# Patient Record
Sex: FEMALE | Race: WHITE
[De-identification: names, ages, dates, MRNs, and addresses within clinical notes are randomized per-mention and may not be internally consistent; named-entity substitution may affect disease eponyms.]

---

## 2017-04-04 ENCOUNTER — HOSPITAL ENCOUNTER (OUTPATIENT)
Dept: HOSPITAL 8 - CFH | Age: 52
Discharge: HOME | End: 2017-04-04
Attending: RADIOLOGY
Payer: COMMERCIAL

## 2017-04-04 DIAGNOSIS — C50.112: ICD-10-CM

## 2017-04-04 DIAGNOSIS — C79.31: Primary | ICD-10-CM

## 2017-04-04 DIAGNOSIS — C79.51: ICD-10-CM

## 2017-04-04 PROCEDURE — 70553 MRI BRAIN STEM W/O & W/DYE: CPT

## 2017-04-04 PROCEDURE — A9585 GADOBUTROL INJECTION: HCPCS

## 2017-04-12 ENCOUNTER — HOSPITAL ENCOUNTER (OUTPATIENT)
Dept: HOSPITAL 8 - ROC | Age: 52
Discharge: HOME | End: 2017-04-12
Attending: RADIOLOGY
Payer: COMMERCIAL

## 2017-04-12 DIAGNOSIS — C50.912: ICD-10-CM

## 2017-04-12 DIAGNOSIS — C79.31: Primary | ICD-10-CM

## 2017-04-12 PROCEDURE — G0463 HOSPITAL OUTPT CLINIC VISIT: HCPCS

## 2017-04-12 PROCEDURE — 99213 OFFICE O/P EST LOW 20 MIN: CPT

## 2017-05-16 ENCOUNTER — HOSPITAL ENCOUNTER (OUTPATIENT)
Dept: RADIOLOGY | Facility: MEDICAL CENTER | Age: 52
End: 2017-05-16
Attending: INTERNAL MEDICINE
Payer: COMMERCIAL

## 2017-05-16 DIAGNOSIS — C50.412 MALIGNANT NEOPLASM OF UPPER-OUTER QUADRANT OF LEFT FEMALE BREAST (HCC): ICD-10-CM

## 2017-05-16 PROCEDURE — A9503 TC99M MEDRONATE: HCPCS

## 2017-05-16 PROCEDURE — 700117 HCHG RX CONTRAST REV CODE 255: Performed by: INTERNAL MEDICINE

## 2017-05-16 PROCEDURE — 71260 CT THORAX DX C+: CPT

## 2017-05-16 RX ADMIN — IOHEXOL 100 ML: 350 INJECTION, SOLUTION INTRAVENOUS at 11:22

## 2017-05-23 ENCOUNTER — HOSPITAL ENCOUNTER (OUTPATIENT)
Dept: RADIOLOGY | Facility: MEDICAL CENTER | Age: 52
End: 2017-05-23
Attending: INTERNAL MEDICINE
Payer: COMMERCIAL

## 2017-05-23 ENCOUNTER — HOSPITAL ENCOUNTER (OUTPATIENT)
Dept: RADIOLOGY | Facility: MEDICAL CENTER | Age: 52
End: 2017-05-23

## 2017-05-23 DIAGNOSIS — C50.412 MALIGNANT NEOPLASM OF UPPER-OUTER QUADRANT OF LEFT FEMALE BREAST (HCC): ICD-10-CM

## 2017-05-23 PROCEDURE — 77080 DXA BONE DENSITY AXIAL: CPT

## 2017-06-01 ENCOUNTER — HOSPITAL ENCOUNTER (OUTPATIENT)
Dept: HOSPITAL 8 - CVU | Age: 52
Discharge: HOME | End: 2017-06-01
Attending: INTERNAL MEDICINE
Payer: COMMERCIAL

## 2017-06-01 DIAGNOSIS — E78.5: ICD-10-CM

## 2017-06-01 DIAGNOSIS — I34.0: Primary | ICD-10-CM

## 2017-06-01 DIAGNOSIS — R55: ICD-10-CM

## 2017-06-01 DIAGNOSIS — Z82.49: ICD-10-CM

## 2017-06-01 DIAGNOSIS — Z85.3: ICD-10-CM

## 2017-06-01 PROCEDURE — 93306 TTE W/DOPPLER COMPLETE: CPT

## 2017-06-21 ENCOUNTER — HOSPITAL ENCOUNTER (OUTPATIENT)
Dept: HOSPITAL 8 - ROC | Age: 52
Discharge: HOME | End: 2017-06-21
Attending: RADIOLOGY
Payer: COMMERCIAL

## 2017-06-21 DIAGNOSIS — C79.31: ICD-10-CM

## 2017-06-21 DIAGNOSIS — C50.112: ICD-10-CM

## 2017-06-21 DIAGNOSIS — Z08: Primary | ICD-10-CM

## 2017-06-21 PROCEDURE — G0463 HOSPITAL OUTPT CLINIC VISIT: HCPCS

## 2017-06-21 PROCEDURE — 99213 OFFICE O/P EST LOW 20 MIN: CPT

## 2017-08-02 ENCOUNTER — HOSPITAL ENCOUNTER (OUTPATIENT)
Dept: HOSPITAL 8 - ROC | Age: 52
Discharge: HOME | End: 2017-08-02
Attending: RADIOLOGY
Payer: COMMERCIAL

## 2017-08-02 DIAGNOSIS — Z79.811: ICD-10-CM

## 2017-08-02 DIAGNOSIS — Z92.3: ICD-10-CM

## 2017-08-02 DIAGNOSIS — Z90.12: ICD-10-CM

## 2017-08-02 DIAGNOSIS — C50.912: Primary | ICD-10-CM

## 2017-08-02 DIAGNOSIS — C79.31: ICD-10-CM

## 2017-08-02 PROCEDURE — 99213 OFFICE O/P EST LOW 20 MIN: CPT

## 2017-08-02 PROCEDURE — G0463 HOSPITAL OUTPT CLINIC VISIT: HCPCS

## 2017-08-21 ENCOUNTER — HOSPITAL ENCOUNTER (OUTPATIENT)
Dept: HOSPITAL 8 - CFH | Age: 52
Discharge: HOME | End: 2017-08-21
Attending: SURGERY
Payer: COMMERCIAL

## 2017-08-21 DIAGNOSIS — Z92.3: ICD-10-CM

## 2017-08-21 DIAGNOSIS — Z85.3: Primary | ICD-10-CM

## 2017-08-21 DIAGNOSIS — Z90.12: ICD-10-CM

## 2017-08-21 PROCEDURE — G0204 DX MAMMO INCL CAD BI: HCPCS

## 2017-10-02 ENCOUNTER — HOSPITAL ENCOUNTER (OUTPATIENT)
Dept: RADIOLOGY | Facility: MEDICAL CENTER | Age: 52
End: 2017-10-02
Attending: RADIOLOGY
Payer: COMMERCIAL

## 2017-10-02 DIAGNOSIS — C79.31 SECONDARY MALIGNANT NEOPLASM OF BRAIN AND SPINAL CORD (HCC): ICD-10-CM

## 2017-10-02 DIAGNOSIS — C79.49 SECONDARY MALIGNANT NEOPLASM OF BRAIN AND SPINAL CORD (HCC): ICD-10-CM

## 2017-10-02 PROCEDURE — 700117 HCHG RX CONTRAST REV CODE 255: Performed by: RADIOLOGY

## 2017-10-02 PROCEDURE — 70553 MRI BRAIN STEM W/O & W/DYE: CPT

## 2017-10-02 PROCEDURE — A9579 GAD-BASE MR CONTRAST NOS,1ML: HCPCS | Performed by: RADIOLOGY

## 2017-10-02 RX ADMIN — GADODIAMIDE 15 ML: 287 INJECTION INTRAVENOUS at 09:53

## 2017-10-04 ENCOUNTER — HOSPITAL ENCOUNTER (OUTPATIENT)
Dept: HOSPITAL 8 - ROC | Age: 52
Discharge: HOME | End: 2017-10-04
Attending: RADIOLOGY
Payer: COMMERCIAL

## 2017-10-04 DIAGNOSIS — D49.6: ICD-10-CM

## 2017-10-04 DIAGNOSIS — C50.912: Primary | ICD-10-CM

## 2017-10-04 DIAGNOSIS — Z90.12: ICD-10-CM

## 2017-10-04 DIAGNOSIS — Z92.3: ICD-10-CM

## 2017-10-04 PROCEDURE — 99213 OFFICE O/P EST LOW 20 MIN: CPT

## 2017-10-04 PROCEDURE — G0463 HOSPITAL OUTPT CLINIC VISIT: HCPCS

## 2017-11-01 ENCOUNTER — HOSPITAL ENCOUNTER (OUTPATIENT)
Dept: LAB | Facility: MEDICAL CENTER | Age: 52
End: 2017-11-01
Attending: NURSE PRACTITIONER
Payer: COMMERCIAL

## 2017-11-01 LAB — CYTOLOGY REG CYTOL: NORMAL

## 2017-11-01 PROCEDURE — 88112 CYTOPATH CELL ENHANCE TECH: CPT

## 2018-01-02 ENCOUNTER — HOSPITAL ENCOUNTER (OUTPATIENT)
Dept: RADIOLOGY | Facility: MEDICAL CENTER | Age: 53
End: 2018-01-02
Attending: RADIOLOGY
Payer: COMMERCIAL

## 2018-01-02 DIAGNOSIS — C79.31 SECONDARY MALIGNANT NEOPLASM OF BRAIN AND SPINAL CORD (HCC): ICD-10-CM

## 2018-01-02 DIAGNOSIS — C79.49 SECONDARY MALIGNANT NEOPLASM OF BRAIN AND SPINAL CORD (HCC): ICD-10-CM

## 2018-01-02 PROCEDURE — 700117 HCHG RX CONTRAST REV CODE 255: Performed by: RADIOLOGY

## 2018-01-02 PROCEDURE — 70553 MRI BRAIN STEM W/O & W/DYE: CPT

## 2018-01-02 PROCEDURE — A9579 GAD-BASE MR CONTRAST NOS,1ML: HCPCS | Performed by: RADIOLOGY

## 2018-01-02 RX ADMIN — GADODIAMIDE 15 ML: 287 INJECTION INTRAVENOUS at 10:54

## 2018-01-04 ENCOUNTER — HOSPITAL ENCOUNTER (OUTPATIENT)
Dept: HOSPITAL 8 - ROC | Age: 53
Discharge: HOME | End: 2018-01-04
Attending: RADIOLOGY
Payer: COMMERCIAL

## 2018-01-04 DIAGNOSIS — C50.912: ICD-10-CM

## 2018-01-04 DIAGNOSIS — C79.31: Primary | ICD-10-CM

## 2018-01-04 PROCEDURE — G0463 HOSPITAL OUTPT CLINIC VISIT: HCPCS

## 2018-01-04 PROCEDURE — 99213 OFFICE O/P EST LOW 20 MIN: CPT

## 2018-02-15 ENCOUNTER — HOSPITAL ENCOUNTER (OUTPATIENT)
Dept: RADIOLOGY | Facility: MEDICAL CENTER | Age: 53
End: 2018-02-15

## 2018-02-21 ENCOUNTER — TELEPHONE (OUTPATIENT)
Dept: RADIOLOGY | Facility: MEDICAL CENTER | Age: 53
End: 2018-02-21

## 2018-02-22 ENCOUNTER — HOSPITAL ENCOUNTER (OUTPATIENT)
Dept: RADIOLOGY | Facility: MEDICAL CENTER | Age: 53
End: 2018-02-22
Attending: SURGERY
Payer: COMMERCIAL

## 2018-02-22 DIAGNOSIS — Z85.3 PERSONAL HISTORY OF MALIGNANT NEOPLASM OF BREAST: ICD-10-CM

## 2018-02-22 PROCEDURE — G0279 TOMOSYNTHESIS, MAMMO: HCPCS | Mod: LT

## 2018-03-01 ENCOUNTER — HOSPITAL ENCOUNTER (OUTPATIENT)
Dept: LAB | Facility: MEDICAL CENTER | Age: 53
End: 2018-03-01
Attending: INTERNAL MEDICINE
Payer: COMMERCIAL

## 2018-03-01 LAB
25(OH)D3 SERPL-MCNC: 38 NG/ML (ref 30–100)
ALBUMIN SERPL BCP-MCNC: 4.2 G/DL (ref 3.2–4.9)
ALBUMIN/GLOB SERPL: 1.5 G/DL
ALP SERPL-CCNC: 104 U/L (ref 30–99)
ALT SERPL-CCNC: 32 U/L (ref 2–50)
ANION GAP SERPL CALC-SCNC: 7 MMOL/L (ref 0–11.9)
AST SERPL-CCNC: 26 U/L (ref 12–45)
BILIRUB SERPL-MCNC: 0.5 MG/DL (ref 0.1–1.5)
BUN SERPL-MCNC: 14 MG/DL (ref 8–22)
CALCIUM SERPL-MCNC: 9.4 MG/DL (ref 8.5–10.5)
CHLORIDE SERPL-SCNC: 105 MMOL/L (ref 96–112)
CHOLEST SERPL-MCNC: 156 MG/DL (ref 100–199)
CO2 SERPL-SCNC: 27 MMOL/L (ref 20–33)
CREAT SERPL-MCNC: 0.71 MG/DL (ref 0.5–1.4)
ESTRADIOL SERPL-MCNC: 26 PG/ML
FSH SERPL-ACNC: 130.9 MIU/ML
GLOBULIN SER CALC-MCNC: 2.8 G/DL (ref 1.9–3.5)
GLUCOSE SERPL-MCNC: 93 MG/DL (ref 65–99)
HDLC SERPL-MCNC: 48 MG/DL
LDLC SERPL CALC-MCNC: 95 MG/DL
POTASSIUM SERPL-SCNC: 3.8 MMOL/L (ref 3.6–5.5)
PROT SERPL-MCNC: 7 G/DL (ref 6–8.2)
SODIUM SERPL-SCNC: 139 MMOL/L (ref 135–145)
TRIGL SERPL-MCNC: 66 MG/DL (ref 0–149)

## 2018-03-01 PROCEDURE — 36415 COLL VENOUS BLD VENIPUNCTURE: CPT

## 2018-03-01 PROCEDURE — 82306 VITAMIN D 25 HYDROXY: CPT

## 2018-03-01 PROCEDURE — 83001 ASSAY OF GONADOTROPIN (FSH): CPT

## 2018-03-01 PROCEDURE — 82670 ASSAY OF TOTAL ESTRADIOL: CPT

## 2018-03-01 PROCEDURE — 80061 LIPID PANEL: CPT

## 2018-03-01 PROCEDURE — 80053 COMPREHEN METABOLIC PANEL: CPT

## 2018-03-16 ENCOUNTER — HOSPITAL ENCOUNTER (OUTPATIENT)
Dept: RADIOLOGY | Facility: MEDICAL CENTER | Age: 53
End: 2018-03-16
Attending: RADIOLOGY
Payer: COMMERCIAL

## 2018-03-16 DIAGNOSIS — C79.31 SECONDARY MALIGNANT NEOPLASM OF BRAIN AND SPINAL CORD (HCC): ICD-10-CM

## 2018-03-16 DIAGNOSIS — C79.49 SECONDARY MALIGNANT NEOPLASM OF BRAIN AND SPINAL CORD (HCC): ICD-10-CM

## 2018-03-16 PROCEDURE — 700117 HCHG RX CONTRAST REV CODE 255: Performed by: RADIOLOGY

## 2018-03-16 PROCEDURE — A9579 GAD-BASE MR CONTRAST NOS,1ML: HCPCS | Performed by: RADIOLOGY

## 2018-03-16 PROCEDURE — 70553 MRI BRAIN STEM W/O & W/DYE: CPT

## 2018-03-16 RX ADMIN — GADODIAMIDE 15 ML: 287 INJECTION INTRAVENOUS at 16:02

## 2018-03-22 ENCOUNTER — HOSPITAL ENCOUNTER (OUTPATIENT)
Dept: HOSPITAL 8 - ROC | Age: 53
Discharge: HOME | End: 2018-03-22
Attending: RADIOLOGY
Payer: COMMERCIAL

## 2018-03-22 DIAGNOSIS — Z08: Primary | ICD-10-CM

## 2018-03-22 DIAGNOSIS — Z90.12: ICD-10-CM

## 2018-03-22 DIAGNOSIS — Z85.3: ICD-10-CM

## 2018-03-22 DIAGNOSIS — C79.31: ICD-10-CM

## 2018-03-22 PROCEDURE — G0463 HOSPITAL OUTPT CLINIC VISIT: HCPCS

## 2018-03-22 PROCEDURE — 99213 OFFICE O/P EST LOW 20 MIN: CPT

## 2018-05-14 ENCOUNTER — HOSPITAL ENCOUNTER (OUTPATIENT)
Dept: RADIOLOGY | Facility: MEDICAL CENTER | Age: 53
End: 2018-05-14
Attending: RADIOLOGY
Payer: COMMERCIAL

## 2018-05-14 DIAGNOSIS — C79.49 SECONDARY MALIGNANT NEOPLASM OF BRAIN AND SPINAL CORD (HCC): ICD-10-CM

## 2018-05-14 DIAGNOSIS — C79.31 SECONDARY MALIGNANT NEOPLASM OF BRAIN AND SPINAL CORD (HCC): ICD-10-CM

## 2018-05-14 PROCEDURE — A9579 GAD-BASE MR CONTRAST NOS,1ML: HCPCS | Performed by: RADIOLOGY

## 2018-05-14 PROCEDURE — 700117 HCHG RX CONTRAST REV CODE 255: Performed by: RADIOLOGY

## 2018-05-14 PROCEDURE — 70553 MRI BRAIN STEM W/O & W/DYE: CPT

## 2018-05-14 RX ADMIN — GADODIAMIDE 15 ML: 287 INJECTION INTRAVENOUS at 10:46

## 2018-05-23 ENCOUNTER — HOSPITAL ENCOUNTER (OUTPATIENT)
Dept: HOSPITAL 8 - ROC | Age: 53
Discharge: HOME | End: 2018-05-23
Attending: RADIOLOGY
Payer: COMMERCIAL

## 2018-05-23 DIAGNOSIS — C50.112: ICD-10-CM

## 2018-05-23 DIAGNOSIS — C79.31: Primary | ICD-10-CM

## 2018-05-23 DIAGNOSIS — Z79.811: ICD-10-CM

## 2018-05-23 PROCEDURE — 99213 OFFICE O/P EST LOW 20 MIN: CPT

## 2018-05-23 PROCEDURE — G0463 HOSPITAL OUTPT CLINIC VISIT: HCPCS

## 2018-06-01 ENCOUNTER — HOSPITAL ENCOUNTER (OUTPATIENT)
Dept: RADIOLOGY | Facility: MEDICAL CENTER | Age: 53
End: 2018-06-01
Attending: RADIOLOGY
Payer: COMMERCIAL

## 2018-06-01 DIAGNOSIS — C79.49 SECONDARY MALIGNANT NEOPLASM OF BRAIN AND SPINAL CORD (HCC): ICD-10-CM

## 2018-06-01 DIAGNOSIS — C79.31 SECONDARY MALIGNANT NEOPLASM OF BRAIN AND SPINAL CORD (HCC): ICD-10-CM

## 2018-06-01 PROCEDURE — A9552 F18 FDG: HCPCS

## 2018-06-07 ENCOUNTER — HOSPITAL ENCOUNTER (OUTPATIENT)
Dept: RADIOLOGY | Facility: MEDICAL CENTER | Age: 53
End: 2018-06-07
Attending: RADIOLOGY
Payer: COMMERCIAL

## 2018-06-07 DIAGNOSIS — C50.112 MALIGNANT NEOPLASM OF CENTRAL PORTION OF LEFT FEMALE BREAST, UNSPECIFIED ESTROGEN RECEPTOR STATUS (HCC): ICD-10-CM

## 2018-06-07 DIAGNOSIS — C77.0 METASTASIS TO HEAD AND NECK LYMPH NODE (HCC): ICD-10-CM

## 2018-06-07 PROCEDURE — 88112 CYTOPATH CELL ENHANCE TECH: CPT

## 2018-06-07 PROCEDURE — 10022 IR-NEEDLE BX-LYMPH NODE: CPT

## 2018-06-07 ASSESSMENT — PAIN SCALES - GENERAL: PAINLEVEL_OUTOF10: 0

## 2018-06-07 NOTE — PROGRESS NOTES
"Patient given [facility] \"Preventing the Spread of Infection\" brochure upon arrival.     US guided fine needle aspiration of  LEFT Neck Lymph Node done by Dr. Georges; Left anterior aspect of neck access site; 1 jar of cytolyt obtained and sent to pathology lab; pt tolerated the procedure well; pt hemodynamically stable pre/intra/post procedure; all questions and concerns answered prior to being d/c; patient provided with appropriate education for procedure; pt d/c home.  "

## 2018-06-26 ENCOUNTER — HOSPITAL ENCOUNTER (OUTPATIENT)
Facility: MEDICAL CENTER | Age: 53
End: 2018-06-26
Attending: SURGERY | Admitting: SURGERY
Payer: COMMERCIAL

## 2018-06-26 VITALS
HEIGHT: 65 IN | DIASTOLIC BLOOD PRESSURE: 77 MMHG | BODY MASS INDEX: 23.65 KG/M2 | OXYGEN SATURATION: 100 % | RESPIRATION RATE: 14 BRPM | TEMPERATURE: 97.3 F | SYSTOLIC BLOOD PRESSURE: 138 MMHG | HEART RATE: 57 BPM | WEIGHT: 141.98 LBS

## 2018-06-26 PROCEDURE — 88305 TISSUE EXAM BY PATHOLOGIST: CPT

## 2018-06-26 PROCEDURE — A6402 STERILE GAUZE <= 16 SQ IN: HCPCS | Performed by: SURGERY

## 2018-06-26 PROCEDURE — 700111 HCHG RX REV CODE 636 W/ 250 OVERRIDE (IP)

## 2018-06-26 PROCEDURE — 88360 TUMOR IMMUNOHISTOCHEM/MANUAL: CPT

## 2018-06-26 PROCEDURE — 501838 HCHG SUTURE GENERAL: Performed by: SURGERY

## 2018-06-26 PROCEDURE — 88342 IMHCHEM/IMCYTCHM 1ST ANTB: CPT

## 2018-06-26 PROCEDURE — 160048 HCHG OR STATISTICAL LEVEL 1-5: Performed by: SURGERY

## 2018-06-26 PROCEDURE — 700101 HCHG RX REV CODE 250

## 2018-06-26 PROCEDURE — 160046 HCHG PACU - 1ST 60 MINS PHASE II: Performed by: SURGERY

## 2018-06-26 PROCEDURE — 160009 HCHG ANES TIME/MIN: Performed by: SURGERY

## 2018-06-26 PROCEDURE — 160035 HCHG PACU - 1ST 60 MINS PHASE I: Performed by: SURGERY

## 2018-06-26 PROCEDURE — 160029 HCHG SURGERY MINUTES - 1ST 30 MINS LEVEL 4: Performed by: SURGERY

## 2018-06-26 PROCEDURE — 160036 HCHG PACU - EA ADDL 30 MINS PHASE I: Performed by: SURGERY

## 2018-06-26 PROCEDURE — 160002 HCHG RECOVERY MINUTES (STAT): Performed by: SURGERY

## 2018-06-26 PROCEDURE — 160025 RECOVERY II MINUTES (STATS): Performed by: SURGERY

## 2018-06-26 PROCEDURE — 88341 IMHCHEM/IMCYTCHM EA ADD ANTB: CPT

## 2018-06-26 RX ORDER — HYDROCODONE BITARTRATE AND ACETAMINOPHEN 5; 325 MG/1; MG/1
1-2 TABLET ORAL EVERY 4 HOURS PRN
Status: DISCONTINUED | OUTPATIENT
Start: 2018-06-26 | End: 2018-06-26 | Stop reason: HOSPADM

## 2018-06-26 RX ORDER — SODIUM CHLORIDE AND POTASSIUM CHLORIDE 150; 900 MG/100ML; MG/100ML
INJECTION, SOLUTION INTRAVENOUS CONTINUOUS
Status: DISCONTINUED | OUTPATIENT
Start: 2018-06-26 | End: 2018-06-26 | Stop reason: HOSPADM

## 2018-06-26 RX ORDER — LETROZOLE 2.5 MG/1
2.5 TABLET, FILM COATED ORAL EVERY EVENING
COMMUNITY
End: 2020-02-04

## 2018-06-26 RX ORDER — LIDOCAINE HYDROCHLORIDE 10 MG/ML
0.5 INJECTION, SOLUTION INFILTRATION; PERINEURAL
Status: DISCONTINUED | OUTPATIENT
Start: 2018-06-26 | End: 2018-06-26 | Stop reason: HOSPADM

## 2018-06-26 RX ORDER — BUPIVACAINE HYDROCHLORIDE 2.5 MG/ML
INJECTION, SOLUTION EPIDURAL; INFILTRATION; INTRACAUDAL
Status: DISCONTINUED | OUTPATIENT
Start: 2018-06-26 | End: 2018-06-26 | Stop reason: HOSPADM

## 2018-06-26 RX ORDER — ATORVASTATIN CALCIUM 80 MG/1
80 TABLET, FILM COATED ORAL DAILY
COMMUNITY
End: 2021-10-06

## 2018-06-26 RX ORDER — SODIUM CHLORIDE, SODIUM LACTATE, POTASSIUM CHLORIDE, CALCIUM CHLORIDE 600; 310; 30; 20 MG/100ML; MG/100ML; MG/100ML; MG/100ML
INJECTION, SOLUTION INTRAVENOUS CONTINUOUS
Status: DISCONTINUED | OUTPATIENT
Start: 2018-06-26 | End: 2018-06-26 | Stop reason: HOSPADM

## 2018-06-26 RX ADMIN — SODIUM CHLORIDE, SODIUM LACTATE, POTASSIUM CHLORIDE, CALCIUM CHLORIDE: 600; 310; 30; 20 INJECTION, SOLUTION INTRAVENOUS at 10:14

## 2018-06-26 ASSESSMENT — PAIN SCALES - GENERAL
PAINLEVEL_OUTOF10: 0
PAINLEVEL_OUTOF10: 3
PAINLEVEL_OUTOF10: 0

## 2018-06-26 NOTE — OP REPORT
DATE OF SERVICE:  06/26/2018    PREOPERATIVE DIAGNOSIS:  Left cervical lymphadenopathy with history of breast   cancer.    POSTOPERATIVE DIAGNOSIS:  Left cervical lymphadenopathy with history of breast   cancer.    PROCEDURE:  Left cervical lymph node biopsy.    SURGEON:  Nataliia Acevedo MD    ANESTHESIA:  General endotracheal anesthesia.    ANESTHESIOLOGIST:  Toro Ragland MD    INDICATIONS:  The patient is a 52-year-old female, who presented in 2016 with   a brain metastasis.  She subsequently had radiation.  She also was found to   have breast cancer in the left breast.  She had a partial mastectomy and   sentinel node biopsy done at that time.  The tumor was found to be ER/MO   positive.  She subsequently has been on letrozole; however, she now has   developed a lymph node in her neck and she is being brought at this time for   excision of that.    FINDINGS:  Her 2 cm lymph node on the left cervical anterior chain in the   submandibular area.    DESCRIPTION OF PROCEDURE:  After patient was identified and consented, she was   brought to the operating room and placed in supine position.  Patient   underwent laryngeal mask anesthetic clearance.  Patient's left neck was   prepped and draped in sterile fashion.  The transverse incision was made over   the mass using electrocautery.  Subcutaneous tissue was dissected down to the   subcutaneous tissue and platysma through lymph node.  It was  from   surrounding tissues using electrocautery as well as Hemoclips and sent to   pathology for evaluation.  The cavity was irrigated and hemostasis secured.    Cavity was closed with 3-0 Vicryl subcutaneous layer and skin was closed with   running 4-0 in a fashion.  Anesthetized with 0.25% Marcaine.  Op-Site dressing   placed on the wound.  Patient was extubated and taken to recovery room in   stable condition.  All sponge and needle counts were correct.       ____________________________________     NATALIIA ACEVEDO,  MD [facility]    DD:  06/26/2018 11:43:17  DT:  06/26/2018 11:53:35    D#:  [id number]  Job#:  [id number]    cc: IVIS SMYTH MD, DELLA BIRD MD

## 2018-06-26 NOTE — OR NURSING
Discharge information reviewed with patient and responsible adult. No questions or concerns at this time.     IV discontinued.     See vital sign flowsheets  for discharge details.

## 2018-06-26 NOTE — DISCHARGE INSTRUCTIONS
ACTIVITY: Rest and take it easy for the first 24 hours.  A responsible adult is recommended to remain with you during that time.  It is normal to feel sleepy.  We encourage you to not do anything that requires balance, judgment or coordination.    MILD FLU-LIKE SYMPTOMS ARE NORMAL. YOU MAY EXPERIENCE GENERALIZED MUSCLE ACHES, THROAT IRRITATION, HEADACHE AND/OR SOME NAUSEA.    FOR 24 HOURS DO NOT:  Drive, operate machinery or run household appliances.  Drink beer or alcoholic beverages.   Make important decisions or sign legal documents.    SPECIAL INSTRUCTIONS:     D/C instructions:    DIET: Upon discharge from the hospital you may resume your normal preoperative diet. Depending on how you are feeling and whether you have nausea or not, you may wish to stay with a bland diet for the first few days. However, you can advance this as quickly as you feel ready.    ACTIVITIES: After discharge from the hospital, you may resume full routine activities. However, there should be no heavy lifting (greater than 15 pounds) and no strenuous activities until after your follow-up visit. Otherwise, routine activities of daily living are acceptable.    DRIVING: If you drive, you may drive whenever you are off pain medications and are able to perform the activities needed to drive, i.e. turning, bending, twisting, etc.    BATHING: You may get the wound wet at any time after leaving the hospital. You may shower, but do not submerge in a bath for at least a week. Dressings may come off after 48 hours.    PAIN MEDICATION: You will be given a prescription for pain medication at discharge. Please take these as directed. It is important to remember not to take medications on an empty stomach as this may cause nausea.    CALL IF YOU HAVE: (1) Fevers to more than 1010 F, (2) Unusual chest or leg pain, (3) Drainage or fluid from incision that may be foul smelling, increased tenderness or soreness at the wound or the wound edges are no  longer together, redness or swelling at the incision site. Please do not hesitate to call with any other questions.     APPOINTMENT: Contact our office at [phone] for a follow-up appointment in 1 week following your procedure.    If you have any additional questions, please do not hesitate to call the office.     Office address:   [address], [address] [place] [address]  Biopsy  Care After  These instructions give you information on caring for yourself after your procedure. Your doctor may also give you more specific instructions. Call your doctor if you have any problems or questions after your procedure.  HOME CARE   · Return to your normal diet and activities as told by your doctor.  · Change your bandages (dressings) as told by your doctor. If skin glue (adhesive) was used, it will peel off in 7 days.  · Only take medicines as told by your doctor.  · Ask your doctor when you can bathe and get your wound wet.  GET HELP RIGHT AWAY IF:  · You see more than a small spot of blood coming from the wound.  · You have redness, puffiness (swelling), or pain.  · You see yellowish-white fluid (pus) coming from the wound.  · You have a fever.  · You notice a bad smell coming from the wound or bandage.  · You have a rash, trouble breathing, or any allergy problems.  MAKE SURE YOU:   · Understand these instructions.  · Will watch your condition.  · Will get help right away if you are not doing well or get worse.        DIET: To avoid nausea, slowly advance diet as tolerated, avoiding spicy or greasy foods for the first day.  Add more substantial food to your diet according to your physician's instructions.  Babies can be fed formula or breast milk as soon as they are hungry.  INCREASE FLUIDS AND FIBER TO AVOID CONSTIPATION.    SURGICAL DRESSING/BATHING: Keep dressing clean and dry    FOLLOW-UP APPOINTMENT:  A follow-up appointment should be arranged with your doctor in 1-2 weeks; call to schedule. Follow Up 6/24/18 at  4:00pm    You should CALL YOUR PHYSICIAN if you develop:  Fever greater than 101 degrees F.  Pain not relieved by medication, or persistent nausea or vomiting.  Excessive bleeding (blood soaking through dressing) or unexpected drainage from the wound.  Extreme redness or swelling around the incision site, drainage of pus or foul smelling drainage.  Inability to urinate or empty your bladder within 8 hours.  Problems with breathing or chest pain.    You should call 911 if you develop problems with breathing or chest pain.  If you are unable to contact your doctor or surgical center, you should go to the nearest emergency room or urgent care center.  Physician's telephone #: [phone]    If any questions arise, call your doctor.  If your doctor is not available, please feel free to call the Surgical Center at [phone].  The Center is open Monday through Friday from 7AM to 7PM.  You can also call the [employer] HOTLINE open 24 hours/day, 7 days/week and speak to a nurse at [phone], or toll free at [phone].    A registered nurse may call you a few days after your surgery to see how you are doing after your procedure.    MEDICATIONS: Resume taking daily medication.  Take prescribed pain medication with food.  If no medication is prescribed, you may take non-aspirin pain medication if needed.  PAIN MEDICATION CAN BE VERY CONSTIPATING.  Take a stool softener or laxative such as senokot, pericolace, or milk of magnesia if needed.    Prescription given for Norco.  Pain medication may be taken any time.    If your physician has prescribed pain medication that includes Acetaminophen (Tylenol), do not take additional Acetaminophen (Tylenol) while taking the prescribed medication.    Depression / Suicide Risk    As you are discharged from this [facility] facility, it is important to learn how to keep safe from harming yourself.    Recognize the warning signs:  · Abrupt changes in personality, positive or  negative- including increase in energy   · Giving away possessions  · Change in eating patterns- significant weight changes-  positive or negative  · Change in sleeping patterns- unable to sleep or sleeping all the time   · Unwillingness or inability to communicate  · Depression  · Unusual sadness, discouragement and loneliness  · Talk of wanting to die  · Neglect of personal appearance   · Rebelliousness- reckless behavior  · Withdrawal from people/activities they love  · Confusion- inability to concentrate     If you or a loved one observes any of these behaviors or has concerns about self-harm, here's what you can do:  · Talk about it- your feelings and reasons for harming yourself  · Remove any means that you might use to hurt yourself (examples: pills, rope, extension cords, firearm)  · Get professional help from the community (Mental Health, Substance Abuse, psychological counseling)  · Do not be alone:Call your Safe Contact- someone whom you trust who will be there for you.  · Call your local CRISIS HOTLINE 741-8023 or 235-292-0138  · Call your local Children's Mobile Crisis Response Team [place] (551) 987-6790 or www.FamilyLeaf  · Call the toll free National Suicide Prevention Hotlines   · National Suicide Prevention Lifeline 361-159-PFTW (3657)  · National Hope Line Network 800-SUICIDE (032-2149)

## 2018-06-26 NOTE — OR NURSING
Patient A+Ox4. States pain 3/10. Declines meds. Dressing to left neck clean and dry.  Plan for patient to discharge home.  Taken to bathroom and able to amb with steady gait.

## 2018-06-26 NOTE — OR NURSING
"Assumed care of patient at approx 1305.  Patient alert and oriented x 4. See flowsheets for VS.  Pain is rated 3/10 \"tolerable.\"    Call light and personal belongings within reach. Gurney in lowest position. Monitor alarms set appropriately.    Dressing is CDI   . See flowsheets for detailed wound documentation.           "

## 2018-07-02 ENCOUNTER — HOSPITAL ENCOUNTER (OUTPATIENT)
Dept: LAB | Facility: MEDICAL CENTER | Age: 53
End: 2018-07-02
Attending: INTERNAL MEDICINE
Payer: COMMERCIAL

## 2018-07-02 PROCEDURE — 82306 VITAMIN D 25 HYDROXY: CPT

## 2018-07-02 PROCEDURE — 86300 IMMUNOASSAY TUMOR CA 15-3: CPT

## 2018-07-02 PROCEDURE — 82670 ASSAY OF TOTAL ESTRADIOL: CPT

## 2018-07-02 PROCEDURE — 83001 ASSAY OF GONADOTROPIN (FSH): CPT

## 2018-07-02 PROCEDURE — 80053 COMPREHEN METABOLIC PANEL: CPT

## 2018-07-03 LAB
25(OH)D3 SERPL-MCNC: 42 NG/ML (ref 30–100)
ALBUMIN SERPL BCP-MCNC: 4.4 G/DL (ref 3.2–4.9)
ALBUMIN/GLOB SERPL: 1.6 G/DL
ALP SERPL-CCNC: 114 U/L (ref 30–99)
ALT SERPL-CCNC: 20 U/L (ref 2–50)
ANION GAP SERPL CALC-SCNC: 8 MMOL/L (ref 0–11.9)
AST SERPL-CCNC: 22 U/L (ref 12–45)
BILIRUB SERPL-MCNC: 0.5 MG/DL (ref 0.1–1.5)
BUN SERPL-MCNC: 19 MG/DL (ref 8–22)
CALCIUM SERPL-MCNC: 9.7 MG/DL (ref 8.5–10.5)
CHLORIDE SERPL-SCNC: 104 MMOL/L (ref 96–112)
CO2 SERPL-SCNC: 27 MMOL/L (ref 20–33)
CREAT SERPL-MCNC: 0.76 MG/DL (ref 0.5–1.4)
ESTRADIOL SERPL-MCNC: <20 PG/ML
FSH SERPL-ACNC: 143 MIU/ML
GLOBULIN SER CALC-MCNC: 2.8 G/DL (ref 1.9–3.5)
GLUCOSE SERPL-MCNC: 76 MG/DL (ref 65–99)
POTASSIUM SERPL-SCNC: 4.1 MMOL/L (ref 3.6–5.5)
PROT SERPL-MCNC: 7.2 G/DL (ref 6–8.2)
SODIUM SERPL-SCNC: 139 MMOL/L (ref 135–145)

## 2018-07-04 LAB — CANCER AG27-29 SERPL-ACNC: 21.1 U/ML (ref 0–40)

## 2018-07-09 ENCOUNTER — HOSPITAL ENCOUNTER (OUTPATIENT)
Dept: RADIOLOGY | Facility: MEDICAL CENTER | Age: 53
End: 2018-07-09
Attending: INTERNAL MEDICINE
Payer: COMMERCIAL

## 2018-07-09 DIAGNOSIS — C50.412 MALIGNANT NEOPLASM OF UPPER-OUTER QUADRANT OF LEFT FEMALE BREAST, UNSPECIFIED ESTROGEN RECEPTOR STATUS (HCC): ICD-10-CM

## 2018-07-09 PROCEDURE — 73552 X-RAY EXAM OF FEMUR 2/>: CPT | Mod: LT

## 2018-07-16 ENCOUNTER — HOSPITAL ENCOUNTER (OUTPATIENT)
Dept: RADIOLOGY | Facility: MEDICAL CENTER | Age: 53
End: 2018-07-16
Attending: RADIOLOGY
Payer: COMMERCIAL

## 2018-07-16 DIAGNOSIS — C79.49 SECONDARY MALIGNANT NEOPLASM OF BRAIN AND SPINAL CORD (HCC): ICD-10-CM

## 2018-07-16 DIAGNOSIS — C79.31 SECONDARY MALIGNANT NEOPLASM OF BRAIN AND SPINAL CORD (HCC): ICD-10-CM

## 2018-07-16 PROCEDURE — 70553 MRI BRAIN STEM W/O & W/DYE: CPT

## 2018-07-16 PROCEDURE — 700117 HCHG RX CONTRAST REV CODE 255: Performed by: RADIOLOGY

## 2018-07-16 PROCEDURE — A9585 GADOBUTROL INJECTION: HCPCS | Performed by: RADIOLOGY

## 2018-07-16 RX ORDER — GADOBUTROL 604.72 MG/ML
7.5 INJECTION INTRAVENOUS ONCE
Status: COMPLETED | OUTPATIENT
Start: 2018-07-16 | End: 2018-07-16

## 2018-07-16 RX ADMIN — GADOBUTROL 7.5 ML: 604.72 INJECTION INTRAVENOUS at 13:31

## 2018-07-24 ENCOUNTER — TELEPHONE (OUTPATIENT)
Dept: RADIOLOGY | Facility: MEDICAL CENTER | Age: 53
End: 2018-07-24

## 2018-07-26 ENCOUNTER — HOSPITAL ENCOUNTER (OUTPATIENT)
Dept: LAB | Facility: MEDICAL CENTER | Age: 53
End: 2018-07-26
Attending: INTERNAL MEDICINE
Payer: COMMERCIAL

## 2018-07-26 LAB
ALBUMIN SERPL BCP-MCNC: 4.4 G/DL (ref 3.2–4.9)
ALBUMIN/GLOB SERPL: 2 G/DL
ALP SERPL-CCNC: 116 U/L (ref 30–99)
ALT SERPL-CCNC: 19 U/L (ref 2–50)
ANION GAP SERPL CALC-SCNC: 7 MMOL/L (ref 0–11.9)
AST SERPL-CCNC: 17 U/L (ref 12–45)
BILIRUB SERPL-MCNC: 0.4 MG/DL (ref 0.1–1.5)
BUN SERPL-MCNC: 18 MG/DL (ref 8–22)
CALCIUM SERPL-MCNC: 8.9 MG/DL (ref 8.5–10.5)
CHLORIDE SERPL-SCNC: 108 MMOL/L (ref 96–112)
CO2 SERPL-SCNC: 26 MMOL/L (ref 20–33)
CREAT SERPL-MCNC: 0.97 MG/DL (ref 0.5–1.4)
GLOBULIN SER CALC-MCNC: 2.2 G/DL (ref 1.9–3.5)
GLUCOSE SERPL-MCNC: 49 MG/DL (ref 65–99)
POTASSIUM SERPL-SCNC: 4 MMOL/L (ref 3.6–5.5)
PROT SERPL-MCNC: 6.6 G/DL (ref 6–8.2)
SODIUM SERPL-SCNC: 141 MMOL/L (ref 135–145)

## 2018-07-26 PROCEDURE — 80053 COMPREHEN METABOLIC PANEL: CPT

## 2018-07-31 ENCOUNTER — HOSPITAL ENCOUNTER (OUTPATIENT)
Dept: RADIOLOGY | Facility: MEDICAL CENTER | Age: 53
End: 2018-07-31
Attending: SURGERY
Payer: COMMERCIAL

## 2018-07-31 DIAGNOSIS — Z85.3 PERSONAL HISTORY OF MALIGNANT NEOPLASM OF BREAST: ICD-10-CM

## 2018-07-31 PROCEDURE — G0279 TOMOSYNTHESIS, MAMMO: HCPCS

## 2018-08-01 ENCOUNTER — HOSPITAL ENCOUNTER (OUTPATIENT)
Facility: MEDICAL CENTER | Age: 53
End: 2018-08-01
Attending: DERMATOLOGY
Payer: COMMERCIAL

## 2018-08-01 ENCOUNTER — OFFICE VISIT (OUTPATIENT)
Dept: DERMATOLOGY | Facility: IMAGING CENTER | Age: 53
End: 2018-08-01
Payer: COMMERCIAL

## 2018-08-01 VITALS — BODY MASS INDEX: 23.32 KG/M2 | WEIGHT: 140 LBS | HEIGHT: 65 IN | TEMPERATURE: 98.8 F

## 2018-08-01 DIAGNOSIS — L81.4 LENTIGINES: ICD-10-CM

## 2018-08-01 DIAGNOSIS — D48.5 NEOPLASM OF UNCERTAIN BEHAVIOR OF SKIN: ICD-10-CM

## 2018-08-01 DIAGNOSIS — D22.9 MULTIPLE NEVI: ICD-10-CM

## 2018-08-01 DIAGNOSIS — L82.1 SEBORRHEIC KERATOSES: ICD-10-CM

## 2018-08-01 PROCEDURE — 11100 PR BIOPSY OF SKIN LESION: CPT | Performed by: DERMATOLOGY

## 2018-08-01 PROCEDURE — 99202 OFFICE O/P NEW SF 15 MIN: CPT | Mod: 25 | Performed by: DERMATOLOGY

## 2018-08-01 PROCEDURE — 88304 TISSUE EXAM BY PATHOLOGIST: CPT

## 2018-08-01 RX ORDER — ABEMACICLIB 100 MG/1
100 TABLET ORAL DAILY
COMMUNITY
Start: 2018-07-11 | End: 2021-10-06

## 2018-08-01 ASSESSMENT — ENCOUNTER SYMPTOMS
FEVER: 0
CHILLS: 0

## 2018-08-01 NOTE — PROGRESS NOTES
Dermatology New Patient Visit    Chief Complaint   Patient presents with   • Establish Care     COLETTE       Subjective:     HPI:   Michelle Diana is a 52 y.o. female presenting for    Full skin check  No specific spots of concern, but notes she has increasing number of freckles/brown spots, and she cannot monitor the back very well  Notes she has a few newer brown spots on the nose as well  Previous dermatologist was in [place].     History of skin cancer: No  History of precancers/actinic keratoses: Yes, Details: cryo, shave bx  History of biopsies:Yes, Details: was told Dx precancers  History of blistering/severe sunburns:Yes, Details: as achild  Family history of skin cancer:No  Family history of atypical moles:No  Has metastatic breast CA, on systemic treatment (PO)          Past Medical History:   Diagnosis Date   • Cancer (HCC)     left breast   • High cholesterol        Current Outpatient Prescriptions on File Prior to Visit   Medication Sig Dispense Refill   • atorvastatin (LIPITOR) 80 MG tablet Take 80 mg by mouth every day.     • CALCIUM PO Take 2,000 mg by mouth every day.     • letrozole (FEMARA) 2.5 MG Tab Take 2.5 mg by mouth every evening.     • aspirin EC (ECOTRIN) 81 MG Tablet Delayed Response Take 81 mg by mouth every day.       No current facility-administered medications on file prior to visit.        No Known Allergies    No family history on file.    Social History     Social History   • Marital status:      Spouse name: N/A   • Number of children: N/A   • Years of education: N/A     Occupational History   • Not on file.     Social History Main Topics   • Smoking status: Never Smoker   • Smokeless tobacco: Never Used   • Alcohol use No   • Drug use: No   • Sexual activity: Not on file     Other Topics Concern   • Not on file     Social History Narrative   • No narrative on file       Review of Systems   Constitutional: Negative for chills and fever.   Skin: Negative for itching  "and rash.   All other systems reviewed and are negative.       Objective:     A full mucocutaneous exam was completed including: scalp, hair, ears, face, eyelids, conjunctiva, lips, gums/tongue/oropharynx, neck, chest breasts, abdomen, back, left and right upper extremities (including hands/digits and fingernails), left and right lower extremities (including feet/toes, toenails), buttocks, including external genitalia with the following pertinent findings listed below. Remaining above-listed examined areas within normal limits / negative for rashes or lesions.    Temperature 37.1 °C (98.8 °F), height 1.651 m (5' 5\"), weight 63.5 kg (140 lb).    Physical Exam   Constitutional: She is oriented to person, place, and time and well-developed, well-nourished, and in no distress.   HENT:   Head: Normocephalic and atraumatic.       Right Ear: External ear normal.   Left Ear: External ear normal.   Nose: Nose normal.   Mouth/Throat: Oropharynx is clear and moist.   Eyes: Conjunctivae and lids are normal.   Neck: Normal range of motion. Neck supple.   Cardiovascular: Intact distal pulses.    Pulmonary/Chest: Effort normal.   Neurological: She is alert and oriented to person, place, and time.   Skin: Skin is warm and dry.        Psychiatric: Mood and affect normal.   Vitals reviewed.      DATA: none applicable to review    Assessment and Plan:     1. Neoplasm of uncertain behavior of skin  Procedure Note   Procedure: Biopsy by shave technique  Location: as noted above  Size: as noted in exam  Preoperative diagnosis:scar, r/o atypical pigmented lesion  Risks, benefits and alternatives of procedure discussed and written informed consent obtained. Time out completed. Area of biopsy prepped with alcohol. Anesthesia with 1% lidocaine with epinephrine administered with 30 gauge needle. Shave biopsy of the site performed. Hemostasis achieved with pressure and aluminum chloride. Vaseline applied to wound with bandage. Patient " tolerated procedure well and there were no complications. The specimen was sent to the pathology lab by the staff. Wound care was discussed.  - PATHOLOGY SPECIMEN; Future    2. Multiple nevi  - Benign-appearing nature of lesions discussed. Advised to return to clinic for any new or concerning changes.  - ABCDE's of melanoma discussed    3. Lentigines  - Benign-appearing nature of lesions discussed. Advised to return to clinic for any new or concerning changes.    4. Seborrheic keratoses  - Benign-appearing nature of lesions discussed. Advised to return to clinic for any new or concerning changes.      Followup: Return in about 1 year (around 8/1/2019) for chasity.    Christin Wesley M.D.

## 2018-08-30 ENCOUNTER — HOSPITAL ENCOUNTER (OUTPATIENT)
Dept: LAB | Facility: MEDICAL CENTER | Age: 53
End: 2018-08-30
Attending: INTERNAL MEDICINE
Payer: COMMERCIAL

## 2018-08-30 LAB
ALBUMIN SERPL BCP-MCNC: 4.2 G/DL (ref 3.2–4.9)
ALBUMIN/GLOB SERPL: 1.4 G/DL
ALP SERPL-CCNC: 81 U/L (ref 30–99)
ALT SERPL-CCNC: 24 U/L (ref 2–50)
ANION GAP SERPL CALC-SCNC: 5 MMOL/L (ref 0–11.9)
AST SERPL-CCNC: 19 U/L (ref 12–45)
BILIRUB SERPL-MCNC: 0.4 MG/DL (ref 0.1–1.5)
BUN SERPL-MCNC: 17 MG/DL (ref 8–22)
CALCIUM SERPL-MCNC: 8.9 MG/DL (ref 8.5–10.5)
CHLORIDE SERPL-SCNC: 106 MMOL/L (ref 96–112)
CHOLEST SERPL-MCNC: 152 MG/DL (ref 100–199)
CO2 SERPL-SCNC: 27 MMOL/L (ref 20–33)
CREAT SERPL-MCNC: 0.79 MG/DL (ref 0.5–1.4)
GLOBULIN SER CALC-MCNC: 3 G/DL (ref 1.9–3.5)
GLUCOSE SERPL-MCNC: 82 MG/DL (ref 65–99)
HDLC SERPL-MCNC: 59 MG/DL
LDLC SERPL CALC-MCNC: 78 MG/DL
POTASSIUM SERPL-SCNC: 4.3 MMOL/L (ref 3.6–5.5)
PROT SERPL-MCNC: 7.2 G/DL (ref 6–8.2)
SODIUM SERPL-SCNC: 138 MMOL/L (ref 135–145)
TRIGL SERPL-MCNC: 74 MG/DL (ref 0–149)

## 2018-08-30 PROCEDURE — 80061 LIPID PANEL: CPT

## 2018-08-30 PROCEDURE — 36415 COLL VENOUS BLD VENIPUNCTURE: CPT

## 2018-08-30 PROCEDURE — 80053 COMPREHEN METABOLIC PANEL: CPT

## 2018-09-05 ENCOUNTER — HOSPITAL ENCOUNTER (OUTPATIENT)
Dept: LAB | Facility: MEDICAL CENTER | Age: 53
End: 2018-09-05
Attending: INTERNAL MEDICINE
Payer: COMMERCIAL

## 2018-09-05 LAB
ANION GAP SERPL CALC-SCNC: 8 MMOL/L (ref 0–11.9)
BUN SERPL-MCNC: 20 MG/DL (ref 8–22)
CALCIUM SERPL-MCNC: 9.5 MG/DL (ref 8.5–10.5)
CHLORIDE SERPL-SCNC: 107 MMOL/L (ref 96–112)
CO2 SERPL-SCNC: 24 MMOL/L (ref 20–33)
CREAT SERPL-MCNC: 0.82 MG/DL (ref 0.5–1.4)
GLUCOSE SERPL-MCNC: 87 MG/DL (ref 65–99)
MAGNESIUM SERPL-MCNC: 2.1 MG/DL (ref 1.5–2.5)
PHOSPHATE SERPL-MCNC: 2.4 MG/DL (ref 2.5–4.5)
POTASSIUM SERPL-SCNC: 4 MMOL/L (ref 3.6–5.5)
SODIUM SERPL-SCNC: 139 MMOL/L (ref 135–145)

## 2018-09-05 PROCEDURE — 84100 ASSAY OF PHOSPHORUS: CPT

## 2018-09-05 PROCEDURE — 80048 BASIC METABOLIC PNL TOTAL CA: CPT

## 2018-09-05 PROCEDURE — 86300 IMMUNOASSAY TUMOR CA 15-3: CPT

## 2018-09-05 PROCEDURE — 83735 ASSAY OF MAGNESIUM: CPT

## 2018-09-07 LAB — CANCER AG27-29 SERPL-ACNC: 18.8 U/ML (ref 0–40)

## 2018-09-17 ENCOUNTER — HOSPITAL ENCOUNTER (OUTPATIENT)
Dept: RADIOLOGY | Facility: MEDICAL CENTER | Age: 53
End: 2018-09-17
Attending: RADIOLOGY
Payer: COMMERCIAL

## 2018-09-17 DIAGNOSIS — C79.31 SECONDARY MALIGNANT NEOPLASM OF BRAIN AND SPINAL CORD (HCC): ICD-10-CM

## 2018-09-17 DIAGNOSIS — C79.49 SECONDARY MALIGNANT NEOPLASM OF BRAIN AND SPINAL CORD (HCC): ICD-10-CM

## 2018-09-17 PROCEDURE — 70553 MRI BRAIN STEM W/O & W/DYE: CPT

## 2018-09-17 PROCEDURE — 700117 HCHG RX CONTRAST REV CODE 255: Performed by: FAMILY MEDICINE

## 2018-09-17 PROCEDURE — A9585 GADOBUTROL INJECTION: HCPCS | Performed by: FAMILY MEDICINE

## 2018-09-17 RX ORDER — GADOBUTROL 604.72 MG/ML
7 INJECTION INTRAVENOUS ONCE
Status: COMPLETED | OUTPATIENT
Start: 2018-09-17 | End: 2018-09-17

## 2018-09-17 RX ADMIN — GADOBUTROL 7 ML: 604.72 INJECTION INTRAVENOUS at 10:30

## 2018-09-27 ENCOUNTER — HOSPITAL ENCOUNTER (OUTPATIENT)
Dept: LAB | Facility: MEDICAL CENTER | Age: 53
End: 2018-09-27
Attending: INTERNAL MEDICINE
Payer: COMMERCIAL

## 2018-09-27 LAB
BASOPHILS # BLD AUTO: 1.1 % (ref 0–1.8)
BASOPHILS # BLD: 0.05 K/UL (ref 0–0.12)
EOSINOPHIL # BLD AUTO: 0.22 K/UL (ref 0–0.51)
EOSINOPHIL NFR BLD: 5.2 % (ref 0–6.9)
ERYTHROCYTE [DISTWIDTH] IN BLOOD BY AUTOMATED COUNT: 56.2 FL (ref 35.9–50)
HCT VFR BLD AUTO: 32.1 % (ref 37–47)
HGB BLD-MCNC: 10.9 G/DL (ref 12–16)
LYMPHOCYTES # BLD AUTO: 1.88 K/UL (ref 1–4.8)
LYMPHOCYTES NFR BLD: 44.8 % (ref 22–41)
MANUAL DIFF BLD: NORMAL
MCH RBC QN AUTO: 32.2 PG (ref 27–33)
MCHC RBC AUTO-ENTMCNC: 34 G/DL (ref 33.6–35)
MCV RBC AUTO: 95 FL (ref 81.4–97.8)
MONOCYTES # BLD AUTO: 0.04 K/UL (ref 0–0.85)
MONOCYTES NFR BLD AUTO: 1 % (ref 0–13.4)
MORPHOLOGY BLD-IMP: NORMAL
NEUTROPHILS # BLD AUTO: 2.01 K/UL (ref 2–7.15)
NEUTROPHILS NFR BLD: 47.9 % (ref 44–72)
NRBC # BLD AUTO: 0 K/UL
NRBC BLD-RTO: 0 /100 WBC
PLATELET # BLD AUTO: 220 K/UL (ref 164–446)
PLATELET BLD QL SMEAR: NORMAL
PMV BLD AUTO: 9.5 FL (ref 9–12.9)
RBC # BLD AUTO: 3.38 M/UL (ref 4.2–5.4)
RBC BLD AUTO: NORMAL
WBC # BLD AUTO: 4.2 K/UL (ref 4.8–10.8)

## 2018-09-27 PROCEDURE — 85007 BL SMEAR W/DIFF WBC COUNT: CPT

## 2018-09-27 PROCEDURE — 36415 COLL VENOUS BLD VENIPUNCTURE: CPT

## 2018-09-27 PROCEDURE — 85027 COMPLETE CBC AUTOMATED: CPT

## 2018-09-27 PROCEDURE — 80053 COMPREHEN METABOLIC PANEL: CPT

## 2018-09-28 LAB
ALBUMIN SERPL BCP-MCNC: 3.6 G/DL (ref 3.2–4.9)
ALBUMIN/GLOB SERPL: 1.1 G/DL
ALP SERPL-CCNC: 50 U/L (ref 30–99)
ALT SERPL-CCNC: 15 U/L (ref 2–50)
ANION GAP SERPL CALC-SCNC: 6 MMOL/L (ref 0–11.9)
AST SERPL-CCNC: 15 U/L (ref 12–45)
BILIRUB SERPL-MCNC: 0.4 MG/DL (ref 0.1–1.5)
BUN SERPL-MCNC: 18 MG/DL (ref 8–22)
CALCIUM SERPL-MCNC: 9.1 MG/DL (ref 8.5–10.5)
CHLORIDE SERPL-SCNC: 106 MMOL/L (ref 96–112)
CO2 SERPL-SCNC: 26 MMOL/L (ref 20–33)
CREAT SERPL-MCNC: 0.92 MG/DL (ref 0.5–1.4)
FASTING STATUS PATIENT QL REPORTED: NORMAL
GLOBULIN SER CALC-MCNC: 3.4 G/DL (ref 1.9–3.5)
GLUCOSE SERPL-MCNC: 127 MG/DL (ref 65–99)
POTASSIUM SERPL-SCNC: 3.5 MMOL/L (ref 3.6–5.5)
PROT SERPL-MCNC: 7 G/DL (ref 6–8.2)
SODIUM SERPL-SCNC: 138 MMOL/L (ref 135–145)

## 2018-10-31 ENCOUNTER — HOSPITAL ENCOUNTER (OUTPATIENT)
Dept: LAB | Facility: MEDICAL CENTER | Age: 53
End: 2018-10-31
Attending: INTERNAL MEDICINE
Payer: COMMERCIAL

## 2018-10-31 ENCOUNTER — HOSPITAL ENCOUNTER (OUTPATIENT)
Dept: RADIOLOGY | Facility: MEDICAL CENTER | Age: 53
End: 2018-10-31
Attending: INTERNAL MEDICINE
Payer: COMMERCIAL

## 2018-10-31 DIAGNOSIS — C50.412 MALIGNANT NEOPLASM OF UPPER-OUTER QUADRANT OF LEFT FEMALE BREAST, UNSPECIFIED ESTROGEN RECEPTOR STATUS (HCC): ICD-10-CM

## 2018-10-31 LAB
ALBUMIN SERPL BCP-MCNC: 4.3 G/DL (ref 3.2–4.9)
ALBUMIN/GLOB SERPL: 1.7 G/DL
ALP SERPL-CCNC: 50 U/L (ref 30–99)
ALT SERPL-CCNC: 21 U/L (ref 2–50)
ANION GAP SERPL CALC-SCNC: 6 MMOL/L (ref 0–11.9)
AST SERPL-CCNC: 17 U/L (ref 12–45)
BILIRUB SERPL-MCNC: 0.5 MG/DL (ref 0.1–1.5)
BUN SERPL-MCNC: 26 MG/DL (ref 8–22)
CALCIUM SERPL-MCNC: 9.2 MG/DL (ref 8.5–10.5)
CHLORIDE SERPL-SCNC: 107 MMOL/L (ref 96–112)
CO2 SERPL-SCNC: 26 MMOL/L (ref 20–33)
CREAT SERPL-MCNC: 0.9 MG/DL (ref 0.5–1.4)
GLOBULIN SER CALC-MCNC: 2.6 G/DL (ref 1.9–3.5)
GLUCOSE SERPL-MCNC: 87 MG/DL (ref 65–99)
MAGNESIUM SERPL-MCNC: 2.3 MG/DL (ref 1.5–2.5)
PHOSPHATE SERPL-MCNC: 2.9 MG/DL (ref 2.5–4.5)
POTASSIUM SERPL-SCNC: 3.9 MMOL/L (ref 3.6–5.5)
PROT SERPL-MCNC: 6.9 G/DL (ref 6–8.2)
SODIUM SERPL-SCNC: 139 MMOL/L (ref 135–145)

## 2018-10-31 PROCEDURE — 83735 ASSAY OF MAGNESIUM: CPT

## 2018-10-31 PROCEDURE — A9552 F18 FDG: HCPCS

## 2018-10-31 PROCEDURE — 80053 COMPREHEN METABOLIC PANEL: CPT

## 2018-10-31 PROCEDURE — 84100 ASSAY OF PHOSPHORUS: CPT

## 2018-10-31 PROCEDURE — 86300 IMMUNOASSAY TUMOR CA 15-3: CPT

## 2018-11-02 LAB — CANCER AG27-29 SERPL-ACNC: 20.3 U/ML (ref 0–40)

## 2018-11-19 ENCOUNTER — HOSPITAL ENCOUNTER (OUTPATIENT)
Dept: RADIOLOGY | Facility: MEDICAL CENTER | Age: 53
End: 2018-11-19
Attending: RADIOLOGY
Payer: COMMERCIAL

## 2018-11-19 DIAGNOSIS — C79.31 SECONDARY MALIGNANT NEOPLASM OF BRAIN AND SPINAL CORD (HCC): ICD-10-CM

## 2018-11-19 DIAGNOSIS — C79.49 SECONDARY MALIGNANT NEOPLASM OF BRAIN AND SPINAL CORD (HCC): ICD-10-CM

## 2018-11-19 PROCEDURE — A9585 GADOBUTROL INJECTION: HCPCS | Performed by: RADIOLOGY

## 2018-11-19 PROCEDURE — 700117 HCHG RX CONTRAST REV CODE 255: Performed by: RADIOLOGY

## 2018-11-19 PROCEDURE — 70553 MRI BRAIN STEM W/O & W/DYE: CPT

## 2018-11-19 RX ORDER — GADOBUTROL 604.72 MG/ML
6 INJECTION INTRAVENOUS ONCE
Status: COMPLETED | OUTPATIENT
Start: 2018-11-19 | End: 2018-11-19

## 2018-11-19 RX ADMIN — GADOBUTROL 6 ML: 604.72 INJECTION INTRAVENOUS at 10:25

## 2018-11-21 ENCOUNTER — HOSPITAL ENCOUNTER (OUTPATIENT)
Dept: HOSPITAL 8 - ROC | Age: 53
Discharge: HOME | End: 2018-11-21
Attending: RADIOLOGY
Payer: COMMERCIAL

## 2018-11-21 DIAGNOSIS — C79.31: ICD-10-CM

## 2018-11-21 DIAGNOSIS — Z17.0: ICD-10-CM

## 2018-11-21 DIAGNOSIS — Z08: Primary | ICD-10-CM

## 2018-11-21 DIAGNOSIS — C50.912: ICD-10-CM

## 2018-11-21 PROCEDURE — 99213 OFFICE O/P EST LOW 20 MIN: CPT

## 2018-11-21 PROCEDURE — G0463 HOSPITAL OUTPT CLINIC VISIT: HCPCS

## 2018-11-28 ENCOUNTER — HOSPITAL ENCOUNTER (OUTPATIENT)
Dept: LAB | Facility: MEDICAL CENTER | Age: 53
End: 2018-11-28
Attending: INTERNAL MEDICINE
Payer: COMMERCIAL

## 2018-11-28 PROCEDURE — 80053 COMPREHEN METABOLIC PANEL: CPT

## 2018-11-28 PROCEDURE — 84100 ASSAY OF PHOSPHORUS: CPT

## 2018-11-29 LAB
ALBUMIN SERPL BCP-MCNC: 4.1 G/DL (ref 3.2–4.9)
ALBUMIN/GLOB SERPL: 1.5 G/DL
ALP SERPL-CCNC: 51 U/L (ref 30–99)
ALT SERPL-CCNC: 17 U/L (ref 2–50)
ANION GAP SERPL CALC-SCNC: 8 MMOL/L (ref 0–11.9)
AST SERPL-CCNC: 16 U/L (ref 12–45)
BILIRUB SERPL-MCNC: 0.3 MG/DL (ref 0.1–1.5)
BUN SERPL-MCNC: 18 MG/DL (ref 8–22)
CALCIUM SERPL-MCNC: 9.2 MG/DL (ref 8.5–10.5)
CHLORIDE SERPL-SCNC: 105 MMOL/L (ref 96–112)
CO2 SERPL-SCNC: 26 MMOL/L (ref 20–33)
CREAT SERPL-MCNC: 0.9 MG/DL (ref 0.5–1.4)
GLOBULIN SER CALC-MCNC: 2.8 G/DL (ref 1.9–3.5)
GLUCOSE SERPL-MCNC: 115 MG/DL (ref 65–99)
PHOSPHATE SERPL-MCNC: 3.1 MG/DL (ref 2.5–4.5)
POTASSIUM SERPL-SCNC: 3.9 MMOL/L (ref 3.6–5.5)
PROT SERPL-MCNC: 6.9 G/DL (ref 6–8.2)
SODIUM SERPL-SCNC: 139 MMOL/L (ref 135–145)

## 2018-12-26 ENCOUNTER — HOSPITAL ENCOUNTER (OUTPATIENT)
Dept: LAB | Facility: MEDICAL CENTER | Age: 53
End: 2018-12-26
Attending: INTERNAL MEDICINE
Payer: COMMERCIAL

## 2018-12-26 LAB
ALBUMIN SERPL BCP-MCNC: 4 G/DL (ref 3.2–4.9)
ALBUMIN/GLOB SERPL: 1.4 G/DL
ALP SERPL-CCNC: 58 U/L (ref 30–99)
ALT SERPL-CCNC: 40 U/L (ref 2–50)
ANION GAP SERPL CALC-SCNC: 6 MMOL/L (ref 0–11.9)
AST SERPL-CCNC: 27 U/L (ref 12–45)
BILIRUB SERPL-MCNC: 0.4 MG/DL (ref 0.1–1.5)
BUN SERPL-MCNC: 15 MG/DL (ref 8–22)
CALCIUM SERPL-MCNC: 9.4 MG/DL (ref 8.5–10.5)
CHLORIDE SERPL-SCNC: 106 MMOL/L (ref 96–112)
CO2 SERPL-SCNC: 27 MMOL/L (ref 20–33)
CREAT SERPL-MCNC: 0.77 MG/DL (ref 0.5–1.4)
GLOBULIN SER CALC-MCNC: 2.8 G/DL (ref 1.9–3.5)
GLUCOSE SERPL-MCNC: 92 MG/DL (ref 65–99)
MAGNESIUM SERPL-MCNC: 2.1 MG/DL (ref 1.5–2.5)
PHOSPHATE SERPL-MCNC: 2.4 MG/DL (ref 2.5–4.5)
POTASSIUM SERPL-SCNC: 4.3 MMOL/L (ref 3.6–5.5)
PROT SERPL-MCNC: 6.8 G/DL (ref 6–8.2)
SODIUM SERPL-SCNC: 139 MMOL/L (ref 135–145)

## 2018-12-26 PROCEDURE — 83735 ASSAY OF MAGNESIUM: CPT

## 2018-12-26 PROCEDURE — 84100 ASSAY OF PHOSPHORUS: CPT

## 2018-12-26 PROCEDURE — 80053 COMPREHEN METABOLIC PANEL: CPT

## 2019-01-21 ENCOUNTER — HOSPITAL ENCOUNTER (OUTPATIENT)
Dept: RADIOLOGY | Facility: MEDICAL CENTER | Age: 54
End: 2019-01-21
Attending: RADIOLOGY
Payer: COMMERCIAL

## 2019-01-21 DIAGNOSIS — C79.31 SECONDARY MALIGNANT NEOPLASM OF BRAIN AND SPINAL CORD (HCC): ICD-10-CM

## 2019-01-21 DIAGNOSIS — C79.49 SECONDARY MALIGNANT NEOPLASM OF BRAIN AND SPINAL CORD (HCC): ICD-10-CM

## 2019-01-21 PROCEDURE — A9585 GADOBUTROL INJECTION: HCPCS | Performed by: RADIOLOGY

## 2019-01-21 PROCEDURE — 70553 MRI BRAIN STEM W/O & W/DYE: CPT

## 2019-01-21 PROCEDURE — 700117 HCHG RX CONTRAST REV CODE 255: Performed by: RADIOLOGY

## 2019-01-21 RX ORDER — GADOBUTROL 604.72 MG/ML
7 INJECTION INTRAVENOUS ONCE
Status: COMPLETED | OUTPATIENT
Start: 2019-01-21 | End: 2019-01-21

## 2019-01-21 RX ADMIN — GADOBUTROL 7 ML: 604.72 INJECTION INTRAVENOUS at 15:36

## 2019-01-23 ENCOUNTER — HOSPITAL ENCOUNTER (OUTPATIENT)
Dept: HOSPITAL 8 - ROC | Age: 54
Discharge: HOME | End: 2019-01-23
Attending: RADIOLOGY
Payer: COMMERCIAL

## 2019-01-23 ENCOUNTER — HOSPITAL ENCOUNTER (OUTPATIENT)
Dept: LAB | Facility: MEDICAL CENTER | Age: 54
End: 2019-01-23
Attending: INTERNAL MEDICINE
Payer: COMMERCIAL

## 2019-01-23 DIAGNOSIS — Z08: Primary | ICD-10-CM

## 2019-01-23 DIAGNOSIS — C79.31: ICD-10-CM

## 2019-01-23 LAB
ALBUMIN SERPL BCP-MCNC: 4.2 G/DL (ref 3.2–4.9)
ALBUMIN/GLOB SERPL: 1.5 G/DL
ALP SERPL-CCNC: 49 U/L (ref 30–99)
ALT SERPL-CCNC: 26 U/L (ref 2–50)
ANION GAP SERPL CALC-SCNC: 9 MMOL/L (ref 0–11.9)
AST SERPL-CCNC: 22 U/L (ref 12–45)
BILIRUB SERPL-MCNC: 0.4 MG/DL (ref 0.1–1.5)
BUN SERPL-MCNC: 17 MG/DL (ref 8–22)
CALCIUM SERPL-MCNC: 9.2 MG/DL (ref 8.5–10.5)
CHLORIDE SERPL-SCNC: 104 MMOL/L (ref 96–112)
CO2 SERPL-SCNC: 25 MMOL/L (ref 20–33)
CREAT SERPL-MCNC: 0.85 MG/DL (ref 0.5–1.4)
GLOBULIN SER CALC-MCNC: 2.8 G/DL (ref 1.9–3.5)
GLUCOSE SERPL-MCNC: 97 MG/DL (ref 65–99)
POTASSIUM SERPL-SCNC: 3.9 MMOL/L (ref 3.6–5.5)
PROT SERPL-MCNC: 7 G/DL (ref 6–8.2)
SODIUM SERPL-SCNC: 138 MMOL/L (ref 135–145)

## 2019-01-23 PROCEDURE — 86300 IMMUNOASSAY TUMOR CA 15-3: CPT

## 2019-01-23 PROCEDURE — 80053 COMPREHEN METABOLIC PANEL: CPT

## 2019-01-23 PROCEDURE — 99213 OFFICE O/P EST LOW 20 MIN: CPT

## 2019-01-23 PROCEDURE — G0463 HOSPITAL OUTPT CLINIC VISIT: HCPCS

## 2019-01-25 LAB — CANCER AG27-29 SERPL-ACNC: 15.7 U/ML (ref 0–40)

## 2019-02-11 ENCOUNTER — OFFICE VISIT (OUTPATIENT)
Dept: DERMATOLOGY | Facility: IMAGING CENTER | Age: 54
End: 2019-02-11
Payer: COMMERCIAL

## 2019-02-11 VITALS — TEMPERATURE: 98.2 F | BODY MASS INDEX: 22.66 KG/M2 | WEIGHT: 136 LBS | HEIGHT: 65 IN

## 2019-02-11 DIAGNOSIS — R20.8 SKIN PAIN: ICD-10-CM

## 2019-02-11 DIAGNOSIS — L71.9 ROSACEA: ICD-10-CM

## 2019-02-11 DIAGNOSIS — D48.5 NEOPLASM OF UNCERTAIN BEHAVIOR OF SKIN: ICD-10-CM

## 2019-02-11 PROCEDURE — 99213 OFFICE O/P EST LOW 20 MIN: CPT | Performed by: DERMATOLOGY

## 2019-02-11 RX ORDER — IVERMECTIN 10 MG/G
1 CREAM TOPICAL DAILY
Qty: 1 TUBE | Refills: 3 | Status: SHIPPED | OUTPATIENT
Start: 2019-02-11 | End: 2019-03-06 | Stop reason: SDUPTHER

## 2019-02-11 RX ORDER — MINOCYCLINE HYDROCHLORIDE 100 MG/1
CAPSULE ORAL
Qty: 150 CAP | Refills: 1 | Status: SHIPPED | OUTPATIENT
Start: 2019-02-11 | End: 2020-02-04

## 2019-02-11 ASSESSMENT — ENCOUNTER SYMPTOMS
CHILLS: 0
FEVER: 0

## 2019-02-11 NOTE — PROGRESS NOTES
Dermatology Return Patient Visit    Chief Complaint   Patient presents with   • Rash       Subjective:     HPI:   Michelle Diana is a 52 y.o. female presenting for    HPI rash , turns into blisters   Onset: 4 years - most recent flare up a few months ago  Aggravating factors: cold weather   Alleviating factors: minocycline - 2 courses that helped in the past  New creams/topicals: yes ,   New medications (up to last 6 months): no  New travel: no  Other exposures: no  Treatments: none currently; has additionally tried metrogel - did not help at all    Growth on the left chin  Used to be dark, got lighter  Now just has a firm spot there  +pruritus/bothersome, never improved  No treatments    History of skin cancer: No  History of precancers/actinic keratoses: Yes, Details: cryo, shave bx  History of biopsies:Yes, Details: was told Dx precancers  History of blistering/severe sunburns:Yes, Details: as achild  Family history of skin cancer:No  Family history of atypical moles:No  Has metastatic breast CA, on systemic treatment (PO)        Rash   Pertinent negatives include no fever.       Past Medical History:   Diagnosis Date   • Cancer (HCC)     left breast   • High cholesterol        Current Outpatient Prescriptions on File Prior to Visit   Medication Sig Dispense Refill   • VERZENIO 150 MG Tab Take 150 mg by mouth 2 Times a Day.     • Fulvestrant (FASLODEX IM) by Intramuscular route.     • letrozole (FEMARA) 2.5 MG Tab Take 2.5 mg by mouth every evening.     • atorvastatin (LIPITOR) 80 MG tablet Take 80 mg by mouth every day.     • aspirin EC (ECOTRIN) 81 MG Tablet Delayed Response Take 81 mg by mouth every day.     • CALCIUM PO Take 2,000 mg by mouth every day.       No current facility-administered medications on file prior to visit.        No Known Allergies    No family history on file.    Social History     Social History   • Marital status:      Spouse name: N/A   • Number of children: N/A   •  "Years of education: N/A     Occupational History   • Not on file.     Social History Main Topics   • Smoking status: Never Smoker   • Smokeless tobacco: Never Used   • Alcohol use No   • Drug use: No   • Sexual activity: Not on file     Other Topics Concern   • Not on file     Social History Narrative   • No narrative on file       Review of Systems   Constitutional: Negative for chills and fever.   Skin: Positive for rash. Negative for itching.   All other systems reviewed and are negative.       Objective:     A focused cutaneous exam was completed including: scalp, hair, ears, face, eyelids, conjunctiva, lips,  neck, hands/digits and fingernails with the following pertinent findings listed below. Remaining above-listed examined areas within normal limits / negative for rashes or lesions.    Temperature 36.8 °C (98.2 °F), height 1.651 m (5' 5\"), weight 61.7 kg (136 lb), not currently breastfeeding.    Physical Exam   Constitutional: She is oriented to person, place, and time and well-developed, well-nourished, and in no distress.   HENT:   Head: Normocephalic and atraumatic.       Right Ear: External ear normal.   Left Ear: External ear normal.   Nose: Nose normal.   Eyes: Conjunctivae and lids are normal.   Neck: Normal range of motion.   Pulmonary/Chest: Effort normal.   Neurological: She is alert and oriented to person, place, and time.   Skin: Skin is warm and dry.   Psychiatric: Mood and affect normal.       DATA: none applicable to review    Assessment and Plan:     1. Neoplasm of uncertain behavior of skin, +Skin pain  - patient to make appointment for procedure per her schedule (punch removal)    2. Rosacea - papulopusutular  - educated patient about diagnosis, management options, and expectations of treatment  - start minocycline 100mg BID x 6 weeks, then decrease to daily 100mg daily; s/e including, but not limited to, dizziness, fatigue, arthralgias, autoimmune hepatitis, DRESS, drug induced SCLE, " hypersensitivity reaction, hyperpigmentation, discussed  - start soolantra 1% cream daily to BID to the active area on the face  - discussed importance of regular sun protection/sunscreen use, SPF 30 or greater with broad spectrum coverage, need for reapplication every  minutes  - trigger avoidance    Followup: Return in about 2 weeks (around 2/25/2019) for procedure.    Christin Wesley M.D.

## 2019-02-19 ENCOUNTER — TELEPHONE (OUTPATIENT)
Dept: DERMATOLOGY | Facility: IMAGING CENTER | Age: 54
End: 2019-02-19

## 2019-02-19 NOTE — TELEPHONE ENCOUNTER
Soolantra was denied through insurance . Stating they require step therapy treatment first . Step one medications are   Metronidazole cream,gel,lotion or Rosadan cream, gel .  Please advise

## 2019-02-19 NOTE — TELEPHONE ENCOUNTER
Called [facility] , gave additional information . Medication was approved Case # [id number] 01/20/2019-02/19/2020

## 2019-02-20 ENCOUNTER — HOSPITAL ENCOUNTER (OUTPATIENT)
Dept: LAB | Facility: MEDICAL CENTER | Age: 54
End: 2019-02-20
Attending: INTERNAL MEDICINE
Payer: COMMERCIAL

## 2019-02-20 LAB
ALBUMIN SERPL BCP-MCNC: 4.1 G/DL (ref 3.2–4.9)
ALBUMIN/GLOB SERPL: 1.3 G/DL
ALP SERPL-CCNC: 55 U/L (ref 30–99)
ALT SERPL-CCNC: 21 U/L (ref 2–50)
ANION GAP SERPL CALC-SCNC: 7 MMOL/L (ref 0–11.9)
AST SERPL-CCNC: 18 U/L (ref 12–45)
BILIRUB SERPL-MCNC: 0.3 MG/DL (ref 0.1–1.5)
BUN SERPL-MCNC: 16 MG/DL (ref 8–22)
CALCIUM SERPL-MCNC: 9.2 MG/DL (ref 8.5–10.5)
CHLORIDE SERPL-SCNC: 104 MMOL/L (ref 96–112)
CO2 SERPL-SCNC: 27 MMOL/L (ref 20–33)
CREAT SERPL-MCNC: 0.84 MG/DL (ref 0.5–1.4)
GLOBULIN SER CALC-MCNC: 3.1 G/DL (ref 1.9–3.5)
GLUCOSE SERPL-MCNC: 87 MG/DL (ref 65–99)
MAGNESIUM SERPL-MCNC: 2.1 MG/DL (ref 1.5–2.5)
PHOSPHATE SERPL-MCNC: 3.2 MG/DL (ref 2.5–4.5)
POTASSIUM SERPL-SCNC: 3.9 MMOL/L (ref 3.6–5.5)
PROT SERPL-MCNC: 7.2 G/DL (ref 6–8.2)
SODIUM SERPL-SCNC: 138 MMOL/L (ref 135–145)

## 2019-02-20 PROCEDURE — 80053 COMPREHEN METABOLIC PANEL: CPT

## 2019-02-20 PROCEDURE — 86300 IMMUNOASSAY TUMOR CA 15-3: CPT

## 2019-02-20 PROCEDURE — 84100 ASSAY OF PHOSPHORUS: CPT

## 2019-02-20 PROCEDURE — 83735 ASSAY OF MAGNESIUM: CPT

## 2019-02-22 LAB — CANCER AG27-29 SERPL-ACNC: 16.2 U/ML (ref 0–40)

## 2019-02-26 ENCOUNTER — HOSPITAL ENCOUNTER (OUTPATIENT)
Dept: RADIOLOGY | Facility: MEDICAL CENTER | Age: 54
End: 2019-02-26
Attending: INTERNAL MEDICINE
Payer: COMMERCIAL

## 2019-02-26 DIAGNOSIS — C50.011 PAGET'S DISEASE OF THE BREAST, RIGHT (HCC): ICD-10-CM

## 2019-02-26 DIAGNOSIS — C50.412 MALIGNANT NEOPLASM OF UPPER-OUTER QUADRANT OF LEFT FEMALE BREAST, UNSPECIFIED ESTROGEN RECEPTOR STATUS (HCC): ICD-10-CM

## 2019-02-26 PROCEDURE — A9503 TC99M MEDRONATE: HCPCS

## 2019-02-26 PROCEDURE — 71260 CT THORAX DX C+: CPT

## 2019-02-26 PROCEDURE — 700117 HCHG RX CONTRAST REV CODE 255: Performed by: INTERNAL MEDICINE

## 2019-02-26 RX ADMIN — IOHEXOL 50 ML: 240 INJECTION, SOLUTION INTRATHECAL; INTRAVASCULAR; INTRAVENOUS; ORAL at 13:15

## 2019-02-26 RX ADMIN — IOHEXOL 100 ML: 350 INJECTION, SOLUTION INTRAVENOUS at 13:15

## 2019-03-01 ENCOUNTER — HOSPITAL ENCOUNTER (OUTPATIENT)
Dept: CARDIOLOGY | Facility: MEDICAL CENTER | Age: 54
End: 2019-03-01
Attending: INTERNAL MEDICINE
Payer: COMMERCIAL

## 2019-03-01 DIAGNOSIS — R55 SYNCOPE AND COLLAPSE: ICD-10-CM

## 2019-03-01 PROCEDURE — 93306 TTE W/DOPPLER COMPLETE: CPT

## 2019-03-05 LAB
LV EJECT FRACT  99904: 70
LV EJECT FRACT MOD 2C 99903: 71.81
LV EJECT FRACT MOD 4C 99902: 72.61
LV EJECT FRACT MOD BP 99901: 70.84

## 2019-03-05 PROCEDURE — 93306 TTE W/DOPPLER COMPLETE: CPT | Mod: 26 | Performed by: INTERNAL MEDICINE

## 2019-03-06 ENCOUNTER — HOSPITAL ENCOUNTER (OUTPATIENT)
Facility: MEDICAL CENTER | Age: 54
End: 2019-03-06
Attending: DERMATOLOGY
Payer: COMMERCIAL

## 2019-03-06 ENCOUNTER — OFFICE VISIT (OUTPATIENT)
Dept: DERMATOLOGY | Facility: IMAGING CENTER | Age: 54
End: 2019-03-06
Payer: COMMERCIAL

## 2019-03-06 DIAGNOSIS — R20.8 SKIN PAIN: ICD-10-CM

## 2019-03-06 DIAGNOSIS — D48.5 NEOPLASM OF UNCERTAIN BEHAVIOR OF SKIN: ICD-10-CM

## 2019-03-06 DIAGNOSIS — L71.9 ROSACEA: ICD-10-CM

## 2019-03-06 PROCEDURE — 11104 PUNCH BX SKIN SINGLE LESION: CPT | Performed by: DERMATOLOGY

## 2019-03-06 PROCEDURE — 88305 TISSUE EXAM BY PATHOLOGIST: CPT

## 2019-03-06 RX ORDER — IVERMECTIN 10 MG/G
1 CREAM TOPICAL DAILY
Qty: 1 TUBE | Refills: 3 | Status: ON HOLD | OUTPATIENT
Start: 2019-03-06 | End: 2021-03-05

## 2019-03-06 ASSESSMENT — ENCOUNTER SYMPTOMS
CHILLS: 0
FEVER: 0

## 2019-03-06 NOTE — PROGRESS NOTES
Dermatology Return Patient Visit    Chief Complaint   Patient presents with   • Follow-Up       Subjective:     HPI:   Michelle Diana is a 52 y.o. female presenting for    HPI: here for removal of growth on the chin today   Time present: 10 years   Used to be dark, got lighter  Now just has a firm spot there  +pruritus/bothersome painful rarely, never improved  No treatments    Rosacea  Recently improved on it's own, only mild activity  Did not fill minocycline  Had an issue with soolantra (would not deliver to [place])  Onset: 4 years - most recent flare up a few months ago  Aggravating factors: cold weather   Alleviating factors: minocycline - 2 courses that helped in the past  New creams/topicals: yes ,   New medications (up to last 6 months): no  New travel: no  Other exposures: no  Prior treatments: topical metronidazole - did not help at all    History of skin cancer: No  History of precancers/actinic keratoses: Yes, Details: cryo, shave bx  History of biopsies:Yes, Details: was told Dx precancers  History of blistering/severe sunburns:Yes, Details: as achild  Family history of skin cancer:No  Family history of atypical moles:No  Has metastatic breast CA, on systemic treatment (PO)        Rash   Pertinent negatives include no fever.       Past Medical History:   Diagnosis Date   • Cancer (HCC)     left breast   • High cholesterol        Current Outpatient Prescriptions on File Prior to Visit   Medication Sig Dispense Refill   • minocycline (MINOCIN) 100 MG Cap 1 capsule twice daily for 6 weeks, then decrease to 1 capsule daily 150 Cap 1   • Ivermectin 1 % Cream 1 Application by Apply externally route every day. 1 Tube 3   • VERZENIO 150 MG Tab Take 150 mg by mouth 2 Times a Day.     • Fulvestrant (FASLODEX IM) by Intramuscular route.     • letrozole (FEMARA) 2.5 MG Tab Take 2.5 mg by mouth every evening.     • atorvastatin (LIPITOR) 80 MG tablet Take 80 mg by mouth every day.     • aspirin EC (ECOTRIN) 81  MG Tablet Delayed Response Take 81 mg by mouth every day.     • CALCIUM PO Take 2,000 mg by mouth every day.       No current facility-administered medications on file prior to visit.        No Known Allergies    No family history on file.    Social History     Social History   • Marital status:      Spouse name: N/A   • Number of children: N/A   • Years of education: N/A     Occupational History   • Not on file.     Social History Main Topics   • Smoking status: Never Smoker   • Smokeless tobacco: Never Used   • Alcohol use No   • Drug use: No   • Sexual activity: Not on file     Other Topics Concern   • Not on file     Social History Narrative   • No narrative on file       Review of Systems   Constitutional: Negative for chills and fever.   Skin: Positive for itching and rash.   All other systems reviewed and are negative.       Objective:     A focused cutaneous exam was completed including: scalp, hair, ears, face, eyelids, conjunctiva, lips,  neck, hands/digits and fingernails with the following pertinent findings listed below. Remaining above-listed examined areas within normal limits / negative for rashes or lesions.    not currently breastfeeding.    Physical Exam   Constitutional: She is oriented to person, place, and time and well-developed, well-nourished, and in no distress.   HENT:   Head: Normocephalic and atraumatic.       Right Ear: External ear normal.   Left Ear: External ear normal.   Nose: Nose normal.   Eyes: Conjunctivae and lids are normal.   Neck: Normal range of motion.   Pulmonary/Chest: Effort normal.   Neurological: She is alert and oriented to person, place, and time.   Skin: Skin is warm and dry.   Psychiatric: Mood and affect normal.       DATA: none applicable to review    Assessment and Plan:     1. Neoplasm of uncertain behavior of skin, +Skin pain  Procedure Note   Procedure: Biopsy by punch technique  Location: left chin  Preoperative diagnosis:inflamed/scared IDN vs EIC  vs other  Risks, benefits and alternatives of procedure discussed and written informed consent obtained. Time out completed. Area of biopsy prepped with alcohol. Anesthesia with 1% lidocaine with epinephrine administered with a 30 gauge needle. 4  mm punch biopsy of site performed. Hemostasis achieved with pressure and 5.0 prolene sutures. Vaseline applied to wound with bandage. Patient tolerated procedure well, and there were no complications.  The pathology specimen was sent to the lab via the staff.  Wound care was discussed with the patient.     2. Rosacea - papulopusutular, improved  - educated patient about diagnosis, management options, and expectations of treatment  - sent new RX for soolantra 1% cream daily to BID to the active area on the face (local pharmacy)  - discussed importance of regular sun protection/sunscreen use, SPF 30 or greater with broad spectrum coverage, need for reapplication every  minutes  - trigger avoidance    Followup: Return in about 5 days (around 3/11/2019).    Christin Wesley M.D.

## 2019-03-07 LAB — PATHOLOGY CONSULT NOTE: NORMAL

## 2019-03-11 ENCOUNTER — NON-PROVIDER VISIT (OUTPATIENT)
Dept: DERMATOLOGY | Facility: IMAGING CENTER | Age: 54
End: 2019-03-11
Payer: COMMERCIAL

## 2019-03-11 NOTE — NON-PROVIDER
Michelle Diana is a 53 y.o. female here for a Non-Provider Visit for Suture Removal.    Sutures were placed by Dr Wesley on date: 3/6/19  Skin is healed: Yes  Provider notified if skin is not healed, or if there is redness, heat, pain, or drainage from incision: N\A  Sutures removed.   Mastisol and steristips are placed: No    Advised to use emollient (vaseline, aquaphor, etc.) as needed, avoid peroxide and antibiotic ointment to reduce irritation.     Path report has been reviewed by provider.  Path report has been reviewed with patient.

## 2019-04-12 ENCOUNTER — HOSPITAL ENCOUNTER (OUTPATIENT)
Dept: RADIOLOGY | Facility: MEDICAL CENTER | Age: 54
End: 2019-04-12
Attending: INTERNAL MEDICINE
Payer: COMMERCIAL

## 2019-04-12 DIAGNOSIS — R55 SYNCOPE AND COLLAPSE: ICD-10-CM

## 2019-04-12 DIAGNOSIS — R93.1 ABNORMAL FINDINGS ON DIAGNOSTIC IMAGING OF HEART AND CORONARY CIRCULATION: ICD-10-CM

## 2019-04-12 PROCEDURE — A9502 TC99M TETROFOSMIN: HCPCS

## 2019-04-12 PROCEDURE — 78452 HT MUSCLE IMAGE SPECT MULT: CPT | Mod: 26 | Performed by: INTERNAL MEDICINE

## 2019-04-12 PROCEDURE — 93018 CV STRESS TEST I&R ONLY: CPT | Performed by: INTERNAL MEDICINE

## 2019-04-16 ENCOUNTER — HOSPITAL ENCOUNTER (OUTPATIENT)
Dept: RADIOLOGY | Facility: MEDICAL CENTER | Age: 54
End: 2019-04-16
Attending: RADIOLOGY
Payer: COMMERCIAL

## 2019-04-16 DIAGNOSIS — C79.31 SECONDARY MALIGNANT NEOPLASM OF BRAIN (HCC): ICD-10-CM

## 2019-04-16 PROCEDURE — 70553 MRI BRAIN STEM W/O & W/DYE: CPT

## 2019-04-16 PROCEDURE — 700117 HCHG RX CONTRAST REV CODE 255

## 2019-04-16 PROCEDURE — A9585 GADOBUTROL INJECTION: HCPCS

## 2019-04-16 RX ORDER — GADOBUTROL 604.72 MG/ML
6 INJECTION INTRAVENOUS ONCE
Status: COMPLETED | OUTPATIENT
Start: 2019-04-16 | End: 2019-04-16

## 2019-04-16 RX ADMIN — GADOBUTROL 7.5 ML: 604.72 INJECTION INTRAVENOUS at 14:03

## 2019-04-17 ENCOUNTER — HOSPITAL ENCOUNTER (OUTPATIENT)
Dept: HOSPITAL 8 - ROC | Age: 54
Discharge: HOME | End: 2019-04-17
Attending: RADIOLOGY
Payer: COMMERCIAL

## 2019-04-17 DIAGNOSIS — Z92.3: ICD-10-CM

## 2019-04-17 DIAGNOSIS — Z08: Primary | ICD-10-CM

## 2019-04-17 DIAGNOSIS — Z79.899: ICD-10-CM

## 2019-04-17 DIAGNOSIS — H53.8: ICD-10-CM

## 2019-04-17 DIAGNOSIS — Z90.12: ICD-10-CM

## 2019-04-17 DIAGNOSIS — Z85.3: ICD-10-CM

## 2019-04-17 DIAGNOSIS — Z17.0: ICD-10-CM

## 2019-04-17 DIAGNOSIS — Z85.841: ICD-10-CM

## 2019-04-17 PROCEDURE — 99213 OFFICE O/P EST LOW 20 MIN: CPT

## 2019-04-17 PROCEDURE — G0463 HOSPITAL OUTPT CLINIC VISIT: HCPCS

## 2019-06-07 ENCOUNTER — HOSPITAL ENCOUNTER (OUTPATIENT)
Dept: RADIOLOGY | Facility: MEDICAL CENTER | Age: 54
End: 2019-06-07
Attending: INTERNAL MEDICINE
Payer: COMMERCIAL

## 2019-06-07 ENCOUNTER — HOSPITAL ENCOUNTER (OUTPATIENT)
Dept: LAB | Facility: MEDICAL CENTER | Age: 54
End: 2019-06-07
Attending: FAMILY MEDICINE
Payer: COMMERCIAL

## 2019-06-07 DIAGNOSIS — C50.412 MALIGNANT NEOPLASM OF UPPER-OUTER QUADRANT OF LEFT FEMALE BREAST, UNSPECIFIED ESTROGEN RECEPTOR STATUS (HCC): ICD-10-CM

## 2019-06-07 LAB
ALBUMIN SERPL BCP-MCNC: 3.9 G/DL (ref 3.2–4.9)
ALBUMIN/GLOB SERPL: 1.3 G/DL
ALP SERPL-CCNC: 51 U/L (ref 30–99)
ALT SERPL-CCNC: 23 U/L (ref 2–50)
ANION GAP SERPL CALC-SCNC: 6 MMOL/L (ref 0–11.9)
AST SERPL-CCNC: 20 U/L (ref 12–45)
BILIRUB SERPL-MCNC: 0.4 MG/DL (ref 0.1–1.5)
BUN SERPL-MCNC: 17 MG/DL (ref 8–22)
CALCIUM SERPL-MCNC: 9 MG/DL (ref 8.5–10.5)
CHLORIDE SERPL-SCNC: 107 MMOL/L (ref 96–112)
CO2 SERPL-SCNC: 28 MMOL/L (ref 20–33)
CREAT SERPL-MCNC: 0.9 MG/DL (ref 0.5–1.4)
FASTING STATUS PATIENT QL REPORTED: NORMAL
GLOBULIN SER CALC-MCNC: 3 G/DL (ref 1.9–3.5)
GLUCOSE SERPL-MCNC: 72 MG/DL (ref 65–99)
MAGNESIUM SERPL-MCNC: 2.2 MG/DL (ref 1.5–2.5)
PHOSPHATE SERPL-MCNC: 3.4 MG/DL (ref 2.5–4.5)
POTASSIUM SERPL-SCNC: 3.8 MMOL/L (ref 3.6–5.5)
PROT SERPL-MCNC: 6.9 G/DL (ref 6–8.2)
SODIUM SERPL-SCNC: 141 MMOL/L (ref 135–145)

## 2019-06-07 PROCEDURE — 83735 ASSAY OF MAGNESIUM: CPT

## 2019-06-07 PROCEDURE — 80053 COMPREHEN METABOLIC PANEL: CPT

## 2019-06-07 PROCEDURE — 700117 HCHG RX CONTRAST REV CODE 255: Performed by: INTERNAL MEDICINE

## 2019-06-07 PROCEDURE — 71260 CT THORAX DX C+: CPT

## 2019-06-07 PROCEDURE — 84100 ASSAY OF PHOSPHORUS: CPT

## 2019-06-07 PROCEDURE — 86300 IMMUNOASSAY TUMOR CA 15-3: CPT

## 2019-06-07 PROCEDURE — 36415 COLL VENOUS BLD VENIPUNCTURE: CPT

## 2019-06-07 PROCEDURE — A9503 TC99M MEDRONATE: HCPCS

## 2019-06-07 RX ADMIN — IOHEXOL 100 ML: 350 INJECTION, SOLUTION INTRAVENOUS at 13:20

## 2019-06-07 RX ADMIN — IOHEXOL 25 ML: 240 INJECTION, SOLUTION INTRATHECAL; INTRAVASCULAR; INTRAVENOUS; ORAL at 12:22

## 2019-06-08 LAB — CANCER AG27-29 SERPL-ACNC: 15.5 U/ML (ref 0–40)

## 2019-07-19 ENCOUNTER — HOSPITAL ENCOUNTER (OUTPATIENT)
Dept: RADIOLOGY | Facility: MEDICAL CENTER | Age: 54
End: 2019-07-19
Attending: RADIOLOGY
Payer: COMMERCIAL

## 2019-07-19 DIAGNOSIS — C79.49 SECONDARY MALIGNANT NEOPLASM OF BRAIN AND SPINAL CORD (HCC): ICD-10-CM

## 2019-07-19 DIAGNOSIS — C79.31 SECONDARY MALIGNANT NEOPLASM OF BRAIN AND SPINAL CORD (HCC): ICD-10-CM

## 2019-07-19 PROCEDURE — A9585 GADOBUTROL INJECTION: HCPCS | Performed by: FAMILY MEDICINE

## 2019-07-19 PROCEDURE — 700117 HCHG RX CONTRAST REV CODE 255: Performed by: FAMILY MEDICINE

## 2019-07-19 PROCEDURE — 70553 MRI BRAIN STEM W/O & W/DYE: CPT

## 2019-07-19 RX ORDER — GADOBUTROL 604.72 MG/ML
6 INJECTION INTRAVENOUS ONCE
Status: COMPLETED | OUTPATIENT
Start: 2019-07-19 | End: 2019-07-19

## 2019-07-19 RX ADMIN — GADOBUTROL 6 ML: 604.72 INJECTION INTRAVENOUS at 10:03

## 2019-08-21 ENCOUNTER — OFFICE VISIT (OUTPATIENT)
Dept: DERMATOLOGY | Facility: IMAGING CENTER | Age: 54
End: 2019-08-21
Payer: COMMERCIAL

## 2019-08-21 DIAGNOSIS — L81.4 LENTIGINES: ICD-10-CM

## 2019-08-21 DIAGNOSIS — L82.1 SEBORRHEIC KERATOSES: ICD-10-CM

## 2019-08-21 DIAGNOSIS — D22.9 MULTIPLE NEVI: ICD-10-CM

## 2019-08-21 DIAGNOSIS — L57.0 ACTINIC KERATOSES: ICD-10-CM

## 2019-08-21 PROCEDURE — 99213 OFFICE O/P EST LOW 20 MIN: CPT | Mod: 25 | Performed by: DERMATOLOGY

## 2019-08-21 PROCEDURE — 17000 DESTRUCT PREMALG LESION: CPT | Performed by: DERMATOLOGY

## 2019-08-21 PROCEDURE — 17003 DESTRUCT PREMALG LES 2-14: CPT | Performed by: DERMATOLOGY

## 2019-08-21 ASSESSMENT — ENCOUNTER SYMPTOMS
CHILLS: 0
FEVER: 0

## 2019-10-04 ENCOUNTER — HOSPITAL ENCOUNTER (OUTPATIENT)
Dept: RADIOLOGY | Facility: MEDICAL CENTER | Age: 54
End: 2019-10-04
Attending: INTERNAL MEDICINE
Payer: COMMERCIAL

## 2019-10-04 DIAGNOSIS — C50.412 MALIGNANT NEOPLASM OF UPPER-OUTER QUADRANT OF LEFT FEMALE BREAST, UNSPECIFIED ESTROGEN RECEPTOR STATUS (HCC): ICD-10-CM

## 2019-10-04 PROCEDURE — A9503 TC99M MEDRONATE: HCPCS

## 2019-10-04 PROCEDURE — 71260 CT THORAX DX C+: CPT

## 2019-10-04 PROCEDURE — 700117 HCHG RX CONTRAST REV CODE 255: Performed by: INTERNAL MEDICINE

## 2019-10-04 RX ADMIN — IOHEXOL 25 ML: 240 INJECTION, SOLUTION INTRATHECAL; INTRAVASCULAR; INTRAVENOUS; ORAL at 15:02

## 2019-10-04 RX ADMIN — IOHEXOL 100 ML: 350 INJECTION, SOLUTION INTRAVENOUS at 14:43

## 2019-10-18 ENCOUNTER — HOSPITAL ENCOUNTER (OUTPATIENT)
Dept: RADIOLOGY | Facility: MEDICAL CENTER | Age: 54
End: 2019-10-18
Attending: RADIOLOGY
Payer: COMMERCIAL

## 2019-10-18 DIAGNOSIS — C79.49 SECONDARY MALIGNANT NEOPLASM OF BRAIN AND SPINAL CORD (HCC): ICD-10-CM

## 2019-10-18 DIAGNOSIS — C79.31 SECONDARY MALIGNANT NEOPLASM OF BRAIN AND SPINAL CORD (HCC): ICD-10-CM

## 2019-10-18 PROCEDURE — 700117 HCHG RX CONTRAST REV CODE 255

## 2019-10-18 PROCEDURE — A9576 INJ PROHANCE MULTIPACK: HCPCS

## 2019-10-18 PROCEDURE — 70553 MRI BRAIN STEM W/O & W/DYE: CPT

## 2019-10-18 RX ADMIN — GADOTERIDOL 12 ML: 279.3 INJECTION, SOLUTION INTRAVENOUS at 11:07

## 2019-11-01 ENCOUNTER — HOSPITAL ENCOUNTER (OUTPATIENT)
Dept: RADIOLOGY | Facility: MEDICAL CENTER | Age: 54
End: 2019-11-01
Attending: SURGERY
Payer: COMMERCIAL

## 2019-11-01 DIAGNOSIS — Z85.3 HX OF BREAST CANCER: ICD-10-CM

## 2019-11-01 PROCEDURE — G0279 TOMOSYNTHESIS, MAMMO: HCPCS

## 2019-11-04 ENCOUNTER — HOSPITAL ENCOUNTER (OUTPATIENT)
Dept: LAB | Facility: MEDICAL CENTER | Age: 54
End: 2019-11-04
Attending: INTERNAL MEDICINE
Payer: COMMERCIAL

## 2019-11-04 PROCEDURE — 80053 COMPREHEN METABOLIC PANEL: CPT

## 2019-11-04 PROCEDURE — 83735 ASSAY OF MAGNESIUM: CPT

## 2019-11-04 PROCEDURE — 86300 IMMUNOASSAY TUMOR CA 15-3: CPT

## 2019-11-04 PROCEDURE — 84100 ASSAY OF PHOSPHORUS: CPT

## 2019-11-04 PROCEDURE — 36415 COLL VENOUS BLD VENIPUNCTURE: CPT

## 2019-11-05 LAB
ALBUMIN SERPL BCP-MCNC: 4.1 G/DL (ref 3.2–4.9)
ALBUMIN/GLOB SERPL: 1.5 G/DL
ALP SERPL-CCNC: 54 U/L (ref 30–99)
ALT SERPL-CCNC: 39 U/L (ref 2–50)
ANION GAP SERPL CALC-SCNC: 4 MMOL/L (ref 0–11.9)
AST SERPL-CCNC: 23 U/L (ref 12–45)
BILIRUB SERPL-MCNC: 0.4 MG/DL (ref 0.1–1.5)
BUN SERPL-MCNC: 20 MG/DL (ref 8–22)
CALCIUM SERPL-MCNC: 9.8 MG/DL (ref 8.5–10.5)
CHLORIDE SERPL-SCNC: 107 MMOL/L (ref 96–112)
CO2 SERPL-SCNC: 28 MMOL/L (ref 20–33)
CREAT SERPL-MCNC: 0.98 MG/DL (ref 0.5–1.4)
GLOBULIN SER CALC-MCNC: 2.8 G/DL (ref 1.9–3.5)
GLUCOSE SERPL-MCNC: 88 MG/DL (ref 65–99)
MAGNESIUM SERPL-MCNC: 2 MG/DL (ref 1.5–2.5)
PHOSPHATE SERPL-MCNC: 3.5 MG/DL (ref 2.5–4.5)
POTASSIUM SERPL-SCNC: 4.1 MMOL/L (ref 3.6–5.5)
PROT SERPL-MCNC: 6.9 G/DL (ref 6–8.2)
SODIUM SERPL-SCNC: 139 MMOL/L (ref 135–145)

## 2019-11-06 LAB — CANCER AG27-29 SERPL-ACNC: 12.1 U/ML (ref 0–40)

## 2019-12-10 ENCOUNTER — HOSPITAL ENCOUNTER (OUTPATIENT)
Dept: LAB | Facility: MEDICAL CENTER | Age: 54
End: 2019-12-10
Attending: INTERNAL MEDICINE
Payer: COMMERCIAL

## 2019-12-10 PROCEDURE — 83735 ASSAY OF MAGNESIUM: CPT

## 2019-12-10 PROCEDURE — 84100 ASSAY OF PHOSPHORUS: CPT

## 2019-12-10 PROCEDURE — 80053 COMPREHEN METABOLIC PANEL: CPT

## 2019-12-11 LAB
ALBUMIN SERPL BCP-MCNC: 4.2 G/DL (ref 3.2–4.9)
ALBUMIN/GLOB SERPL: 1.9 G/DL
ALP SERPL-CCNC: 46 U/L (ref 30–99)
ALT SERPL-CCNC: 30 U/L (ref 2–50)
ANION GAP SERPL CALC-SCNC: 12 MMOL/L (ref 0–11.9)
AST SERPL-CCNC: 25 U/L (ref 12–45)
BILIRUB SERPL-MCNC: 0.3 MG/DL (ref 0.1–1.5)
BUN SERPL-MCNC: 19 MG/DL (ref 8–22)
CALCIUM SERPL-MCNC: 9.2 MG/DL (ref 8.5–10.5)
CHLORIDE SERPL-SCNC: 109 MMOL/L (ref 96–112)
CO2 SERPL-SCNC: 22 MMOL/L (ref 20–33)
CREAT SERPL-MCNC: 0.85 MG/DL (ref 0.5–1.4)
GLOBULIN SER CALC-MCNC: 2.2 G/DL (ref 1.9–3.5)
GLUCOSE SERPL-MCNC: 86 MG/DL (ref 65–99)
MAGNESIUM SERPL-MCNC: 1.9 MG/DL (ref 1.5–2.5)
PHOSPHATE SERPL-MCNC: 3.4 MG/DL (ref 2.5–4.5)
POTASSIUM SERPL-SCNC: 3.7 MMOL/L (ref 3.6–5.5)
PROT SERPL-MCNC: 6.4 G/DL (ref 6–8.2)
SODIUM SERPL-SCNC: 143 MMOL/L (ref 135–145)

## 2019-12-31 ENCOUNTER — HOSPITAL ENCOUNTER (OUTPATIENT)
Dept: LAB | Facility: MEDICAL CENTER | Age: 54
End: 2019-12-31
Attending: INTERNAL MEDICINE
Payer: COMMERCIAL

## 2019-12-31 LAB
ALBUMIN SERPL BCP-MCNC: 4 G/DL (ref 3.2–4.9)
ALBUMIN/GLOB SERPL: 1.4 G/DL
ALP SERPL-CCNC: 46 U/L (ref 30–99)
ALT SERPL-CCNC: 28 U/L (ref 2–50)
ANION GAP SERPL CALC-SCNC: 7 MMOL/L (ref 0–11.9)
AST SERPL-CCNC: 19 U/L (ref 12–45)
BILIRUB SERPL-MCNC: 0.3 MG/DL (ref 0.1–1.5)
BUN SERPL-MCNC: 20 MG/DL (ref 8–22)
CALCIUM SERPL-MCNC: 9.4 MG/DL (ref 8.5–10.5)
CHLORIDE SERPL-SCNC: 106 MMOL/L (ref 96–112)
CO2 SERPL-SCNC: 28 MMOL/L (ref 20–33)
CREAT SERPL-MCNC: 0.92 MG/DL (ref 0.5–1.4)
GLOBULIN SER CALC-MCNC: 2.8 G/DL (ref 1.9–3.5)
GLUCOSE SERPL-MCNC: 65 MG/DL (ref 65–99)
POTASSIUM SERPL-SCNC: 4.1 MMOL/L (ref 3.6–5.5)
PROT SERPL-MCNC: 6.8 G/DL (ref 6–8.2)
SODIUM SERPL-SCNC: 141 MMOL/L (ref 135–145)

## 2019-12-31 PROCEDURE — 80053 COMPREHEN METABOLIC PANEL: CPT

## 2020-01-29 ENCOUNTER — HOSPITAL ENCOUNTER (OUTPATIENT)
Dept: RADIOLOGY | Facility: MEDICAL CENTER | Age: 55
End: 2020-01-29
Attending: INTERNAL MEDICINE
Payer: COMMERCIAL

## 2020-01-29 ENCOUNTER — HOSPITAL ENCOUNTER (OUTPATIENT)
Dept: LAB | Facility: MEDICAL CENTER | Age: 55
End: 2020-01-29
Attending: INTERNAL MEDICINE
Payer: COMMERCIAL

## 2020-01-29 DIAGNOSIS — C50.412 MALIGNANT NEOPLASM OF UPPER-OUTER QUADRANT OF LEFT FEMALE BREAST, UNSPECIFIED ESTROGEN RECEPTOR STATUS (HCC): ICD-10-CM

## 2020-01-29 LAB
ALBUMIN SERPL BCP-MCNC: 4 G/DL (ref 3.2–4.9)
ALBUMIN/GLOB SERPL: 1.2 G/DL
ALP SERPL-CCNC: 55 U/L (ref 30–99)
ALT SERPL-CCNC: 37 U/L (ref 2–50)
ANION GAP SERPL CALC-SCNC: 8 MMOL/L (ref 0–11.9)
AST SERPL-CCNC: 25 U/L (ref 12–45)
BILIRUB SERPL-MCNC: 0.4 MG/DL (ref 0.1–1.5)
BUN SERPL-MCNC: 16 MG/DL (ref 8–22)
CALCIUM SERPL-MCNC: 9.6 MG/DL (ref 8.5–10.5)
CHLORIDE SERPL-SCNC: 101 MMOL/L (ref 96–112)
CO2 SERPL-SCNC: 27 MMOL/L (ref 20–33)
CREAT SERPL-MCNC: 0.94 MG/DL (ref 0.5–1.4)
GLOBULIN SER CALC-MCNC: 3.3 G/DL (ref 1.9–3.5)
GLUCOSE SERPL-MCNC: 124 MG/DL (ref 65–99)
MAGNESIUM SERPL-MCNC: 2.1 MG/DL (ref 1.5–2.5)
PHOSPHATE SERPL-MCNC: 2.9 MG/DL (ref 2.5–4.5)
POTASSIUM SERPL-SCNC: 3.7 MMOL/L (ref 3.6–5.5)
PROT SERPL-MCNC: 7.3 G/DL (ref 6–8.2)
SODIUM SERPL-SCNC: 136 MMOL/L (ref 135–145)

## 2020-01-29 PROCEDURE — 700117 HCHG RX CONTRAST REV CODE 255: Performed by: INTERNAL MEDICINE

## 2020-01-29 PROCEDURE — 71260 CT THORAX DX C+: CPT

## 2020-01-29 PROCEDURE — 84100 ASSAY OF PHOSPHORUS: CPT

## 2020-01-29 PROCEDURE — 83735 ASSAY OF MAGNESIUM: CPT

## 2020-01-29 PROCEDURE — 36415 COLL VENOUS BLD VENIPUNCTURE: CPT

## 2020-01-29 PROCEDURE — 80053 COMPREHEN METABOLIC PANEL: CPT

## 2020-01-29 PROCEDURE — 70553 MRI BRAIN STEM W/O & W/DYE: CPT

## 2020-01-29 PROCEDURE — A9503 TC99M MEDRONATE: HCPCS

## 2020-01-29 PROCEDURE — A9576 INJ PROHANCE MULTIPACK: HCPCS | Performed by: INTERNAL MEDICINE

## 2020-01-29 PROCEDURE — 86300 IMMUNOASSAY TUMOR CA 15-3: CPT

## 2020-01-29 RX ADMIN — IOHEXOL 100 ML: 350 INJECTION, SOLUTION INTRAVENOUS at 13:24

## 2020-01-29 RX ADMIN — IOHEXOL 25 ML: 240 INJECTION, SOLUTION INTRATHECAL; INTRAVASCULAR; INTRAVENOUS; ORAL at 13:24

## 2020-01-29 RX ADMIN — GADOTERIDOL 15 ML: 279.3 INJECTION, SOLUTION INTRAVENOUS at 14:55

## 2020-01-30 LAB — CANCER AG27-29 SERPL-ACNC: 14.8 U/ML (ref 0–40)

## 2020-02-04 ENCOUNTER — HOSPITAL ENCOUNTER (OUTPATIENT)
Dept: RADIATION ONCOLOGY | Facility: MEDICAL CENTER | Age: 55
End: 2020-02-29
Attending: RADIOLOGY
Payer: COMMERCIAL

## 2020-02-04 VITALS
HEART RATE: 75 BPM | WEIGHT: 137.5 LBS | DIASTOLIC BLOOD PRESSURE: 65 MMHG | OXYGEN SATURATION: 99 % | HEIGHT: 65 IN | BODY MASS INDEX: 22.91 KG/M2 | SYSTOLIC BLOOD PRESSURE: 138 MMHG | TEMPERATURE: 97.5 F

## 2020-02-04 DIAGNOSIS — C79.31 BREAST CANCER METASTASIZED TO BRAIN, LEFT (HCC): ICD-10-CM

## 2020-02-04 DIAGNOSIS — C50.912 BREAST CANCER METASTASIZED TO BRAIN, LEFT (HCC): ICD-10-CM

## 2020-02-04 PROCEDURE — 99205 OFFICE O/P NEW HI 60 MIN: CPT | Performed by: RADIOLOGY

## 2020-02-04 PROCEDURE — 99214 OFFICE O/P EST MOD 30 MIN: CPT | Performed by: RADIOLOGY

## 2020-02-04 RX ORDER — DEXAMETHASONE 2 MG/1
4 TABLET ORAL 2 TIMES DAILY
Qty: 10 TAB | Refills: 0 | Status: SHIPPED | OUTPATIENT
Start: 2020-02-04 | End: 2020-02-09

## 2020-02-04 ASSESSMENT — PAIN SCALES - GENERAL: PAINLEVEL: NO PAIN

## 2020-02-04 NOTE — NON-PROVIDER
"Patient was seen today in clinic with Dr. Darnell for consultation.  Vitals signs and weight were obtained and pain assessment was completed.  Allergies and medications were reviewed with the patient.  Review of systems completed.     Vitals/Pain:  Vitals:    02/04/20 0824   BP: 138/65   Pulse: 75   Temp: 36.4 °C (97.5 °F)   SpO2: 99%   Weight: 62.4 kg (137 lb 8 oz)   Height: 1.651 m (5' 5\")   Pain Score: No pain        Allergies:   Patient has no known allergies.    Current Medications:  Current Outpatient Medications   Medication Sig Dispense Refill   • Denosumab (XGEVA SC) Inject  as instructed.     • VERZENIO 150 MG Tab Take 150 mg by mouth 2 Times a Day.     • Fulvestrant (FASLODEX IM) by Intramuscular route.     • atorvastatin (LIPITOR) 80 MG tablet Take 80 mg by mouth every day.     • CALCIUM PO Take 2,000 mg by mouth every day.     • Ivermectin 1 % Cream 1 Application by Apply externally route every day. (Patient not taking: Reported on 8/21/2019) 1 Tube 3     No current facility-administered medications for this encounter.          PCP:  Lico Coleman R.N.  "

## 2020-02-04 NOTE — CONSULTS
RADIATION ONCOLOGY CONSULT    DATE OF SERVICE: 2/4/2020    IDENTIFICATION: A 54 y.o. female with metastatic estrogen positive breast cancer to the brain now with 2 new brain metastasis on Verzenio and letrozole..  She is here at the kind request of Dr. Granado.      HISTORY OF PRESENT ILLNESS: Patient's history dates back to 2016 when she was seen by her optometrist and found to have a right lateral visual field deficit.  She underwent a CT scan 11/14/2016 showing a 2 cm left occipital mass with surrounding edema MRI confirmed this.  It was not felt to be amenable to biopsy.  CT scan chest abdomen pelvis showed a 1.9 cm left breast mass.  Core biopsies of this mass 11/16/2016 showed a infiltrating ductal carcinoma ER 90% UT 2% Ki-67 19% HER-2/moshe 2+ FISH negative.  She received CyberKnife completing therapy 11/29/2000 1627 Gy in 3 fractions.  She then underwent left partial mastectomy and sentinel node dissection 12/19/2016.  Tumor was grade 2, 3.3 cm and negative sentinel nodes.  Oncotype score 22.  Was started on letrozole.  Received radiation therapy to the breast completing therapy 2/9/2017.  Remained DELANEY until was found to have a palpable neck node this was excised 6/28/2018 showing extensive replacement by carcinoma consistent with metastatic breast cancer ER 95% UT negative HER-2/moshe negative by IHC.  Foundation 1 showed no reportable alterations was started on abemaciclib and fulvestrant.  Tumor remained stable on bone scan and CT scans.  Until MRI scan done 1/29/2020 showing 2 new brain metastasis, a 4 to 5 mm enhancing lesion in the right temporal lobe and a 2 mm enhancing lesion in the right parietal occipital region.  Additional tiny lesions could also be present.  She is seen in consultation about the role of radiation therapy to treat these lesions.  She recently had an insurance change as she had previously been treated at [facility].    PAST MEDICAL HISTORY:   Past Medical History:   Diagnosis Date    • Cancer (HCC)     Metastatic breast cancer dx 2016   • Chemotherapy adverse reaction     on oral abemicicilb and faslodex   • High cholesterol    • History of radiation therapy     2016 Left occipital and 2017 left breast  [facility]       PAST SURGICAL HISTORY:  Past Surgical History:   Procedure Laterality Date   • LYMPH NODE EXCISION Left 2018    Procedure: LYMPH NODE EXCISION - CERVICAL NODE BX;  Surgeon: Nataliia Acevedo M.D.;  Location: SURGERY [facility];  Service: General   • LUMPECTOMY Left     2016       GYNECOLOGICAL STATUS:  Menses Start Age: 10   & Para:   : 4, Para: 3 and Number of Interrupted Pregnancies: 1  Menopause Status: Post  Reason For Menopause: Natural  Gynecological comments: BCP use 20+ yrs, HRT - never used        CURRENT MEDICATIONS:  Current Outpatient Medications   Medication Sig Dispense Refill   • Denosumab (XGEVA SC) Inject  as instructed.     • VERZENIO 150 MG Tab Take 150 mg by mouth 2 Times a Day.     • Fulvestrant (FASLODEX IM) by Intramuscular route.     • atorvastatin (LIPITOR) 80 MG tablet Take 80 mg by mouth every day.     • CALCIUM PO Take 2,000 mg by mouth every day.     • Ivermectin 1 % Cream 1 Application by Apply externally route every day. (Patient not taking: Reported on 2019) 1 Tube 3     No current facility-administered medications for this encounter.        ALLERGIES:    Patient has no known allergies.    FAMILY HISTORY:    Family History   Problem Relation Age of Onset   • Cancer Father         Prostate cancer   [unfilled]        SOCIAL HISTORY:     reports that she has never smoked. She has never used smokeless tobacco. She reports that she does not drink alcohol or use drugs.   Patient is , has 3 children and lives in [place], [place].  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      REVIEW OF SYSTEMS: Pertinent positives consist of abdominal pain and diarrhea because of the Verzenio she gets occasional nocturia and  "occasional neck pain in the excisional biopsy site in her left neck.  The rest of the review of systems is negative and has been reviewed by me. It is in the nursing note dated 2/4/2020 in [facility]    Pain Scale: 0-10  Pain Assessement: 0/10  Pain Location, Orientation and Scale: no c/o pain, no c/o headaches     PHYSICAL EXAM:    0= Fully active, able to carry on all pre-disease performance without restriction.  Vitals:    02/04/20 0824   BP: 138/65   Pulse: 75   Temp: 36.4 °C (97.5 °F)   SpO2: 99%   Weight: 62.4 kg (137 lb 8 oz)   Height: 1.651 m (5' 5\")   Pain Score: No pain        GENERAL: Well-appearing alert and oriented x3 in no apparent distress  Breasts: Bilaterally symmetrical no masses are appreciated in either breast or axilla.  HEENT:  Pupils are equal, round, and reactive to light.  Extraocular muscles   are intact. Sclerae nonicteric.  Conjunctivae pink.  Oral cavity, tongue   protrudes midline.   NECK:   No peripheral adenopathy of the neck, supraclavicular fossa or axillae   bilaterally.  Evidence of the left neck biopsy  LUNGS:  Clear to ascultation   HEART:  Regular rate and rhythm.  No murmur appreciated  ABDOMEN:  Soft. No evidence of hepatosplenomegaly.    EXTREMITIES:  Without Edema.  NEUROLOGIC:  Cranial nerves II through XII were intact. Normal stance and gait motor and sensory grossly within normal limits                IMPRESSION:    A 54 y.o. with metastatic estrogen positive breast cancer to the brain with 2 new lesions despite being on Verzenio and letrozole..      RECOMMENDATIONS:   I discussed with the patient that she would be a candidate for stereotactic radiosurgery to treat these 2 lesions.  They are currently asymptomatic.  I told her that I would like to get a stereotactic radiosurgery scan first.  There could possibly be more lesions possibly making her not a candidate for stereotactic radiosurgery.  In any event we are going to proceed forward with the MRI scan and subsequent " simulation.  I've described the details of radiation along with the side effects both acute and chronic, including but not exclusive to fatigue, skin reaction, and neurocognitive damage.  Ample time was allowed for questions, and patient understands.    We will schedule her for a 3T SRS MRI scan in the next week followed by simulation to get started with treatment soon thereafter.    Thank you for the opportunity to participate in her care.  If any questions or comments, please do not hesitate in calling.    Please note that this dictation was created using voice recognition software. I have made every reasonable attempt to correct obvious errors, but I expect that there are errors of grammar and possibly content that I did not discover before finalizing the note.

## 2020-02-11 ENCOUNTER — APPOINTMENT (OUTPATIENT)
Dept: RADIOLOGY | Facility: MEDICAL CENTER | Age: 55
End: 2020-02-11
Attending: RADIOLOGY
Payer: COMMERCIAL

## 2020-02-11 DIAGNOSIS — C79.31 BREAST CANCER METASTASIZED TO BRAIN, LEFT (HCC): ICD-10-CM

## 2020-02-11 DIAGNOSIS — C50.912 BREAST CANCER METASTASIZED TO BRAIN, LEFT (HCC): ICD-10-CM

## 2020-02-11 PROCEDURE — 700117 HCHG RX CONTRAST REV CODE 255: Performed by: RADIOLOGY

## 2020-02-11 PROCEDURE — 70553 MRI BRAIN STEM W/O & W/DYE: CPT

## 2020-02-11 PROCEDURE — A9576 INJ PROHANCE MULTIPACK: HCPCS | Performed by: RADIOLOGY

## 2020-02-11 RX ADMIN — GADOTERIDOL 12 ML: 279.3 INJECTION, SOLUTION INTRAVENOUS at 10:20

## 2020-02-12 ENCOUNTER — HOSPITAL ENCOUNTER (OUTPATIENT)
Dept: RADIATION ONCOLOGY | Facility: MEDICAL CENTER | Age: 55
End: 2020-02-12
Payer: COMMERCIAL

## 2020-02-12 PROCEDURE — 77334 RADIATION TREATMENT AID(S): CPT | Performed by: RADIOLOGY

## 2020-02-12 PROCEDURE — 77263 THER RADIOLOGY TX PLNG CPLX: CPT | Performed by: RADIOLOGY

## 2020-02-12 PROCEDURE — 77290 THER RAD SIMULAJ FIELD CPLX: CPT | Mod: 26 | Performed by: RADIOLOGY

## 2020-02-12 PROCEDURE — 77334 RADIATION TREATMENT AID(S): CPT | Mod: 26 | Performed by: RADIOLOGY

## 2020-02-12 PROCEDURE — 77290 THER RAD SIMULAJ FIELD CPLX: CPT | Performed by: RADIOLOGY

## 2020-02-12 PROCEDURE — 77470 SPECIAL RADIATION TREATMENT: CPT | Performed by: RADIOLOGY

## 2020-02-19 PROCEDURE — 77300 RADIATION THERAPY DOSE PLAN: CPT | Mod: 26 | Performed by: RADIOLOGY

## 2020-02-19 PROCEDURE — 77295 3-D RADIOTHERAPY PLAN: CPT | Mod: 26 | Performed by: RADIOLOGY

## 2020-02-19 PROCEDURE — 77334 RADIATION TREATMENT AID(S): CPT | Mod: 26 | Performed by: RADIOLOGY

## 2020-02-19 PROCEDURE — 77334 RADIATION TREATMENT AID(S): CPT | Performed by: RADIOLOGY

## 2020-02-19 PROCEDURE — 77300 RADIATION THERAPY DOSE PLAN: CPT | Performed by: RADIOLOGY

## 2020-02-19 PROCEDURE — 77295 3-D RADIOTHERAPY PLAN: CPT | Performed by: RADIOLOGY

## 2020-02-20 ENCOUNTER — HOSPITAL ENCOUNTER (OUTPATIENT)
Dept: RADIATION ONCOLOGY | Facility: MEDICAL CENTER | Age: 55
End: 2020-02-20
Payer: COMMERCIAL

## 2020-02-20 LAB
CHEMOTHERAPY INFUSION START DATE: NORMAL
CHEMOTHERAPY INFUSION START DATE: NORMAL
CHEMOTHERAPY RECORDS: 20
CHEMOTHERAPY RECORDS: 20
CHEMOTHERAPY RECORDS: 2000
CHEMOTHERAPY RECORDS: 2000
CHEMOTHERAPY RECORDS: 2500
CHEMOTHERAPY RECORDS: 5
CHEMOTHERAPY RECORDS: NORMAL
CHEMOTHERAPY RX CANCER: NORMAL
CHEMOTHERAPY RX CANCER: NORMAL
DATE 1ST CHEMO CANCER: NORMAL
DATE 1ST CHEMO CANCER: NORMAL
RAD ONC ARIA COURSE LAST TREATMENT DATE: NORMAL
RAD ONC ARIA COURSE LAST TREATMENT DATE: NORMAL
RAD ONC ARIA COURSE TREATMENT ELAPSED DAYS: NORMAL
RAD ONC ARIA COURSE TREATMENT ELAPSED DAYS: NORMAL
RAD ONC ARIA REFERENCE POINT DOSAGE GIVEN TO DATE: 20
RAD ONC ARIA REFERENCE POINT DOSAGE GIVEN TO DATE: 20
RAD ONC ARIA REFERENCE POINT DOSAGE GIVEN TO DATE: 24.03
RAD ONC ARIA REFERENCE POINT DOSAGE GIVEN TO DATE: 24.69
RAD ONC ARIA REFERENCE POINT DOSAGE GIVEN TO DATE: 5
RAD ONC ARIA REFERENCE POINT DOSAGE GIVEN TO DATE: 5.75
RAD ONC ARIA REFERENCE POINT ID: NORMAL
RAD ONC ARIA REFERENCE POINT SESSION DOSAGE GIVEN: 20
RAD ONC ARIA REFERENCE POINT SESSION DOSAGE GIVEN: 20
RAD ONC ARIA REFERENCE POINT SESSION DOSAGE GIVEN: 24.03
RAD ONC ARIA REFERENCE POINT SESSION DOSAGE GIVEN: 24.69
RAD ONC ARIA REFERENCE POINT SESSION DOSAGE GIVEN: 5
RAD ONC ARIA REFERENCE POINT SESSION DOSAGE GIVEN: 5.75

## 2020-02-20 PROCEDURE — 77280 THER RAD SIMULAJ FIELD SMPL: CPT | Mod: 26 | Performed by: RADIOLOGY

## 2020-02-20 PROCEDURE — 77370 RADIATION PHYSICS CONSULT: CPT | Performed by: RADIOLOGY

## 2020-02-20 PROCEDURE — 77373 STRTCTC BDY RAD THER TX DLVR: CPT | Performed by: RADIOLOGY

## 2020-02-20 PROCEDURE — 77280 THER RAD SIMULAJ FIELD SMPL: CPT | Performed by: RADIOLOGY

## 2020-02-21 ENCOUNTER — HOSPITAL ENCOUNTER (OUTPATIENT)
Dept: RADIATION ONCOLOGY | Facility: MEDICAL CENTER | Age: 55
End: 2020-02-21
Payer: COMMERCIAL

## 2020-02-21 LAB
CHEMOTHERAPY INFUSION START DATE: NORMAL
CHEMOTHERAPY RECORDS: 2500
CHEMOTHERAPY RECORDS: 5
CHEMOTHERAPY RECORDS: NORMAL
CHEMOTHERAPY RX CANCER: NORMAL
DATE 1ST CHEMO CANCER: NORMAL
RAD ONC ARIA COURSE LAST TREATMENT DATE: NORMAL
RAD ONC ARIA COURSE TREATMENT ELAPSED DAYS: NORMAL
RAD ONC ARIA REFERENCE POINT DOSAGE GIVEN TO DATE: 10
RAD ONC ARIA REFERENCE POINT DOSAGE GIVEN TO DATE: 11.49
RAD ONC ARIA REFERENCE POINT ID: NORMAL
RAD ONC ARIA REFERENCE POINT ID: NORMAL
RAD ONC ARIA REFERENCE POINT SESSION DOSAGE GIVEN: 5
RAD ONC ARIA REFERENCE POINT SESSION DOSAGE GIVEN: 5.75

## 2020-02-21 PROCEDURE — 77280 THER RAD SIMULAJ FIELD SMPL: CPT | Performed by: RADIOLOGY

## 2020-02-21 PROCEDURE — 77373 STRTCTC BDY RAD THER TX DLVR: CPT | Performed by: RADIOLOGY

## 2020-02-21 PROCEDURE — 77280 THER RAD SIMULAJ FIELD SMPL: CPT | Mod: 26 | Performed by: RADIOLOGY

## 2020-02-24 ENCOUNTER — HOSPITAL ENCOUNTER (OUTPATIENT)
Dept: RADIATION ONCOLOGY | Facility: MEDICAL CENTER | Age: 55
End: 2020-02-24
Payer: COMMERCIAL

## 2020-02-24 LAB
CHEMOTHERAPY INFUSION START DATE: NORMAL
CHEMOTHERAPY RECORDS: 2500
CHEMOTHERAPY RECORDS: 5
CHEMOTHERAPY RECORDS: NORMAL
CHEMOTHERAPY RX CANCER: NORMAL
DATE 1ST CHEMO CANCER: NORMAL
RAD ONC ARIA COURSE LAST TREATMENT DATE: NORMAL
RAD ONC ARIA COURSE TREATMENT ELAPSED DAYS: NORMAL
RAD ONC ARIA REFERENCE POINT DOSAGE GIVEN TO DATE: 15
RAD ONC ARIA REFERENCE POINT DOSAGE GIVEN TO DATE: 17.24
RAD ONC ARIA REFERENCE POINT ID: NORMAL
RAD ONC ARIA REFERENCE POINT ID: NORMAL
RAD ONC ARIA REFERENCE POINT SESSION DOSAGE GIVEN: 5
RAD ONC ARIA REFERENCE POINT SESSION DOSAGE GIVEN: 5.75

## 2020-02-24 PROCEDURE — 77280 THER RAD SIMULAJ FIELD SMPL: CPT | Mod: 26 | Performed by: RADIOLOGY

## 2020-02-24 PROCEDURE — 77336 RADIATION PHYSICS CONSULT: CPT | Performed by: RADIOLOGY

## 2020-02-24 PROCEDURE — 77280 THER RAD SIMULAJ FIELD SMPL: CPT | Performed by: RADIOLOGY

## 2020-02-24 PROCEDURE — 77373 STRTCTC BDY RAD THER TX DLVR: CPT | Performed by: RADIOLOGY

## 2020-02-25 ENCOUNTER — HOSPITAL ENCOUNTER (OUTPATIENT)
Dept: RADIATION ONCOLOGY | Facility: MEDICAL CENTER | Age: 55
End: 2020-02-25
Payer: COMMERCIAL

## 2020-02-25 LAB
CHEMOTHERAPY INFUSION START DATE: NORMAL
CHEMOTHERAPY RECORDS: 2500
CHEMOTHERAPY RECORDS: 5
CHEMOTHERAPY RECORDS: NORMAL
CHEMOTHERAPY RX CANCER: NORMAL
DATE 1ST CHEMO CANCER: NORMAL
RAD ONC ARIA COURSE LAST TREATMENT DATE: NORMAL
RAD ONC ARIA COURSE TREATMENT ELAPSED DAYS: NORMAL
RAD ONC ARIA REFERENCE POINT DOSAGE GIVEN TO DATE: 20
RAD ONC ARIA REFERENCE POINT DOSAGE GIVEN TO DATE: 22.98
RAD ONC ARIA REFERENCE POINT ID: NORMAL
RAD ONC ARIA REFERENCE POINT ID: NORMAL
RAD ONC ARIA REFERENCE POINT SESSION DOSAGE GIVEN: 5
RAD ONC ARIA REFERENCE POINT SESSION DOSAGE GIVEN: 5.75

## 2020-02-25 PROCEDURE — 77373 STRTCTC BDY RAD THER TX DLVR: CPT | Performed by: RADIOLOGY

## 2020-02-25 PROCEDURE — 77280 THER RAD SIMULAJ FIELD SMPL: CPT | Performed by: RADIOLOGY

## 2020-02-25 PROCEDURE — 77280 THER RAD SIMULAJ FIELD SMPL: CPT | Mod: 26 | Performed by: RADIOLOGY

## 2020-02-26 ENCOUNTER — HOSPITAL ENCOUNTER (OUTPATIENT)
Dept: RADIATION ONCOLOGY | Facility: MEDICAL CENTER | Age: 55
End: 2020-02-26
Payer: COMMERCIAL

## 2020-02-26 VITALS
TEMPERATURE: 97.5 F | HEART RATE: 66 BPM | OXYGEN SATURATION: 97 % | WEIGHT: 140.8 LBS | BODY MASS INDEX: 23.43 KG/M2 | SYSTOLIC BLOOD PRESSURE: 134 MMHG | DIASTOLIC BLOOD PRESSURE: 79 MMHG

## 2020-02-26 DIAGNOSIS — C50.912 BREAST CANCER METASTASIZED TO BRAIN, LEFT (HCC): ICD-10-CM

## 2020-02-26 DIAGNOSIS — C79.31 BREAST CANCER METASTASIZED TO BRAIN, LEFT (HCC): ICD-10-CM

## 2020-02-26 LAB
CHEMOTHERAPY INFUSION START DATE: NORMAL
CHEMOTHERAPY RECORDS: 2500
CHEMOTHERAPY RECORDS: 5
CHEMOTHERAPY RECORDS: NORMAL
CHEMOTHERAPY RX CANCER: NORMAL
DATE 1ST CHEMO CANCER: NORMAL
RAD ONC ARIA COURSE LAST TREATMENT DATE: NORMAL
RAD ONC ARIA COURSE TREATMENT ELAPSED DAYS: NORMAL
RAD ONC ARIA REFERENCE POINT DOSAGE GIVEN TO DATE: 25
RAD ONC ARIA REFERENCE POINT DOSAGE GIVEN TO DATE: 28.73
RAD ONC ARIA REFERENCE POINT ID: NORMAL
RAD ONC ARIA REFERENCE POINT ID: NORMAL
RAD ONC ARIA REFERENCE POINT SESSION DOSAGE GIVEN: 5
RAD ONC ARIA REFERENCE POINT SESSION DOSAGE GIVEN: 5.75

## 2020-02-26 PROCEDURE — 77280 THER RAD SIMULAJ FIELD SMPL: CPT | Mod: 26 | Performed by: RADIOLOGY

## 2020-02-26 PROCEDURE — 77373 STRTCTC BDY RAD THER TX DLVR: CPT | Performed by: RADIOLOGY

## 2020-02-26 PROCEDURE — 77280 THER RAD SIMULAJ FIELD SMPL: CPT | Performed by: RADIOLOGY

## 2020-02-26 ASSESSMENT — PAIN SCALES - GENERAL: PAINLEVEL: NO PAIN

## 2020-02-27 NOTE — ON TREATMENT VISIT
ON TREATMENT  NOTE  RADIATION ONCOLOGY DEPARTMENT    Patient name:  Michelle Diana    Primary Physician:  Bee Barfield M.D. MRN: [id number]  [facility]: [id number]   Referring physician:  Mi Granado M.D. : 1965, 54 y.o.     ENCOUNTER DATE:  20    DIAGNOSIS:    Breast cancer metastasized to brain, left (HCC)  Staging form: BREAST AJCC V7  - Pathologic: Stage IV (T2, N0, M1) - Signed by Merlene Darnell M.D. on 2/3/2020  Laterality: Left  Method of detection of distant metastases: Radiographic  Biopsy of metastatic site performed: No  Source of metastatic specimen: Brain/CNS  Histologic grade (G): G2  Estrogen receptor status: Positive  Progesterone receptor status: Negative  HER2 status: Negative      TREATMENT SUMMARY:  [facility] Treatment Information        Some values may be hidden. Unless noted otherwise, only the newest values recorded on each date are displayed.         [facility] Treatment Summary 20   Course First Treatment Date 2020   Course Last Treatment Date 2020,, SRS Plan from Course C1_brain   L Rodolfo SRT Plan from Course C1_brain   Fraction 4 of 5   Elapsed Course Days 5 @ [id number]   Prescribed Fraction Dose 500 cGy   Prescribed Total Dose 2,500 cGy   R Temp SRS Plan from Course C1_brain   ,, SRS Reference Point from Course C1_brain   ,, SRS CP Reference Point from Course C1_brain   L Rodolfo SRT Reference Point from Course C1_brain   Elapsed Course Days 5 @    Session Dose 500 cGy   Total Dose 2,000 cGy   L Rodolfo SRT CP Reference Point from Course C1_brain   Elapsed Course Days 5 @    Session Dose 575 cGy   Total Dose 2,298 cGy   R Temp SRS Reference Point from Course C1_brain   R Temp SRS CP Reference Point from Course C1_brain             SUBJECTIVE:  Tolerating therapy without event complete today        VITAL SIGNS:  KPS: 100, Fully active, able to carry on all pre-disease performed without restriction (ECOG equivalent  0)  Encounter Vitals  Temperature: 36.4 °C (97.5 °F)  Blood Pressure: 134/79  Pulse: 66  Pulse Oximetry: 97 %  Weight: 63.9 kg (140 lb 12.8 oz)  Pain Score: No pain  Pain Assessment 2/26/2020 2/4/2020   Pain Assessment - Denies Pain   Pain Score 0 0   Some recent data might be hidden          PHYSICAL EXAM:  No change      No flowsheet data found.        IMPRESSION:  Cancer Staging  Breast cancer metastasized to brain, left (HCC)  Staging form: BREAST AJCC V7  - Pathologic: Stage IV (T2, N0, M1) - Signed by Merlene Darnell M.D. on 2/3/2020      PLAN:  No change in treatment plan.  Complete today ordered stereotactic radiosurgery MRI scan for 2 months with follow-up at that time I will see her sooner as needed.    Disposition:  Treatment plan reviewed. Questions answered. Continue therapy outlined.     Merlene Darnell M.D.    No orders of the defined types were placed in this encounter.

## 2020-02-28 ENCOUNTER — PATIENT OUTREACH (OUTPATIENT)
Dept: OTHER | Facility: MEDICAL CENTER | Age: 55
End: 2020-02-28

## 2020-03-03 ENCOUNTER — HOSPITAL ENCOUNTER (OUTPATIENT)
Dept: LAB | Facility: MEDICAL CENTER | Age: 55
End: 2020-03-03
Attending: INTERNAL MEDICINE
Payer: COMMERCIAL

## 2020-03-03 LAB
MAGNESIUM SERPL-MCNC: 2.1 MG/DL (ref 1.5–2.5)
PHOSPHATE SERPL-MCNC: 3.2 MG/DL (ref 2.5–4.5)

## 2020-03-03 PROCEDURE — 84100 ASSAY OF PHOSPHORUS: CPT

## 2020-03-03 PROCEDURE — 83735 ASSAY OF MAGNESIUM: CPT

## 2020-04-24 ENCOUNTER — HOSPITAL ENCOUNTER (OUTPATIENT)
Dept: RADIOLOGY | Facility: MEDICAL CENTER | Age: 55
End: 2020-04-24
Attending: RADIOLOGY
Payer: COMMERCIAL

## 2020-04-24 DIAGNOSIS — C79.31 BREAST CANCER METASTASIZED TO BRAIN, LEFT (HCC): ICD-10-CM

## 2020-04-24 DIAGNOSIS — C50.912 BREAST CANCER METASTASIZED TO BRAIN, LEFT (HCC): ICD-10-CM

## 2020-04-24 PROCEDURE — 70553 MRI BRAIN STEM W/O & W/DYE: CPT

## 2020-04-24 PROCEDURE — A9576 INJ PROHANCE MULTIPACK: HCPCS | Performed by: RADIOLOGY

## 2020-04-24 PROCEDURE — 700117 HCHG RX CONTRAST REV CODE 255: Performed by: RADIOLOGY

## 2020-04-24 RX ADMIN — GADOTERIDOL 15 ML: 279.3 INJECTION, SOLUTION INTRAVENOUS at 09:36

## 2020-04-28 ENCOUNTER — HOSPITAL ENCOUNTER (OUTPATIENT)
Dept: RADIATION ONCOLOGY | Facility: MEDICAL CENTER | Age: 55
End: 2020-04-30
Attending: RADIOLOGY
Payer: COMMERCIAL

## 2020-04-28 VITALS
BODY MASS INDEX: 24.01 KG/M2 | HEIGHT: 65 IN | WEIGHT: 144.1 LBS | DIASTOLIC BLOOD PRESSURE: 65 MMHG | OXYGEN SATURATION: 98 % | HEART RATE: 60 BPM | SYSTOLIC BLOOD PRESSURE: 126 MMHG | TEMPERATURE: 97.2 F

## 2020-04-28 DIAGNOSIS — C79.31 BREAST CANCER METASTASIZED TO BRAIN, LEFT (HCC): ICD-10-CM

## 2020-04-28 DIAGNOSIS — C50.912 BREAST CANCER METASTASIZED TO BRAIN, LEFT (HCC): ICD-10-CM

## 2020-04-28 PROCEDURE — 99212 OFFICE O/P EST SF 10 MIN: CPT | Performed by: RADIOLOGY

## 2020-04-28 ASSESSMENT — FIBROSIS 4 INDEX: FIB4 SCORE: 1.05

## 2020-04-28 ASSESSMENT — PAIN SCALES - GENERAL: PAINLEVEL: NO PAIN

## 2020-04-28 NOTE — NON-PROVIDER
"Patient was seen today in clinic with Dr. Darnell for follow up.  Vitals signs and weight were obtained and pain assessment was completed.  Allergies and medications were reviewed with the patient.  Review of systems completed.     Vitals/Pain:  Vitals:    04/28/20 0933   BP: 126/65   BP Location: Right arm   Patient Position: Sitting   BP Cuff Size: Adult   Pulse: 60   Temp: 36.2 °C (97.2 °F)   TempSrc: Temporal   SpO2: 98%   Weight: 65.4 kg (144 lb 1.6 oz)   Height: 1.651 m (5' 5\")   Pain Score: No pain        Allergies:   Patient has no known allergies.    Current Medications:  Current Outpatient Medications   Medication Sig Dispense Refill   • Denosumab (XGEVA SC) Inject  as instructed.     • VERZENIO 150 MG Tab Take 150 mg by mouth 2 Times a Day.     • Fulvestrant (FASLODEX IM) by Intramuscular route.     • atorvastatin (LIPITOR) 80 MG tablet Take 80 mg by mouth every day.     • CALCIUM PO Take 2,000 mg by mouth every day.     • Ivermectin 1 % Cream 1 Application by Apply externally route every day. (Patient not taking: Reported on 8/21/2019) 1 Tube 3     No current facility-administered medications for this encounter.          PCP:  Lico Meza, Med Ass't    " continue nocturnal (and PRN) bipap

## 2020-04-28 NOTE — PROGRESS NOTES
RADIATION ONCOLOGY FOLLOW-UP    DATE OF SERVICE: 4/28/2020    IDENTIFICATION:   A 54 y.o. 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  She had previously been treated to a left occipital lesion following resection and stereotactic radiotherapy with CyberKnife in 2017.  Currently on Verzenio and letrozole.     HISTORY OF PRESENT ILLNESS:   Patient's history dates back to 2016 when she  was seen by her optometrist and found to have a right lateral visual field  deficit. She underwent a CT scan 11/14/2016 showing a 2 cm left occipital  mass with surrounding edema MRI confirmed this. It was not felt to be  amenable to biopsy. CT scan chest abdomen pelvis showed a 1.9 cm left  breast mass. Core biopsies of this mass 11/16/2016 showed a infiltrating  ductal carcinoma ER 90% WA 2% Ki-67 19% HER-2/moshe 2+ FISH negative. She  received CyberKnife completing therapy 11/29/2000 2700 Gy in 3 fractions.  She then underwent left partial mastectomy and sentinel node dissection  12/19/2016. Tumor was grade 2, 3.3 cm and negative sentinel nodes.  Oncotype score 22. Was started on letrozole. Received radiation therapy  to the breast completing therapy 2/9/2017. Remained DELANEY until was found  to have a palpable neck node this was excised 6/28/2018 showing extensive  replacement by carcinoma consistent with metastatic breast cancer ER 95%  WA negative HER-2/moshe negative by IHC. Foundation 1 showed no reportable  alterations was started on abemaciclib and fulvestrant. Tumor remained  stable on bone scan and CT scans. Until MRI scan done 1/29/2020 showing 2  new brain metastasis, a 4 to 5 mm enhancing lesion in the right temporal  lobe and a 2 mm enhancing lesion in the right parietal occipital region.  Follow-up SRS MRI scan by me showed  "a total of 5 lesions 1 in the left mary.  She is here today to review the results of the most recent MRI scan following stereotactic radiosurgery.  Currently she is doing well without any major complaints.  She did have a little bit of right lateral visual loss that responded to a short course of steroids after radiation.    CURRENT MEDICATIONS:  Current Outpatient Medications   Medication Sig Dispense Refill   • Denosumab (XGEVA SC) Inject  as instructed.     • VERZENIO 150 MG Tab Take 150 mg by mouth 2 Times a Day.     • Fulvestrant (FASLODEX IM) by Intramuscular route.     • atorvastatin (LIPITOR) 80 MG tablet Take 80 mg by mouth every day.     • CALCIUM PO Take 2,000 mg by mouth every day.     • Ivermectin 1 % Cream 1 Application by Apply externally route every day. (Patient not taking: Reported on 8/21/2019) 1 Tube 3     No current facility-administered medications for this encounter.        ALLERGIES:  Patient has no known allergies.    FAMILY HISTORY:    Family History   Problem Relation Age of Onset   • Cancer Father         Prostate cancer/colon cancer   [unfilled]        SOCIAL HISTORY:     reports that she has never smoked. She has never used smokeless tobacco. She reports that she does not drink alcohol or use drugs.      REVIEW OF SYSTEMS: Is significant for the blurred vision in the right eye she plans to see her optometrist in the next month.  She has diarrhea from her systemic breast treatment..  The rest of the review of systems has been reviewed by me and is documented in the nursing note in [facility] dated 4/28/2020    PHYSICAL EXAM:     ECOG PERFORMANCE STATUS:  0= Fully active, able to carry on all pre-disease performance without restriction.       Vitals:    04/28/20 0933   BP: 126/65   BP Location: Right arm   Patient Position: Sitting   BP Cuff Size: Adult   Pulse: 60   Temp: 36.2 °C (97.2 °F)   TempSrc: Temporal   SpO2: 98%   Weight: 65.4 kg (144 lb 1.6 oz)   Height: 1.651 m (5' 5\")   Pain " Score: No pain        GENERAL: Well-appearing alert and oriented x3 in no apparent distress  HEENT:  Pupils are equal, round, and reactive to light.  Extraocular muscles   are intact. Sclerae nonicteric.  Conjunctivae pink.  Oral cavity, tongue   protrudes midline.   NECK:  Supple without evidence of thyromegaly.  NODES:  No peripheral adenopathy of the neck, supraclavicular fossa or axillae   bilaterally.  LUNGS:  Clear to ascultation   HEART:  Regular rate and rhythm.  No murmur appreciated  ABDOMEN:  Soft. No evidence of hepatosplenomegaly.  Positive bowel sounds.  EXTREMITIES:  Without Edema.  NEUROLOGIC:  Cranial nerves II through XII were intact. Normal stance and gait motor and sensory grossly within normal limits          IMPRESSION:    A 54 y.o. female metastatic estrogen positive breast cancer to the brain now with 5 new brain metastasis status post SRS complete 2/26/2022 a left mary lesion, right and left parietal occipital lesions, right temporal lesion, and right occipital lesion.  She had previously been treated to a left occipital lesion following resection and stereotactic radiotherapy with CyberKnife in 2017.  Currently on Verzenio and letrozole.        RECOMMENDATIONS:   Patient is doing well without any side effects.  I am going to have her get an MRI scan in 3 months with follow-up at that time.  I am happy to see her sooner as needed.    She will follow with Dr. Granado for her systemic treatment.      Thank you for the opportunity to participate in her care.  If any questions or comments, please do not hesitate in calling.      Please note that this dictation was created using voice recognition software. I have made every reasonable attempt to correct obvious errors, but I expect that there are errors of grammar and possibly content that I did not discover before finalizing the note.

## 2020-06-12 ENCOUNTER — HOSPITAL ENCOUNTER (OUTPATIENT)
Dept: RADIOLOGY | Facility: MEDICAL CENTER | Age: 55
End: 2020-06-12
Attending: INTERNAL MEDICINE
Payer: COMMERCIAL

## 2020-06-12 DIAGNOSIS — C50.412 MALIGNANT NEOPLASM OF UPPER-OUTER QUADRANT OF LEFT FEMALE BREAST (HCC): ICD-10-CM

## 2020-06-12 PROCEDURE — A9503 TC99M MEDRONATE: HCPCS

## 2020-06-12 PROCEDURE — 700117 HCHG RX CONTRAST REV CODE 255: Performed by: INTERNAL MEDICINE

## 2020-06-12 PROCEDURE — 71260 CT THORAX DX C+: CPT

## 2020-06-12 RX ADMIN — IOHEXOL 100 ML: 350 INJECTION, SOLUTION INTRAVENOUS at 14:15

## 2020-06-12 RX ADMIN — IOHEXOL 25 ML: 240 INJECTION, SOLUTION INTRATHECAL; INTRAVASCULAR; INTRAVENOUS; ORAL at 14:15

## 2020-06-25 ENCOUNTER — HOSPITAL ENCOUNTER (OUTPATIENT)
Dept: LAB | Facility: MEDICAL CENTER | Age: 55
End: 2020-06-25
Attending: INTERNAL MEDICINE
Payer: COMMERCIAL

## 2020-06-25 PROCEDURE — 84100 ASSAY OF PHOSPHORUS: CPT

## 2020-06-25 PROCEDURE — 86300 IMMUNOASSAY TUMOR CA 15-3: CPT

## 2020-06-25 PROCEDURE — 83735 ASSAY OF MAGNESIUM: CPT

## 2020-06-26 LAB
MAGNESIUM SERPL-MCNC: 2 MG/DL (ref 1.5–2.5)
PHOSPHATE SERPL-MCNC: 3.9 MG/DL (ref 2.5–4.5)

## 2020-06-28 LAB — CANCER AG27-29 SERPL-ACNC: 19.8 U/ML (ref 0–40)

## 2020-07-28 ENCOUNTER — HOSPITAL ENCOUNTER (OUTPATIENT)
Dept: RADIOLOGY | Facility: MEDICAL CENTER | Age: 55
End: 2020-07-28
Attending: RADIOLOGY
Payer: COMMERCIAL

## 2020-07-28 DIAGNOSIS — C50.912 BREAST CANCER METASTASIZED TO BRAIN, LEFT (HCC): ICD-10-CM

## 2020-07-28 DIAGNOSIS — C79.31 BREAST CANCER METASTASIZED TO BRAIN, LEFT (HCC): ICD-10-CM

## 2020-07-28 PROCEDURE — A9576 INJ PROHANCE MULTIPACK: HCPCS | Performed by: RADIOLOGY

## 2020-07-28 PROCEDURE — 700117 HCHG RX CONTRAST REV CODE 255: Performed by: RADIOLOGY

## 2020-07-28 PROCEDURE — 70553 MRI BRAIN STEM W/O & W/DYE: CPT

## 2020-07-28 RX ADMIN — GADOTERIDOL 15 ML: 279.3 INJECTION, SOLUTION INTRAVENOUS at 10:15

## 2020-08-03 ENCOUNTER — HOSPITAL ENCOUNTER (OUTPATIENT)
Dept: RADIATION ONCOLOGY | Facility: MEDICAL CENTER | Age: 55
End: 2020-08-31
Attending: RADIOLOGY
Payer: COMMERCIAL

## 2020-08-03 VITALS
HEART RATE: 58 BPM | SYSTOLIC BLOOD PRESSURE: 118 MMHG | BODY MASS INDEX: 22.96 KG/M2 | WEIGHT: 138 LBS | OXYGEN SATURATION: 98 % | DIASTOLIC BLOOD PRESSURE: 71 MMHG

## 2020-08-03 PROCEDURE — 99215 OFFICE O/P EST HI 40 MIN: CPT | Performed by: RADIOLOGY

## 2020-08-03 PROCEDURE — 99212 OFFICE O/P EST SF 10 MIN: CPT | Performed by: RADIOLOGY

## 2020-08-03 ASSESSMENT — PAIN SCALES - GENERAL: PAINLEVEL: NO PAIN

## 2020-08-03 ASSESSMENT — FIBROSIS 4 INDEX: FIB4 SCORE: 1.05

## 2020-08-03 NOTE — PROGRESS NOTES
RADIATION ONCOLOGY FOLLOW-UP    DATE OF SERVICE: 8/3/2020    IDENTIFICATION:   A 54 y.o. 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  She had previously been treated to a left occipital lesion following resection and stereotactic radiotherapy with CyberKnife in 2017.  Currently on Verzenio and letrozole.Here to review the results of her recent MRI scan.    HISTORY OF PRESENT ILLNESS:   Patient's history dates back to 2016 when she  was seen by her optometrist and found to have a right lateral visual field  deficit. She underwent a CT scan 11/14/2016 showing a 2 cm left occipital  mass with surrounding edema MRI confirmed this. It was not felt to be  amenable to biopsy. CT scan chest abdomen pelvis showed a 1.9 cm left  breast mass. Core biopsies of this mass 11/16/2016 showed a infiltrating  ductal carcinoma ER 90% ND 2% Ki-67 19% HER-2/moshe 2+ FISH negative. She  received CyberKnife completing therapy 11/29/2000 2700 Gy in 3 fractions.  She then underwent left partial mastectomy and sentinel node dissection  12/19/2016. Tumor was grade 2, 3.3 cm and negative sentinel nodes.  Oncotype score 22. Was started on letrozole. Received radiation therapy  to the breast completing therapy 2/9/2017. Remained DELANEY until was found  to have a palpable neck node this was excised 6/28/2018 showing extensive  replacement by carcinoma consistent with metastatic breast cancer ER 95%  ND negative HER-2/moshe negative by IHC. Foundation 1 showed no reportable  alterations was started on abemaciclib and fulvestrant. Tumor remained  stable on bone scan and CT scans. Until MRI scan done 1/29/2020 showing 2  new brain metastasis, a 4 to 5 mm enhancing lesion in the right temporal  lobe and a 2 mm enhancing lesion in the right parietal  occipital region.  Follow-up SRS MRI scan by me showed a total of 5 lesions 1 in the left mary.  She is here today to review the results of the most recent MRI scan following  stereotactic radiosurgery. Currently she is doing well without any new complaints.  Unfortunately the MRI scan of the brain showed 6 new lesions for in the cerebellum 1 in the left occipital region and 1 in the left cerebral peduncle. At this point she is asymptomatic.    CURRENT MEDICATIONS:  Current Outpatient Medications   Medication Sig Dispense Refill   • Denosumab (XGEVA SC) Inject  as instructed.     • Ivermectin 1 % Cream 1 Application by Apply externally route every day. (Patient not taking: Reported on 8/21/2019) 1 Tube 3   • VERZENIO 150 MG Tab Take 150 mg by mouth 2 Times a Day.     • Fulvestrant (FASLODEX IM) by Intramuscular route.     • atorvastatin (LIPITOR) 80 MG tablet Take 80 mg by mouth every day.     • CALCIUM PO Take 2,000 mg by mouth every day.       No current facility-administered medications for this encounter.        ALLERGIES:  Patient has no known allergies.    FAMILY HISTORY:    Family History   Problem Relation Age of Onset   • Cancer Father         Prostate cancer/colon cancer   [unfilled]        SOCIAL HISTORY:     reports that she has never smoked. She has never used smokeless tobacco. She reports that she does not drink alcohol or use drugs.      REVIEW OF SYSTEMS: Is significant for History of C. difficile but currently under control since last seen.History of abdominal pain and diarrhea because of this.  The rest of the review of systems has been reviewed by me and is documented in the nursing note in [facility] dated 8/3/2020    PHYSICAL EXAM:     ECOG PERFORMANCE STATUS:  0= Fully active, able to carry on all pre-disease performance without restriction.       Vitals:    08/03/20 1348   BP: 118/71   Pulse: (!) 58   SpO2: 98%   Weight: 62.6 kg (138 lb)   Pain Score: No pain        GENERAL: Well-appearing alert  and oriented x3 in no apparent distress  HEENT:  Pupils are equal, round, and reactive to light.  Extraocular muscles   are intact. Sclerae nonicteric.  Conjunctivae pink.  Oral cavity, tongue   protrudes midline.   NECK:  Supple without evidence of thyromegaly.  NODES:  No peripheral adenopathy of the neck, supraclavicular fossa or axillae   bilaterally.  LUNGS:  Clear to ascultation   HEART:  Regular rate and rhythm.  No murmur appreciated  ABDOMEN:  Soft. No evidence of hepatosplenomegaly.  Positive bowel sounds.  EXTREMITIES:  Without Edema.  NEUROLOGIC:  Cranial nerves II through XII were intact. Normal stance and gait motor and sensory grossly within normal limits          IMPRESSION:    A 54 y.o. 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  She had previously been treated to a left occipital lesion following resection and stereotactic radiotherapy with CyberKnife in 2017.  Currently on Verzenio and letrozole.Now with 6 new lesions all amenable to stereotactic radiotherapy.    RECOMMENDATIONS:   I discussed that we can proceed forward with stereotactic radiosurgery to these areas.  We have been in touch with Dr. Granado and she is to hold her Verzenio during the 5 treatments.  We gone through the risks and benefits along with the side effects both acute and chronic including but not exclusive to generalized fatigue damage to the tissues within the treatment field possible dizziness headache and neurocognitive decline.We have her tentatively scheduled for simulation on Wednesday to get started soon thereafter.      Thank you for the opportunity to participate in her care.  If any questions or comments, please do not hesitate in calling.      Please note that this dictation was created using voice recognition  software. I have made every reasonable attempt to correct obvious errors, but I expect that there are errors of grammar and possibly content that I did not discover before finalizing the note.

## 2020-08-03 NOTE — NON-PROVIDER
Patient was seen today in clinic with Dr. Darnell for Follow up.  Vitals signs and weight were obtained and pain assessment was completed.  Allergies and medications were reviewed with the patient.  Review of systems completed.     Vitals/Pain:  Vitals:    08/03/20 1348   BP: 118/71   Pulse: (!) 58   SpO2: 98%   Weight: 62.6 kg (138 lb)   Pain Score: No pain        Allergies:   Patient has no known allergies.    Current Medications:  Current Outpatient Medications   Medication Sig Dispense Refill   • Denosumab (XGEVA SC) Inject  as instructed.     • Ivermectin 1 % Cream 1 Application by Apply externally route every day. (Patient not taking: Reported on 8/21/2019) 1 Tube 3   • VERZENIO 150 MG Tab Take 150 mg by mouth 2 Times a Day.     • Fulvestrant (FASLODEX IM) by Intramuscular route.     • atorvastatin (LIPITOR) 80 MG tablet Take 80 mg by mouth every day.     • CALCIUM PO Take 2,000 mg by mouth every day.       No current facility-administered medications for this encounter.          PCP:  No primary care provider on file.        Suze Mix, Med Ass't

## 2020-08-05 ENCOUNTER — HOSPITAL ENCOUNTER (OUTPATIENT)
Dept: RADIATION ONCOLOGY | Facility: MEDICAL CENTER | Age: 55
End: 2020-08-05
Payer: COMMERCIAL

## 2020-08-05 PROCEDURE — 77334 RADIATION TREATMENT AID(S): CPT | Performed by: RADIOLOGY

## 2020-08-05 PROCEDURE — 77334 RADIATION TREATMENT AID(S): CPT | Mod: 26 | Performed by: RADIOLOGY

## 2020-08-05 PROCEDURE — 77470 SPECIAL RADIATION TREATMENT: CPT | Mod: 26 | Performed by: RADIOLOGY

## 2020-08-05 PROCEDURE — 77290 THER RAD SIMULAJ FIELD CPLX: CPT | Performed by: RADIOLOGY

## 2020-08-05 PROCEDURE — 77263 THER RADIOLOGY TX PLNG CPLX: CPT | Performed by: RADIOLOGY

## 2020-08-05 PROCEDURE — 77470 SPECIAL RADIATION TREATMENT: CPT | Performed by: RADIOLOGY

## 2020-08-05 PROCEDURE — 77290 THER RAD SIMULAJ FIELD CPLX: CPT | Mod: 26 | Performed by: RADIOLOGY

## 2020-08-10 PROCEDURE — 77370 RADIATION PHYSICS CONSULT: CPT | Performed by: RADIOLOGY

## 2020-08-11 PROCEDURE — 77300 RADIATION THERAPY DOSE PLAN: CPT | Mod: 26 | Performed by: RADIOLOGY

## 2020-08-11 PROCEDURE — 77334 RADIATION TREATMENT AID(S): CPT | Mod: 26 | Performed by: RADIOLOGY

## 2020-08-11 PROCEDURE — 77295 3-D RADIOTHERAPY PLAN: CPT | Mod: 26 | Performed by: RADIOLOGY

## 2020-08-11 PROCEDURE — 77300 RADIATION THERAPY DOSE PLAN: CPT | Performed by: RADIOLOGY

## 2020-08-11 PROCEDURE — 77295 3-D RADIOTHERAPY PLAN: CPT | Performed by: RADIOLOGY

## 2020-08-11 PROCEDURE — 77334 RADIATION TREATMENT AID(S): CPT | Performed by: RADIOLOGY

## 2020-08-12 ENCOUNTER — HOSPITAL ENCOUNTER (OUTPATIENT)
Dept: RADIATION ONCOLOGY | Facility: MEDICAL CENTER | Age: 55
End: 2020-08-12
Payer: COMMERCIAL

## 2020-08-12 VITALS
SYSTOLIC BLOOD PRESSURE: 117 MMHG | DIASTOLIC BLOOD PRESSURE: 64 MMHG | BODY MASS INDEX: 23.5 KG/M2 | OXYGEN SATURATION: 97 % | WEIGHT: 141.2 LBS | HEART RATE: 60 BPM

## 2020-08-12 DIAGNOSIS — C79.31 BREAST CANCER METASTASIZED TO BRAIN, LEFT (HCC): ICD-10-CM

## 2020-08-12 DIAGNOSIS — C50.912 BREAST CANCER METASTASIZED TO BRAIN, LEFT (HCC): ICD-10-CM

## 2020-08-12 LAB
CHEMOTHERAPY INFUSION START DATE: NORMAL
CHEMOTHERAPY RECORDS: 2500
CHEMOTHERAPY RECORDS: 5
CHEMOTHERAPY RECORDS: NORMAL
CHEMOTHERAPY RX CANCER: NORMAL
DATE 1ST CHEMO CANCER: NORMAL
RAD ONC ARIA COURSE LAST TREATMENT DATE: NORMAL
RAD ONC ARIA COURSE TREATMENT ELAPSED DAYS: NORMAL
RAD ONC ARIA REFERENCE POINT DOSAGE GIVEN TO DATE: 5
RAD ONC ARIA REFERENCE POINT DOSAGE GIVEN TO DATE: 5.86
RAD ONC ARIA REFERENCE POINT ID: NORMAL
RAD ONC ARIA REFERENCE POINT ID: NORMAL
RAD ONC ARIA REFERENCE POINT SESSION DOSAGE GIVEN: 5
RAD ONC ARIA REFERENCE POINT SESSION DOSAGE GIVEN: 5.86

## 2020-08-12 PROCEDURE — 77280 THER RAD SIMULAJ FIELD SMPL: CPT | Mod: 26 | Performed by: RADIOLOGY

## 2020-08-12 PROCEDURE — 77280 THER RAD SIMULAJ FIELD SMPL: CPT | Performed by: RADIOLOGY

## 2020-08-12 PROCEDURE — 77373 STRTCTC BDY RAD THER TX DLVR: CPT | Performed by: RADIOLOGY

## 2020-08-12 PROCEDURE — 77435 SBRT MANAGEMENT: CPT | Performed by: RADIOLOGY

## 2020-08-12 ASSESSMENT — FIBROSIS 4 INDEX: FIB4 SCORE: 1.05

## 2020-08-12 ASSESSMENT — PAIN SCALES - GENERAL: PAINLEVEL: NO PAIN

## 2020-08-12 NOTE — ON TREATMENT VISIT
ON TREATMENT  NOTE  RADIATION ONCOLOGY DEPARTMENT    Patient name:  Michelle Diana    Primary Physician:  No primary care provider on file. MRN: [id number]  CSN: [id number]   Referring physician:  Mi Granado M.D. : 1965, 54 y.o.     ENCOUNTER DATE:  20    DIAGNOSIS:    Breast cancer metastasized to brain, left (HCC)  Staging form: BREAST AJCC V7  - Pathologic: Stage IV (T2, N0, M1) - Signed by Merlene Darnell M.D. on 2/3/2020  Laterality: Left  Method of detection of distant metastases: Radiographic  Biopsy of metastatic site performed: No  Source of metastatic specimen: Brain/CNS  Histologic grade (G): G2  Estrogen receptor status: Positive  Progesterone receptor status: Negative  HER2 status: Negative      TREATMENT SUMMARY:  [facility] Treatment Information        Some values may be hidden. Unless noted otherwise, only the newest values recorded on each date are displayed.         [facility] Treatment Summary 20   Course First Treatment Date 2020   Course Last Treatment Date 2020   6 Lesion SRT Plan from Course C2_SRSbrain   Fraction 1 of 5   Elapsed Course Days 0 @    Prescribed Fraction Dose 500 cGy   Prescribed Total Dose 2,500 cGy   6 Lesion SRT Reference Point from Course C2_SRSbrain   Elapsed Course Days 0 @ [id number]   Session Dose 500 cGy   Total Dose 500 cGy   6 Lesion SRT CP Reference Point from Course C2_SRSbrain   Elapsed Course Days 0 @    Session Dose 586 cGy   Total Dose 586 cGy             SUBJECTIVE:  Tolerated first treatment        VITAL SIGNS:  KPS: 100, Fully active, able to carry on all pre-disease performed without restriction (ECOG equivalent 0)  Encounter Vitals  Blood Pressure: 117/64  Pulse: 60  Pulse Oximetry: 97 %  Weight: 64 kg (141 lb 3.2 oz)  Pain Score: No pain  Pain Assessment 2020 8/3/2020 2020 2020   Pain Score 0 0 0 0   Some recent data might be hidden          PHYSICAL EXAM:  No  change      Toxicity Assessment 8/12/2020   Toxicity Assessment Brain   Fatigue (lethargy, malaise, asthenia) None   Radiation Dermatitis None   Nausea None   Headache None   External Auditory Canal Normal   Dizziness/lightheadedness None   Memory loss Normal   Neuropathy - Motor Normal   Seizure None   Vertigo None           IMPRESSION:  Cancer Staging  Breast cancer metastasized to brain, left (HCC)  Staging form: BREAST AJCC V7  - Pathologic: Stage IV (T2, N0, M1) - Signed by Merlene Darnell M.D. on 2/3/2020      PLAN:  No change in treatment plan    Disposition:  Treatment plan reviewed. Questions answered. Continue therapy outlined.     Merlene Darnell M.D.    Orders Placed This Encounter   • MR-BRAIN-WITH & W/O

## 2020-08-13 ENCOUNTER — HOSPITAL ENCOUNTER (OUTPATIENT)
Dept: RADIATION ONCOLOGY | Facility: MEDICAL CENTER | Age: 55
End: 2020-08-13
Payer: COMMERCIAL

## 2020-08-13 LAB
CHEMOTHERAPY INFUSION START DATE: NORMAL
CHEMOTHERAPY RECORDS: 2500
CHEMOTHERAPY RECORDS: 5
CHEMOTHERAPY RECORDS: NORMAL
CHEMOTHERAPY RX CANCER: NORMAL
DATE 1ST CHEMO CANCER: NORMAL
RAD ONC ARIA COURSE LAST TREATMENT DATE: NORMAL
RAD ONC ARIA COURSE TREATMENT ELAPSED DAYS: NORMAL
RAD ONC ARIA REFERENCE POINT DOSAGE GIVEN TO DATE: 10
RAD ONC ARIA REFERENCE POINT DOSAGE GIVEN TO DATE: 11.71
RAD ONC ARIA REFERENCE POINT ID: NORMAL
RAD ONC ARIA REFERENCE POINT ID: NORMAL
RAD ONC ARIA REFERENCE POINT SESSION DOSAGE GIVEN: 5
RAD ONC ARIA REFERENCE POINT SESSION DOSAGE GIVEN: 5.86

## 2020-08-13 PROCEDURE — 77280 THER RAD SIMULAJ FIELD SMPL: CPT | Mod: 26 | Performed by: RADIOLOGY

## 2020-08-13 PROCEDURE — 77373 STRTCTC BDY RAD THER TX DLVR: CPT | Performed by: RADIOLOGY

## 2020-08-13 PROCEDURE — 77280 THER RAD SIMULAJ FIELD SMPL: CPT | Performed by: RADIOLOGY

## 2020-08-14 ENCOUNTER — HOSPITAL ENCOUNTER (OUTPATIENT)
Dept: RADIATION ONCOLOGY | Facility: MEDICAL CENTER | Age: 55
End: 2020-08-14
Payer: COMMERCIAL

## 2020-08-14 LAB
CHEMOTHERAPY INFUSION START DATE: NORMAL
CHEMOTHERAPY RECORDS: 2500
CHEMOTHERAPY RECORDS: 5
CHEMOTHERAPY RECORDS: NORMAL
CHEMOTHERAPY RX CANCER: NORMAL
DATE 1ST CHEMO CANCER: NORMAL
RAD ONC ARIA COURSE LAST TREATMENT DATE: NORMAL
RAD ONC ARIA COURSE TREATMENT ELAPSED DAYS: NORMAL
RAD ONC ARIA REFERENCE POINT DOSAGE GIVEN TO DATE: 15
RAD ONC ARIA REFERENCE POINT DOSAGE GIVEN TO DATE: 17.57
RAD ONC ARIA REFERENCE POINT ID: NORMAL
RAD ONC ARIA REFERENCE POINT ID: NORMAL
RAD ONC ARIA REFERENCE POINT SESSION DOSAGE GIVEN: 5
RAD ONC ARIA REFERENCE POINT SESSION DOSAGE GIVEN: 5.86

## 2020-08-14 PROCEDURE — 77336 RADIATION PHYSICS CONSULT: CPT | Performed by: RADIOLOGY

## 2020-08-14 PROCEDURE — 77280 THER RAD SIMULAJ FIELD SMPL: CPT | Performed by: RADIOLOGY

## 2020-08-14 PROCEDURE — 77373 STRTCTC BDY RAD THER TX DLVR: CPT | Performed by: RADIOLOGY

## 2020-08-14 PROCEDURE — 77280 THER RAD SIMULAJ FIELD SMPL: CPT | Mod: 26 | Performed by: RADIOLOGY

## 2020-08-17 ENCOUNTER — HOSPITAL ENCOUNTER (OUTPATIENT)
Dept: RADIATION ONCOLOGY | Facility: MEDICAL CENTER | Age: 55
End: 2020-08-17
Payer: COMMERCIAL

## 2020-08-17 LAB
CHEMOTHERAPY INFUSION START DATE: NORMAL
CHEMOTHERAPY RECORDS: 2500
CHEMOTHERAPY RECORDS: 5
CHEMOTHERAPY RECORDS: NORMAL
CHEMOTHERAPY RX CANCER: NORMAL
DATE 1ST CHEMO CANCER: NORMAL
RAD ONC ARIA COURSE LAST TREATMENT DATE: NORMAL
RAD ONC ARIA COURSE TREATMENT ELAPSED DAYS: NORMAL
RAD ONC ARIA REFERENCE POINT DOSAGE GIVEN TO DATE: 20
RAD ONC ARIA REFERENCE POINT DOSAGE GIVEN TO DATE: 23.42
RAD ONC ARIA REFERENCE POINT ID: NORMAL
RAD ONC ARIA REFERENCE POINT ID: NORMAL
RAD ONC ARIA REFERENCE POINT SESSION DOSAGE GIVEN: 5
RAD ONC ARIA REFERENCE POINT SESSION DOSAGE GIVEN: 5.86

## 2020-08-17 PROCEDURE — 77280 THER RAD SIMULAJ FIELD SMPL: CPT | Performed by: RADIOLOGY

## 2020-08-17 PROCEDURE — 77373 STRTCTC BDY RAD THER TX DLVR: CPT | Performed by: RADIOLOGY

## 2020-08-17 PROCEDURE — 77280 THER RAD SIMULAJ FIELD SMPL: CPT | Mod: 26 | Performed by: RADIOLOGY

## 2020-08-18 ENCOUNTER — HOSPITAL ENCOUNTER (OUTPATIENT)
Dept: RADIATION ONCOLOGY | Facility: MEDICAL CENTER | Age: 55
End: 2020-08-18
Payer: COMMERCIAL

## 2020-08-18 ENCOUNTER — HOSPITAL ENCOUNTER (OUTPATIENT)
Dept: LAB | Facility: MEDICAL CENTER | Age: 55
End: 2020-08-18
Attending: INTERNAL MEDICINE
Payer: COMMERCIAL

## 2020-08-18 LAB
ALBUMIN SERPL BCP-MCNC: 4.2 G/DL (ref 3.2–4.9)
ALBUMIN/GLOB SERPL: 1.6 G/DL
ALP SERPL-CCNC: 52 U/L (ref 30–99)
ALT SERPL-CCNC: 20 U/L (ref 2–50)
ANION GAP SERPL CALC-SCNC: 13 MMOL/L (ref 7–16)
AST SERPL-CCNC: 16 U/L (ref 12–45)
BILIRUB SERPL-MCNC: 0.3 MG/DL (ref 0.1–1.5)
BUN SERPL-MCNC: 21 MG/DL (ref 8–22)
CALCIUM SERPL-MCNC: 9.9 MG/DL (ref 8.5–10.5)
CHEMOTHERAPY INFUSION START DATE: NORMAL
CHEMOTHERAPY RECORDS: 2500
CHEMOTHERAPY RECORDS: 5
CHEMOTHERAPY RECORDS: NORMAL
CHEMOTHERAPY RX CANCER: NORMAL
CHLORIDE SERPL-SCNC: 100 MMOL/L (ref 96–112)
CO2 SERPL-SCNC: 25 MMOL/L (ref 20–33)
CREAT SERPL-MCNC: 0.95 MG/DL (ref 0.5–1.4)
DATE 1ST CHEMO CANCER: NORMAL
GLOBULIN SER CALC-MCNC: 2.6 G/DL (ref 1.9–3.5)
GLUCOSE SERPL-MCNC: 102 MG/DL (ref 65–99)
MAGNESIUM SERPL-MCNC: 2.1 MG/DL (ref 1.5–2.5)
PHOSPHATE SERPL-MCNC: 4.3 MG/DL (ref 2.5–4.5)
POTASSIUM SERPL-SCNC: 4.3 MMOL/L (ref 3.6–5.5)
PROT SERPL-MCNC: 6.8 G/DL (ref 6–8.2)
RAD ONC ARIA COURSE LAST TREATMENT DATE: NORMAL
RAD ONC ARIA COURSE TREATMENT ELAPSED DAYS: NORMAL
RAD ONC ARIA REFERENCE POINT DOSAGE GIVEN TO DATE: 25
RAD ONC ARIA REFERENCE POINT DOSAGE GIVEN TO DATE: 29.28
RAD ONC ARIA REFERENCE POINT ID: NORMAL
RAD ONC ARIA REFERENCE POINT ID: NORMAL
RAD ONC ARIA REFERENCE POINT SESSION DOSAGE GIVEN: 5
RAD ONC ARIA REFERENCE POINT SESSION DOSAGE GIVEN: 5.86
SODIUM SERPL-SCNC: 138 MMOL/L (ref 135–145)

## 2020-08-18 PROCEDURE — 77280 THER RAD SIMULAJ FIELD SMPL: CPT | Mod: 26 | Performed by: RADIOLOGY

## 2020-08-18 PROCEDURE — 83735 ASSAY OF MAGNESIUM: CPT

## 2020-08-18 PROCEDURE — 80053 COMPREHEN METABOLIC PANEL: CPT

## 2020-08-18 PROCEDURE — 77373 STRTCTC BDY RAD THER TX DLVR: CPT | Performed by: RADIOLOGY

## 2020-08-18 PROCEDURE — 36415 COLL VENOUS BLD VENIPUNCTURE: CPT

## 2020-08-18 PROCEDURE — 84100 ASSAY OF PHOSPHORUS: CPT

## 2020-08-18 PROCEDURE — 77280 THER RAD SIMULAJ FIELD SMPL: CPT | Performed by: RADIOLOGY

## 2020-09-10 ENCOUNTER — PATIENT OUTREACH (OUTPATIENT)
Dept: OTHER | Facility: MEDICAL CENTER | Age: 55
End: 2020-09-10

## 2020-09-10 NOTE — PROGRESS NOTES
Oncology Nurse Navigation  Phoned pt for f/u.  Pt states she is feeling well.  She denies having any s/e from XRT.  She will see Dr. Granado on 9/24/2020.  She is due to see Dr. Darnell on 10/23/2020.  Pt denies barriers.  Provided contact information and encouraged pt to call should she have any questions or concerns.

## 2020-09-18 ENCOUNTER — HOSPITAL ENCOUNTER (OUTPATIENT)
Dept: LAB | Facility: MEDICAL CENTER | Age: 55
End: 2020-09-18
Attending: INTERNAL MEDICINE
Payer: COMMERCIAL

## 2020-09-18 PROCEDURE — 36415 COLL VENOUS BLD VENIPUNCTURE: CPT

## 2020-09-18 PROCEDURE — 84100 ASSAY OF PHOSPHORUS: CPT

## 2020-09-18 PROCEDURE — 83735 ASSAY OF MAGNESIUM: CPT

## 2020-09-18 PROCEDURE — 80053 COMPREHEN METABOLIC PANEL: CPT

## 2020-09-19 LAB
ALBUMIN SERPL BCP-MCNC: 4.2 G/DL (ref 3.2–4.9)
ALBUMIN/GLOB SERPL: 1.6 G/DL
ALP SERPL-CCNC: 55 U/L (ref 30–99)
ALT SERPL-CCNC: 37 U/L (ref 2–50)
ANION GAP SERPL CALC-SCNC: 15 MMOL/L (ref 7–16)
AST SERPL-CCNC: 25 U/L (ref 12–45)
BILIRUB SERPL-MCNC: 0.3 MG/DL (ref 0.1–1.5)
BUN SERPL-MCNC: 21 MG/DL (ref 8–22)
CALCIUM SERPL-MCNC: 9.3 MG/DL (ref 8.5–10.5)
CHLORIDE SERPL-SCNC: 102 MMOL/L (ref 96–112)
CO2 SERPL-SCNC: 22 MMOL/L (ref 20–33)
CREAT SERPL-MCNC: 0.95 MG/DL (ref 0.5–1.4)
GLOBULIN SER CALC-MCNC: 2.7 G/DL (ref 1.9–3.5)
GLUCOSE SERPL-MCNC: 86 MG/DL (ref 65–99)
MAGNESIUM SERPL-MCNC: 2.2 MG/DL (ref 1.5–2.5)
PHOSPHATE SERPL-MCNC: 3.5 MG/DL (ref 2.5–4.5)
POTASSIUM SERPL-SCNC: 4.4 MMOL/L (ref 3.6–5.5)
PROT SERPL-MCNC: 6.9 G/DL (ref 6–8.2)
SODIUM SERPL-SCNC: 139 MMOL/L (ref 135–145)

## 2020-10-13 ENCOUNTER — HOSPITAL ENCOUNTER (OUTPATIENT)
Dept: RADIOLOGY | Facility: MEDICAL CENTER | Age: 55
End: 2020-10-13
Attending: INTERNAL MEDICINE
Payer: COMMERCIAL

## 2020-10-13 DIAGNOSIS — C50.412 MALIGNANT NEOPLASM OF UPPER-OUTER QUADRANT OF LEFT FEMALE BREAST, UNSPECIFIED ESTROGEN RECEPTOR STATUS (HCC): ICD-10-CM

## 2020-10-13 PROCEDURE — A9503 TC99M MEDRONATE: HCPCS

## 2020-10-19 ENCOUNTER — HOSPITAL ENCOUNTER (OUTPATIENT)
Dept: RADIOLOGY | Facility: MEDICAL CENTER | Age: 55
End: 2020-10-19
Attending: RADIOLOGY
Payer: COMMERCIAL

## 2020-10-19 ENCOUNTER — HOSPITAL ENCOUNTER (OUTPATIENT)
Dept: LAB | Facility: MEDICAL CENTER | Age: 55
End: 2020-10-19
Attending: INTERNAL MEDICINE
Payer: COMMERCIAL

## 2020-10-19 DIAGNOSIS — C50.912 BREAST CANCER METASTASIZED TO BRAIN, LEFT (HCC): ICD-10-CM

## 2020-10-19 DIAGNOSIS — C79.31 BREAST CANCER METASTASIZED TO BRAIN, LEFT (HCC): ICD-10-CM

## 2020-10-19 PROCEDURE — 70553 MRI BRAIN STEM W/O & W/DYE: CPT

## 2020-10-19 PROCEDURE — 700117 HCHG RX CONTRAST REV CODE 255: Performed by: RADIOLOGY

## 2020-10-19 PROCEDURE — A9576 INJ PROHANCE MULTIPACK: HCPCS | Performed by: RADIOLOGY

## 2020-10-19 RX ADMIN — GADOTERIDOL 10 ML: 279.3 INJECTION, SOLUTION INTRAVENOUS at 11:24

## 2020-10-20 ENCOUNTER — OFFICE VISIT (OUTPATIENT)
Dept: DERMATOLOGY | Facility: IMAGING CENTER | Age: 55
End: 2020-10-20
Payer: COMMERCIAL

## 2020-10-20 ENCOUNTER — HOSPITAL ENCOUNTER (OUTPATIENT)
Dept: LAB | Facility: MEDICAL CENTER | Age: 55
End: 2020-10-20
Attending: INTERNAL MEDICINE
Payer: COMMERCIAL

## 2020-10-20 DIAGNOSIS — L57.0 ACTINIC KERATOSES: ICD-10-CM

## 2020-10-20 DIAGNOSIS — Z12.83 SKIN CANCER SCREENING: ICD-10-CM

## 2020-10-20 DIAGNOSIS — L82.1 SEBORRHEIC KERATOSES: ICD-10-CM

## 2020-10-20 DIAGNOSIS — D22.9 MULTIPLE NEVI: ICD-10-CM

## 2020-10-20 DIAGNOSIS — L81.4 LENTIGINES: ICD-10-CM

## 2020-10-20 LAB
ALBUMIN SERPL BCP-MCNC: 4 G/DL (ref 3.2–4.9)
ALBUMIN/GLOB SERPL: 1.5 G/DL
ALP SERPL-CCNC: 67 U/L (ref 30–99)
ALT SERPL-CCNC: 39 U/L (ref 2–50)
ANION GAP SERPL CALC-SCNC: 9 MMOL/L (ref 7–16)
AST SERPL-CCNC: 23 U/L (ref 12–45)
BILIRUB SERPL-MCNC: 0.3 MG/DL (ref 0.1–1.5)
BUN SERPL-MCNC: 23 MG/DL (ref 8–22)
CALCIUM SERPL-MCNC: 9.4 MG/DL (ref 8.4–10.2)
CHLORIDE SERPL-SCNC: 105 MMOL/L (ref 96–112)
CO2 SERPL-SCNC: 24 MMOL/L (ref 20–33)
CREAT SERPL-MCNC: 0.86 MG/DL (ref 0.5–1.4)
FASTING STATUS PATIENT QL REPORTED: NORMAL
GLOBULIN SER CALC-MCNC: 2.7 G/DL (ref 1.9–3.5)
GLUCOSE SERPL-MCNC: 95 MG/DL (ref 65–99)
MAGNESIUM SERPL-MCNC: 2 MG/DL (ref 1.5–2.5)
PHOSPHATE SERPL-MCNC: 4.3 MG/DL (ref 2.5–4.5)
POTASSIUM SERPL-SCNC: 4.2 MMOL/L (ref 3.6–5.5)
PROT SERPL-MCNC: 6.7 G/DL (ref 6–8.2)
SODIUM SERPL-SCNC: 138 MMOL/L (ref 135–145)

## 2020-10-20 PROCEDURE — 36415 COLL VENOUS BLD VENIPUNCTURE: CPT

## 2020-10-20 PROCEDURE — 83735 ASSAY OF MAGNESIUM: CPT

## 2020-10-20 PROCEDURE — 80053 COMPREHEN METABOLIC PANEL: CPT

## 2020-10-20 PROCEDURE — 84100 ASSAY OF PHOSPHORUS: CPT

## 2020-10-20 PROCEDURE — 99999 PR NO CHARGE: CPT | Performed by: NURSE PRACTITIONER

## 2020-10-20 ASSESSMENT — ENCOUNTER SYMPTOMS
FEVER: 0
CHILLS: 0

## 2020-10-20 NOTE — PROGRESS NOTES
Dermatology Return Patient Visit    Chief Complaint   Patient presents with   • Follow-Up     COLETTE       Subjective:     HPI:   Michelle Diana is a 52 y.o. female presenting for    Previous Dr. Wesley patient last seen 8/21/19.    HPI/location: forehead  Time present: beginning of summer   Painful lesion: No  Itching lesion: No  Enlarging lesion: No  Anything make it better or worse? No    HPI/location: upper lip  Time present: beginning of summer  Painful lesion: No  Itching lesion: No  Enlarging lesion: No  Anything make it better or worse? No    History of skin cancer: No  History of precancers/actinic keratoses: Yes, Details: cryo, shave bx  History of biopsies:Yes, Details: was told Dx precancers  History of blistering/severe sunburns:Yes, Details: as achild  Family history of skin cancer:No  Family history of atypical moles:No  Has metastatic breast CA, on systemic treatment (PO)      Rash  Pertinent negatives include no fever.       Past Medical History:   Diagnosis Date   • Cancer (HCC)     Metastatic breast cancer dx 2016   • Chemotherapy adverse reaction     on oral abemicicilb and faslodex   • High cholesterol    • History of radiation therapy     2016 Left occipital and 2017 left breast  [place]       Current Outpatient Medications on File Prior to Visit   Medication Sig Dispense Refill   • Denosumab (XGEVA SC) Inject  as instructed.     • VERZENIO 150 MG Tab Take 150 mg by mouth 2 Times a Day.     • Fulvestrant (FASLODEX IM) by Intramuscular route.     • atorvastatin (LIPITOR) 80 MG tablet Take 80 mg by mouth every day.     • CALCIUM PO Take 2,000 mg by mouth every day.     • Ivermectin 1 % Cream 1 Application by Apply externally route every day. (Patient not taking: Reported on 8/21/2019) 1 Tube 3     No current facility-administered medications on file prior to visit.        No Known Allergies    Family History   Problem Relation Age of Onset   • Cancer Father         Prostate cancer/colon  cancer       Social History     Socioeconomic History   • Marital status:      Spouse name: Not on file   • Number of children: Not on file   • Years of education: Not on file   • Highest education level: Not on file   Occupational History   • Not on file   Social Needs   • Financial resource strain: Not on file   • Food insecurity     Worry: Not on file     Inability: Not on file   • Transportation needs     Medical: Not on file     Non-medical: Not on file   Tobacco Use   • Smoking status: Never Smoker   • Smokeless tobacco: Never Used   Substance and Sexual Activity   • Alcohol use: No   • Drug use: No   • Sexual activity: Not on file   Lifestyle   • Physical activity     Days per week: Not on file     Minutes per session: Not on file   • Stress: Not on file   Relationships   • Social connections     Talks on phone: Not on file     Gets together: Not on file     Attends Denominational service: Not on file     Active member of club or organization: Not on file     Attends meetings of clubs or organizations: Not on file     Relationship status: Not on file   • Intimate partner violence     Fear of current or ex partner: Not on file     Emotionally abused: Not on file     Physically abused: Not on file     Forced sexual activity: Not on file   Other Topics Concern   • Not on file   Social History Narrative   • Not on file       Review of Systems   Constitutional: Negative for chills and fever.   Skin: Negative for itching and rash.   All other systems reviewed and are negative.       Objective:     A full mucocutaneous exam was completed including: scalp, hair, ears, face, eyelids, conjunctiva, lips, gums/tongue/oropharynx, neck, chest, breasts, abdomen, back , left and right upper extremities (including hands/digits and fingernails), left and right lower extremities (including feet/toes, toenails), buttocks and excluding external genitalia (patient refusal') with the following pertinent findings listed below.  Remaining above-listed examined areas within normal limits / negative for rashes or lesions.    There were no vitals taken for this visit.    Physical Exam   Constitutional: She is oriented to person, place, and time and well-developed, well-nourished, and in no distress.   HENT:   Head: Normocephalic and atraumatic.       Right Ear: External ear normal.   Left Ear: External ear normal.   Nose: Nose normal.   Mouth/Throat: Oropharynx is clear and moist.   Eyes: Conjunctivae and lids are normal.   Neck: Normal range of motion. Neck supple.   Cardiovascular: Intact distal pulses.   Pulmonary/Chest: Effort normal.   Neurological: She is alert and oriented to person, place, and time.   Skin: Skin is warm and dry.        Psychiatric: Mood and affect normal.       DATA: none applicable to review    Assessment and Plan:     1. Multiple nevi  - Benign-appearing nature of lesions discussed. Advised to return to clinic for any new or concerning changes.  - ABCDE's of melanoma discussed    2. Lentigines  - Benign-appearing nature of lesions discussed. Advised to return to clinic for any new or concerning changes.    3. Seborrheic keratoses  - Benign-appearing nature of lesions discussed. Advised to return to clinic for any new or concerning changes.    4. Actinic keratoses  CRYOTHERAPY:  Risks (including, but not limited to: hypo or hyperpigmentation, redness, blister, blood blister, recurrence, need for further treatment, infection, scar) and benefits of cryotherapy discussed. Patient verbally agreed to proceed with treatment. 2 cryotherapy freeze thaw cycles of 10 seconds were applied to 1 lesions on the left lower leg with cryac. Patient tolerated procedure well. Aftercare instructions given.      Followup: Return in about 1 year (around 10/20/2021) for COLETTE or sooner for any concerns.    JOAN Bertrand.

## 2020-10-20 NOTE — PROGRESS NOTES
Dermatology Return Patient Visit    Chief Complaint   Patient presents with   • Follow-Up     COLETTE       Subjective:     HPI:   Michelle Diana is a 54 y.o. female presenting for    Previous Dr. Wesley patient last seen 8/21/19.    HPI/location: forehead  Time present: beginning of summer   Painful lesion: No  Itching lesion: No  Enlarging lesion: No  Anything make it better or worse? No    HPI/location: upper lip  Time present: beginning of summer  Painful lesion: No  Itching lesion: No  Enlarging lesion: No  Anything make it better or worse? No    History of skin cancer: No  History of precancers/actinic keratoses: Yes, Details: cryo, shave bx  History of biopsies:Yes, Details: was told Dx precancers  History of blistering/severe sunburns:Yes, Details: as achild  Family history of skin cancer:No  Family history of atypical moles:No  Has metastatic breast CA, on systemic treatment (PO)      Past Medical History:   Diagnosis Date   • Cancer (HCC)     Metastatic breast cancer dx 2016   • Chemotherapy adverse reaction     on oral abemicicilb and faslodex   • High cholesterol    • History of radiation therapy     2016 Left occipital and 2017 left breast  [place]       Current Outpatient Medications on File Prior to Visit   Medication Sig Dispense Refill   • Denosumab (XGEVA SC) Inject  as instructed.     • VERZENIO 150 MG Tab Take 150 mg by mouth 2 Times a Day.     • Fulvestrant (FASLODEX IM) by Intramuscular route.     • atorvastatin (LIPITOR) 80 MG tablet Take 80 mg by mouth every day.     • CALCIUM PO Take 2,000 mg by mouth every day.     • Ivermectin 1 % Cream 1 Application by Apply externally route every day. (Patient not taking: Reported on 8/21/2019) 1 Tube 3     No current facility-administered medications on file prior to visit.        No Known Allergies    Family History   Problem Relation Age of Onset   • Cancer Father         Prostate cancer/colon cancer       Social History     Socioeconomic  History   • Marital status:      Spouse name: Not on file   • Number of children: Not on file   • Years of education: Not on file   • Highest education level: Not on file   Occupational History   • Not on file   Social Needs   • Financial resource strain: Not on file   • Food insecurity     Worry: Not on file     Inability: Not on file   • Transportation needs     Medical: Not on file     Non-medical: Not on file   Tobacco Use   • Smoking status: Never Smoker   • Smokeless tobacco: Never Used   Substance and Sexual Activity   • Alcohol use: No   • Drug use: No   • Sexual activity: Not on file   Lifestyle   • Physical activity     Days per week: Not on file     Minutes per session: Not on file   • Stress: Not on file   Relationships   • Social connections     Talks on phone: Not on file     Gets together: Not on file     Attends Anabaptist service: Not on file     Active member of club or organization: Not on file     Attends meetings of clubs or organizations: Not on file     Relationship status: Not on file   • Intimate partner violence     Fear of current or ex partner: Not on file     Emotionally abused: Not on file     Physically abused: Not on file     Forced sexual activity: Not on file   Other Topics Concern   • Not on file   Social History Narrative   • Not on file       ROS     Objective:     A {kmskinexamfullfocused:20740} exam was completed including: scalp, hair, ears, face, eyelids, conjunctiva, lips, gums/tongue/oropharynx***, neck, {KMTSECB:20696}, abdomen, back, left and right upper extremities (including {KMTSEhands:20697}), left and right lower extremities (including {kmtsefeet:20698}), buttocks, {kmtsegenitalia:20699} external genitalia (patient refusal***) with the following pertinent findings listed below. Remaining above-listed examined areas within normal limits / negative for rashes or lesions.    There were no vitals taken for this visit.    Physical Exam    DATA: none applicable to  review***    Assessment and Plan:     There are no diagnoses linked to this encounter.    Followup: No follow-ups on file.    JOAN Bertrand.

## 2020-10-23 ENCOUNTER — HOSPITAL ENCOUNTER (OUTPATIENT)
Dept: RADIATION ONCOLOGY | Facility: MEDICAL CENTER | Age: 55
End: 2020-10-31
Attending: RADIOLOGY
Payer: COMMERCIAL

## 2020-10-23 VITALS
OXYGEN SATURATION: 97 % | BODY MASS INDEX: 24.39 KG/M2 | TEMPERATURE: 97.3 F | WEIGHT: 146.39 LBS | HEIGHT: 65 IN | DIASTOLIC BLOOD PRESSURE: 73 MMHG | HEART RATE: 68 BPM | SYSTOLIC BLOOD PRESSURE: 132 MMHG

## 2020-10-23 DIAGNOSIS — C79.31 BRAIN METASTASES: ICD-10-CM

## 2020-10-23 PROCEDURE — 99212 OFFICE O/P EST SF 10 MIN: CPT | Performed by: RADIOLOGY

## 2020-10-23 PROCEDURE — 99215 OFFICE O/P EST HI 40 MIN: CPT | Performed by: RADIOLOGY

## 2020-10-23 ASSESSMENT — PAIN SCALES - GENERAL: PAINLEVEL: NO PAIN

## 2020-10-23 NOTE — PROGRESS NOTES
RADIATION ONCOLOGY FOLLOW-UP    DATE OF SERVICE: 10/23/2020    IDENTIFICATION:   A 54 y.o. 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  She had previously been treated to a left occipital lesion following resection and stereotactic radiotherapy with CyberKnife in 2017.  Currently on Verzenio and letrozole.Here to review the results of her recent MRI scan.     HISTORY OF PRESENT ILLNESS:   Since last seen patient has been doing well she is without any new complaints she had no side effects with the previous radiation.  MRI scan today 10/19/2020 shows findings consistent with progression of disease.  The MRI scan report shows that lesions have increased in size and there are new lesions however on my review with the dosimetrist we feel that there is only one new lesion and all the other lesions of note were previously treated.  The lesion in the right frontal operculum measuring 9 mm is new and was not present on the prior scan.    CURRENT MEDICATIONS:  Current Outpatient Medications   Medication Sig Dispense Refill   • Denosumab (XGEVA SC) Inject  as instructed.     • VERZENIO 150 MG Tab Take 150 mg by mouth 2 Times a Day.     • Fulvestrant (FASLODEX IM) by Intramuscular route.     • atorvastatin (LIPITOR) 80 MG tablet Take 80 mg by mouth every day.     • CALCIUM PO Take 2,000 mg by mouth every day.     • Ivermectin 1 % Cream 1 Application by Apply externally route every day. (Patient not taking: Reported on 8/21/2019) 1 Tube 3     No current facility-administered medications for this encounter.        ALLERGIES:  Patient has no known allergies.    FAMILY HISTORY:    Family History   Problem Relation Age of Onset   • Cancer Father         Prostate cancer/colon cancer   [unfilled]        SOCIAL HISTORY:     reports  "that she has never smoked. She has never used smokeless tobacco. She reports that she does not drink alcohol or use drugs.      REVIEW OF SYSTEMS: Is significant for Nothing  The rest of the review of systems has been reviewed by me and is documented in the nursing note in [facility] dated 10/23/2020    PHYSICAL EXAM:     ECOG PERFORMANCE STATUS:  0= Fully active, able to carry on all pre-disease performance without restriction.       Vitals:    10/23/20 0953   BP: 132/73   BP Location: Right arm   Patient Position: Sitting   BP Cuff Size: Adult   Pulse: 68   Temp: 36.3 °C (97.3 °F)   TempSrc: Temporal   SpO2: 97%   Weight: 66.4 kg (146 lb 6.2 oz)   Height: 1.651 m (5' 5\")   Pain Score: No pain        GENERAL: Well-appearing alert and oriented x3 in no apparent distress  HEENT:  Pupils are equal, round, and reactive to light.  Extraocular muscles   are intact. Sclerae nonicteric.  Conjunctivae pink.  Oral cavity, tongue   protrudes midline.   NECK: No peripheral adenopathy of the neck, supraclavicular fossa or axillae   bilaterally.  LUNGS:  Clear to ascultation   HEART:  Regular rate and rhythm.  No murmur appreciated  ABDOMEN:  Soft. No evidence of hepatosplenomegaly.  Positive bowel sounds.  EXTREMITIES:  Without Edema.  NEUROLOGIC:  Cranial nerves II through XII were intact. Normal stance and gait motor and sensory grossly within normal limits          IMPRESSION:    A 54 y.o. 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  She had previously been treated to a left occipital lesion following resection and stereotactic radiotherapy with CyberKnife in 2017.  Currently on Verzenio and letrozole.Now with new solitary metastasis in the right operculum currently asymptomatic.    RECOMMENDATIONS:   Discussed with patient we " can treat this with stereotactic radiosurgery.  We have her scheduled for simulation next week to get started soon thereafter.    Details of radiation side effects were again discussed in detail.      Thank you for the opportunity to participate in her care.  If any questions or comments, please do not hesitate in calling.      Please note that this dictation was created using voice recognition software. I have made every reasonable attempt to correct obvious errors, but I expect that there are errors of grammar and possibly content that I did not discover before finalizing the note.

## 2020-10-23 NOTE — NON-PROVIDER
"Patient was seen today in clinic with Dr. Darnell for follow up.  Vitals signs and weight were obtained and pain assessment was completed.  Allergies and medications were reviewed with the patient.  Review of systems completed.     Vitals/Pain:  Vitals:    10/23/20 0953   BP: 132/73   BP Location: Right arm   Patient Position: Sitting   BP Cuff Size: Adult   Pulse: 68   Temp: 36.3 °C (97.3 °F)   TempSrc: Temporal   SpO2: 97%   Weight: 66.4 kg (146 lb 6.2 oz)   Height: 1.651 m (5' 5\")   Pain Score: No pain        Allergies:   Patient has no known allergies.    Current Medications:  Current Outpatient Medications   Medication Sig Dispense Refill   • Denosumab (XGEVA SC) Inject  as instructed.     • VERZENIO 150 MG Tab Take 150 mg by mouth 2 Times a Day.     • Fulvestrant (FASLODEX IM) by Intramuscular route.     • atorvastatin (LIPITOR) 80 MG tablet Take 80 mg by mouth every day.     • CALCIUM PO Take 2,000 mg by mouth every day.     • Ivermectin 1 % Cream 1 Application by Apply externally route every day. (Patient not taking: Reported on 8/21/2019) 1 Tube 3     No current facility-administered medications for this encounter.          PCP:  No primary care provider on file.        Suzette Meza, Med Ass't    "

## 2020-10-23 NOTE — ADDENDUM NOTE
Encounter addended by: Merlene Darnell M.D. on: 10/23/2020 10:55 AM   Actions taken: Visit diagnoses modified, Order list changed, Diagnosis association updated, Clinical Note Signed

## 2020-10-23 NOTE — CT SIMULATION
PATIENT NAME Michelle Jerry [place]   PRIMARY PHYSICIAN No primary care provider on file. [id number]   REFERRING PHYSICIAN Mi Granado M.D. 1965     Breast cancer metastasized to brain, left (HCC)  Staging form: BREAST AJCC V7  - Pathologic: Stage IV (T2, N0, M1) - Signed by Merlene Darnell M.D. on 2/3/2020  Laterality: Left  Method of detection of distant metastases: Radiographic  Biopsy of metastatic site performed: No  Source of metastatic specimen: Brain/CNS  Histologic grade (G): G2  Estrogen receptor status: Positive  Progesterone receptor status: Negative  HER2 status: Negative      Treatment Planning CT Simulation    No orders found for this encounter

## 2020-10-27 ENCOUNTER — HOSPITAL ENCOUNTER (OUTPATIENT)
Dept: RADIATION ONCOLOGY | Facility: MEDICAL CENTER | Age: 55
End: 2020-10-27
Payer: COMMERCIAL

## 2020-10-27 PROCEDURE — 77470 SPECIAL RADIATION TREATMENT: CPT | Mod: 26 | Performed by: RADIOLOGY

## 2020-10-27 PROCEDURE — 77263 THER RADIOLOGY TX PLNG CPLX: CPT | Performed by: RADIOLOGY

## 2020-10-27 PROCEDURE — 77290 THER RAD SIMULAJ FIELD CPLX: CPT | Mod: 26 | Performed by: RADIOLOGY

## 2020-10-27 PROCEDURE — 77334 RADIATION TREATMENT AID(S): CPT | Mod: 26 | Performed by: RADIOLOGY

## 2020-10-27 PROCEDURE — 77334 RADIATION TREATMENT AID(S): CPT | Performed by: RADIOLOGY

## 2020-10-27 PROCEDURE — 77470 SPECIAL RADIATION TREATMENT: CPT | Performed by: RADIOLOGY

## 2020-10-27 PROCEDURE — 77290 THER RAD SIMULAJ FIELD CPLX: CPT | Performed by: RADIOLOGY

## 2020-11-02 ENCOUNTER — HOSPITAL ENCOUNTER (OUTPATIENT)
Dept: RADIATION ONCOLOGY | Facility: MEDICAL CENTER | Age: 55
End: 2020-11-30
Attending: RADIOLOGY
Payer: COMMERCIAL

## 2020-11-04 PROCEDURE — 77334 RADIATION TREATMENT AID(S): CPT | Mod: 26 | Performed by: RADIOLOGY

## 2020-11-04 PROCEDURE — 77295 3-D RADIOTHERAPY PLAN: CPT | Mod: 26 | Performed by: RADIOLOGY

## 2020-11-04 PROCEDURE — 77300 RADIATION THERAPY DOSE PLAN: CPT | Performed by: RADIOLOGY

## 2020-11-04 PROCEDURE — 77334 RADIATION TREATMENT AID(S): CPT | Performed by: RADIOLOGY

## 2020-11-04 PROCEDURE — 77295 3-D RADIOTHERAPY PLAN: CPT | Performed by: RADIOLOGY

## 2020-11-04 PROCEDURE — 77300 RADIATION THERAPY DOSE PLAN: CPT | Mod: 26 | Performed by: RADIOLOGY

## 2020-11-06 ENCOUNTER — HOSPITAL ENCOUNTER (OUTPATIENT)
Dept: RADIATION ONCOLOGY | Facility: MEDICAL CENTER | Age: 55
End: 2020-11-06
Payer: COMMERCIAL

## 2020-11-06 DIAGNOSIS — C79.31 BRAIN METASTASES: ICD-10-CM

## 2020-11-06 LAB
CHEMOTHERAPY INFUSION START DATE: NORMAL
CHEMOTHERAPY RECORDS: 2700
CHEMOTHERAPY RECORDS: 9
CHEMOTHERAPY RECORDS: NORMAL
CHEMOTHERAPY RX CANCER: NORMAL
DATE 1ST CHEMO CANCER: NORMAL
RAD ONC ARIA COURSE LAST TREATMENT DATE: NORMAL
RAD ONC ARIA COURSE TREATMENT ELAPSED DAYS: NORMAL
RAD ONC ARIA REFERENCE POINT DOSAGE GIVEN TO DATE: 10.47
RAD ONC ARIA REFERENCE POINT DOSAGE GIVEN TO DATE: 9
RAD ONC ARIA REFERENCE POINT ID: NORMAL
RAD ONC ARIA REFERENCE POINT ID: NORMAL
RAD ONC ARIA REFERENCE POINT SESSION DOSAGE GIVEN: 10.47
RAD ONC ARIA REFERENCE POINT SESSION DOSAGE GIVEN: 9

## 2020-11-06 PROCEDURE — 77280 THER RAD SIMULAJ FIELD SMPL: CPT | Mod: 26 | Performed by: RADIOLOGY

## 2020-11-06 PROCEDURE — 77373 STRTCTC BDY RAD THER TX DLVR: CPT | Performed by: RADIOLOGY

## 2020-11-06 PROCEDURE — 77280 THER RAD SIMULAJ FIELD SMPL: CPT | Performed by: RADIOLOGY

## 2020-11-09 ENCOUNTER — HOSPITAL ENCOUNTER (OUTPATIENT)
Dept: RADIATION ONCOLOGY | Facility: MEDICAL CENTER | Age: 55
End: 2020-11-09
Payer: COMMERCIAL

## 2020-11-09 LAB
CHEMOTHERAPY INFUSION START DATE: NORMAL
CHEMOTHERAPY RECORDS: 2700
CHEMOTHERAPY RECORDS: 9
CHEMOTHERAPY RECORDS: NORMAL
CHEMOTHERAPY RX CANCER: NORMAL
DATE 1ST CHEMO CANCER: NORMAL
RAD ONC ARIA COURSE LAST TREATMENT DATE: NORMAL
RAD ONC ARIA COURSE TREATMENT ELAPSED DAYS: NORMAL
RAD ONC ARIA REFERENCE POINT DOSAGE GIVEN TO DATE: 18
RAD ONC ARIA REFERENCE POINT DOSAGE GIVEN TO DATE: 20.95
RAD ONC ARIA REFERENCE POINT ID: NORMAL
RAD ONC ARIA REFERENCE POINT ID: NORMAL
RAD ONC ARIA REFERENCE POINT SESSION DOSAGE GIVEN: 10.47
RAD ONC ARIA REFERENCE POINT SESSION DOSAGE GIVEN: 9

## 2020-11-09 PROCEDURE — 77280 THER RAD SIMULAJ FIELD SMPL: CPT | Mod: 26 | Performed by: RADIOLOGY

## 2020-11-09 PROCEDURE — 77280 THER RAD SIMULAJ FIELD SMPL: CPT | Performed by: RADIOLOGY

## 2020-11-09 PROCEDURE — 77373 STRTCTC BDY RAD THER TX DLVR: CPT | Performed by: RADIOLOGY

## 2020-11-10 ENCOUNTER — HOSPITAL ENCOUNTER (OUTPATIENT)
Dept: RADIATION ONCOLOGY | Facility: MEDICAL CENTER | Age: 55
End: 2020-11-10
Payer: COMMERCIAL

## 2020-11-10 LAB
CHEMOTHERAPY INFUSION START DATE: NORMAL
CHEMOTHERAPY INFUSION START DATE: NORMAL
CHEMOTHERAPY INFUSION STOP DATE: NORMAL
CHEMOTHERAPY RECORDS: 2700
CHEMOTHERAPY RECORDS: 2700
CHEMOTHERAPY RECORDS: 9
CHEMOTHERAPY RECORDS: 9
CHEMOTHERAPY RECORDS: NORMAL
CHEMOTHERAPY RX CANCER: NORMAL
CHEMOTHERAPY RX CANCER: NORMAL
DATE 1ST CHEMO CANCER: NORMAL
DATE 1ST CHEMO CANCER: NORMAL
RAD ONC ARIA COURSE LAST TREATMENT DATE: NORMAL
RAD ONC ARIA COURSE LAST TREATMENT DATE: NORMAL
RAD ONC ARIA COURSE TREATMENT ELAPSED DAYS: NORMAL
RAD ONC ARIA COURSE TREATMENT ELAPSED DAYS: NORMAL
RAD ONC ARIA REFERENCE POINT DOSAGE GIVEN TO DATE: 27
RAD ONC ARIA REFERENCE POINT DOSAGE GIVEN TO DATE: 27
RAD ONC ARIA REFERENCE POINT DOSAGE GIVEN TO DATE: 31.42
RAD ONC ARIA REFERENCE POINT DOSAGE GIVEN TO DATE: 31.42
RAD ONC ARIA REFERENCE POINT ID: NORMAL
RAD ONC ARIA REFERENCE POINT SESSION DOSAGE GIVEN: 10.47
RAD ONC ARIA REFERENCE POINT SESSION DOSAGE GIVEN: 9

## 2020-11-10 PROCEDURE — 77336 RADIATION PHYSICS CONSULT: CPT | Performed by: RADIOLOGY

## 2020-11-10 PROCEDURE — 77373 STRTCTC BDY RAD THER TX DLVR: CPT | Performed by: RADIOLOGY

## 2020-11-10 PROCEDURE — 77280 THER RAD SIMULAJ FIELD SMPL: CPT | Performed by: RADIOLOGY

## 2020-11-10 PROCEDURE — 77280 THER RAD SIMULAJ FIELD SMPL: CPT | Mod: 26 | Performed by: RADIOLOGY

## 2020-11-16 ENCOUNTER — HOSPITAL ENCOUNTER (OUTPATIENT)
Dept: LAB | Facility: MEDICAL CENTER | Age: 55
End: 2020-11-16
Attending: INTERNAL MEDICINE
Payer: COMMERCIAL

## 2020-11-16 LAB
ALBUMIN SERPL BCP-MCNC: 3.8 G/DL (ref 3.2–4.9)
ALBUMIN/GLOB SERPL: 1.2 G/DL
ALP SERPL-CCNC: 61 U/L (ref 30–99)
ALT SERPL-CCNC: 23 U/L (ref 2–50)
ANION GAP SERPL CALC-SCNC: 11 MMOL/L (ref 7–16)
ANISOCYTOSIS BLD QL SMEAR: ABNORMAL
AST SERPL-CCNC: 21 U/L (ref 12–45)
BASOPHILS # BLD AUTO: 0 % (ref 0–1.8)
BASOPHILS # BLD: 0 K/UL (ref 0–0.12)
BILIRUB SERPL-MCNC: 0.5 MG/DL (ref 0.1–1.5)
BUN SERPL-MCNC: 17 MG/DL (ref 8–22)
CALCIUM SERPL-MCNC: 9.6 MG/DL (ref 8.4–10.2)
CHLORIDE SERPL-SCNC: 106 MMOL/L (ref 96–112)
CO2 SERPL-SCNC: 25 MMOL/L (ref 20–33)
CREAT SERPL-MCNC: 0.97 MG/DL (ref 0.5–1.4)
EOSINOPHIL # BLD AUTO: 0.03 K/UL (ref 0–0.51)
EOSINOPHIL NFR BLD: 1 % (ref 0–6.9)
ERYTHROCYTE [DISTWIDTH] IN BLOOD BY AUTOMATED COUNT: 58.7 FL (ref 35.9–50)
FASTING STATUS PATIENT QL REPORTED: NORMAL
GLOBULIN SER CALC-MCNC: 3.2 G/DL (ref 1.9–3.5)
GLUCOSE SERPL-MCNC: 75 MG/DL (ref 65–99)
HCT VFR BLD AUTO: 34.5 % (ref 37–47)
HGB BLD-MCNC: 11.7 G/DL (ref 12–16)
LYMPHOCYTES # BLD AUTO: 2.48 K/UL (ref 1–4.8)
LYMPHOCYTES NFR BLD: 73 % (ref 22–41)
MACROCYTES BLD QL SMEAR: ABNORMAL
MAGNESIUM SERPL-MCNC: 2.2 MG/DL (ref 1.5–2.5)
MANUAL DIFF BLD: NORMAL
MCH RBC QN AUTO: 36.2 PG (ref 27–33)
MCHC RBC AUTO-ENTMCNC: 33.9 G/DL (ref 33.6–35)
MCV RBC AUTO: 106.8 FL (ref 81.4–97.8)
MONOCYTES # BLD AUTO: 0.03 K/UL (ref 0–0.85)
MONOCYTES NFR BLD AUTO: 1 % (ref 0–13.4)
NEUTROPHILS # BLD AUTO: 0.85 K/UL (ref 2–7.15)
NEUTROPHILS NFR BLD: 25 % (ref 44–72)
NRBC # BLD AUTO: 0 K/UL
NRBC BLD-RTO: 0 /100 WBC
PHOSPHATE SERPL-MCNC: 3.3 MG/DL (ref 2.5–4.5)
PLATELET # BLD AUTO: 128 K/UL (ref 164–446)
PLATELET BLD QL SMEAR: NORMAL
PMV BLD AUTO: 9.3 FL (ref 9–12.9)
POTASSIUM SERPL-SCNC: 3.9 MMOL/L (ref 3.6–5.5)
PROT SERPL-MCNC: 7 G/DL (ref 6–8.2)
RBC # BLD AUTO: 3.23 M/UL (ref 4.2–5.4)
RBC BLD AUTO: PRESENT
SODIUM SERPL-SCNC: 142 MMOL/L (ref 135–145)
WBC # BLD AUTO: 3.4 K/UL (ref 4.8–10.8)

## 2020-11-16 PROCEDURE — 84100 ASSAY OF PHOSPHORUS: CPT

## 2020-11-16 PROCEDURE — 36415 COLL VENOUS BLD VENIPUNCTURE: CPT

## 2020-11-16 PROCEDURE — 85007 BL SMEAR W/DIFF WBC COUNT: CPT

## 2020-11-16 PROCEDURE — 83735 ASSAY OF MAGNESIUM: CPT

## 2020-11-16 PROCEDURE — 85027 COMPLETE CBC AUTOMATED: CPT

## 2020-11-16 PROCEDURE — 86300 IMMUNOASSAY TUMOR CA 15-3: CPT

## 2020-11-16 PROCEDURE — 80053 COMPREHEN METABOLIC PANEL: CPT

## 2020-11-18 LAB — CANCER AG27-29 SERPL-ACNC: 14.3 U/ML (ref 0–40)

## 2021-01-08 ENCOUNTER — HOSPITAL ENCOUNTER (OUTPATIENT)
Dept: LAB | Facility: MEDICAL CENTER | Age: 56
End: 2021-01-08
Attending: INTERNAL MEDICINE
Payer: COMMERCIAL

## 2021-01-08 ENCOUNTER — HOSPITAL ENCOUNTER (OUTPATIENT)
Dept: RADIOLOGY | Facility: MEDICAL CENTER | Age: 56
End: 2021-01-08
Attending: RADIOLOGY
Payer: COMMERCIAL

## 2021-01-08 DIAGNOSIS — C79.31 BRAIN METASTASES: ICD-10-CM

## 2021-01-08 LAB
BASOPHILS # BLD AUTO: 0.9 % (ref 0–1.8)
BASOPHILS # BLD: 0.03 K/UL (ref 0–0.12)
EOSINOPHIL # BLD AUTO: 0.02 K/UL (ref 0–0.51)
EOSINOPHIL NFR BLD: 0.6 % (ref 0–6.9)
ERYTHROCYTE [DISTWIDTH] IN BLOOD BY AUTOMATED COUNT: 56.1 FL (ref 35.9–50)
HCT VFR BLD AUTO: 37 % (ref 37–47)
HGB BLD-MCNC: 12.2 G/DL (ref 12–16)
IMM GRANULOCYTES # BLD AUTO: 0.01 K/UL (ref 0–0.11)
IMM GRANULOCYTES NFR BLD AUTO: 0.3 % (ref 0–0.9)
LYMPHOCYTES # BLD AUTO: 1.96 K/UL (ref 1–4.8)
LYMPHOCYTES NFR BLD: 57 % (ref 22–41)
MCH RBC QN AUTO: 38 PG (ref 27–33)
MCHC RBC AUTO-ENTMCNC: 33 G/DL (ref 33.6–35)
MCV RBC AUTO: 115.3 FL (ref 81.4–97.8)
MONOCYTES # BLD AUTO: 0.14 K/UL (ref 0–0.85)
MONOCYTES NFR BLD AUTO: 4.1 % (ref 0–13.4)
NEUTROPHILS # BLD AUTO: 1.28 K/UL (ref 2–7.15)
NEUTROPHILS NFR BLD: 37.1 % (ref 44–72)
NRBC # BLD AUTO: 0 K/UL
NRBC BLD-RTO: 0 /100 WBC
PLATELET # BLD AUTO: 130 K/UL (ref 164–446)
PMV BLD AUTO: 10.2 FL (ref 9–12.9)
RBC # BLD AUTO: 3.21 M/UL (ref 4.2–5.4)
WBC # BLD AUTO: 3.4 K/UL (ref 4.8–10.8)

## 2021-01-08 PROCEDURE — 83735 ASSAY OF MAGNESIUM: CPT

## 2021-01-08 PROCEDURE — 86300 IMMUNOASSAY TUMOR CA 15-3: CPT

## 2021-01-08 PROCEDURE — 85025 COMPLETE CBC W/AUTO DIFF WBC: CPT

## 2021-01-08 PROCEDURE — 84100 ASSAY OF PHOSPHORUS: CPT

## 2021-01-08 PROCEDURE — 80053 COMPREHEN METABOLIC PANEL: CPT

## 2021-01-08 PROCEDURE — A9576 INJ PROHANCE MULTIPACK: HCPCS | Performed by: RADIOLOGY

## 2021-01-08 PROCEDURE — 700117 HCHG RX CONTRAST REV CODE 255: Performed by: RADIOLOGY

## 2021-01-08 PROCEDURE — 70553 MRI BRAIN STEM W/O & W/DYE: CPT

## 2021-01-08 RX ADMIN — GADOTERIDOL 10 ML: 279.3 INJECTION, SOLUTION INTRAVENOUS at 13:45

## 2021-01-09 LAB
ALBUMIN SERPL BCP-MCNC: 4.4 G/DL (ref 3.2–4.9)
ALBUMIN/GLOB SERPL: 1.5 G/DL
ALP SERPL-CCNC: 58 U/L (ref 30–99)
ALT SERPL-CCNC: 30 U/L (ref 2–50)
ANION GAP SERPL CALC-SCNC: 11 MMOL/L (ref 7–16)
AST SERPL-CCNC: 27 U/L (ref 12–45)
BILIRUB SERPL-MCNC: 0.3 MG/DL (ref 0.1–1.5)
BUN SERPL-MCNC: 13 MG/DL (ref 8–22)
CALCIUM SERPL-MCNC: 9.7 MG/DL (ref 8.5–10.5)
CHLORIDE SERPL-SCNC: 101 MMOL/L (ref 96–112)
CO2 SERPL-SCNC: 25 MMOL/L (ref 20–33)
CREAT SERPL-MCNC: 0.83 MG/DL (ref 0.5–1.4)
GLOBULIN SER CALC-MCNC: 3 G/DL (ref 1.9–3.5)
GLUCOSE SERPL-MCNC: 83 MG/DL (ref 65–99)
MAGNESIUM SERPL-MCNC: 1.9 MG/DL (ref 1.5–2.5)
PHOSPHATE SERPL-MCNC: 3.3 MG/DL (ref 2.5–4.5)
POTASSIUM SERPL-SCNC: 3.9 MMOL/L (ref 3.6–5.5)
PROT SERPL-MCNC: 7.4 G/DL (ref 6–8.2)
SODIUM SERPL-SCNC: 137 MMOL/L (ref 135–145)

## 2021-01-11 ENCOUNTER — HOSPITAL ENCOUNTER (OUTPATIENT)
Dept: RADIOLOGY | Facility: MEDICAL CENTER | Age: 56
End: 2021-01-11
Attending: INTERNAL MEDICINE
Payer: COMMERCIAL

## 2021-01-11 ENCOUNTER — HOSPITAL ENCOUNTER (OUTPATIENT)
Dept: RADIATION ONCOLOGY | Facility: MEDICAL CENTER | Age: 56
End: 2021-01-31
Attending: RADIOLOGY
Payer: COMMERCIAL

## 2021-01-11 VITALS
HEIGHT: 65 IN | BODY MASS INDEX: 24.06 KG/M2 | OXYGEN SATURATION: 98 % | SYSTOLIC BLOOD PRESSURE: 139 MMHG | TEMPERATURE: 97.4 F | WEIGHT: 144.4 LBS | DIASTOLIC BLOOD PRESSURE: 80 MMHG | HEART RATE: 84 BPM

## 2021-01-11 DIAGNOSIS — C79.31 BRAIN METASTASES: ICD-10-CM

## 2021-01-11 DIAGNOSIS — C50.412 MALIGNANT NEOPLASM OF UPPER-OUTER QUADRANT OF LEFT FEMALE BREAST, UNSPECIFIED ESTROGEN RECEPTOR STATUS (HCC): ICD-10-CM

## 2021-01-11 LAB — CANCER AG27-29 SERPL-ACNC: 17.2 U/ML (ref 0–40)

## 2021-01-11 PROCEDURE — A9503 TC99M MEDRONATE: HCPCS

## 2021-01-11 PROCEDURE — 99212 OFFICE O/P EST SF 10 MIN: CPT | Performed by: RADIOLOGY

## 2021-01-11 ASSESSMENT — FIBROSIS 4 INDEX: FIB4 SCORE: 2.16

## 2021-01-11 ASSESSMENT — PAIN SCALES - GENERAL: PAINLEVEL: NO PAIN

## 2021-01-11 NOTE — NON-PROVIDER
"Patient was seen today in clinic with Dr. Darnell for follow up.  Vitals signs and weight were obtained and pain assessment was completed.  Allergies and medications were reviewed with the patient.  Review of systems completed.     Vitals/Pain:  Vitals:    01/11/21 1424   BP: 139/80   BP Location: Right arm   Patient Position: Sitting   BP Cuff Size: Adult   Pulse: 84   Temp: 36.3 °C (97.4 °F)   TempSrc: Temporal   SpO2: 98%   Weight: 65.5 kg (144 lb 6.4 oz)   Height: 1.651 m (5' 5\")   Pain Score: No pain        Allergies:   Patient has no known allergies.    Current Medications:  Current Outpatient Medications   Medication Sig Dispense Refill   • Denosumab (XGEVA SC) Inject  as instructed.     • VERZENIO 150 MG Tab Take 150 mg by mouth 2 Times a Day.     • Fulvestrant (FASLODEX IM) by Intramuscular route.     • atorvastatin (LIPITOR) 80 MG tablet Take 80 mg by mouth every day.     • CALCIUM PO Take 2,000 mg by mouth every day.     • Ivermectin 1 % Cream 1 Application by Apply externally route every day. (Patient not taking: Reported on 8/21/2019) 1 Tube 3     No current facility-administered medications for this encounter.          PCP:  Pcp Pt States None        Suzette Meza, Med Ass't    "

## 2021-01-11 NOTE — PROGRESS NOTES
RADIATION ONCOLOGY FOLLOW-UP    DATE OF SERVICE: 1/11/2021    IDENTIFICATION:   A 55 y.o. 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  She had previously been treated to a left occipital lesion following resection and stereotactic radiotherapy with CyberKnife in 2017.Now status post stereotactic radiosurgery to multiple lesions complete 11/10/2020.    Currently on Verzenio and letrozole.Here to review the results of her recent MRI scan.     HISTORY OF PRESENT ILLNESS:   Since last seen she has been doing great has no new symptoms.  MRI scan of the brain 1/8/2021 shows Since last seen she has been doing great has no new symptoms.  MRI scan of the brain 1/8/2021 shows Reportedly no new lesions there might be a slight increase in 2 of the larger lesions 1 in the right operculum and the left occipital lobe.    CURRENT MEDICATIONS:  Current Outpatient Medications   Medication Sig Dispense Refill   • Denosumab (XGEVA SC) Inject  as instructed.     • VERZENIO 150 MG Tab Take 150 mg by mouth 2 Times a Day.     • Fulvestrant (FASLODEX IM) by Intramuscular route.     • atorvastatin (LIPITOR) 80 MG tablet Take 80 mg by mouth every day.     • CALCIUM PO Take 2,000 mg by mouth every day.     • Ivermectin 1 % Cream 1 Application by Apply externally route every day. (Patient not taking: Reported on 8/21/2019) 1 Tube 3     No current facility-administered medications for this encounter.        ALLERGIES:  Patient has no known allergies.    FAMILY HISTORY:    Family History   Problem Relation Age of Onset   • Cancer Father         Prostate cancer/colon cancer   [unfilled]        SOCIAL HISTORY:     reports that she has never smoked. She has never used smokeless tobacco. She reports that she does not drink alcohol or use  "drugs.      REVIEW OF SYSTEMS: Is significant for Nothing new  The rest of the review of systems has been reviewed by me and is documented in the nursing note in [facility] dated 1/11/2021    PHYSICAL EXAM:     ECOG PERFORMANCE STATUS:  0= Fully active, able to carry on all pre-disease performance without restriction.   Vitals:    01/11/21 1424   BP: 139/80   BP Location: Right arm   Patient Position: Sitting   BP Cuff Size: Adult   Pulse: 84   Temp: 36.3 °C (97.4 °F)   TempSrc: Temporal   SpO2: 98%   Weight: 65.5 kg (144 lb 6.4 oz)   Height: 1.651 m (5' 5\")   Pain Score: No pain        GENERAL: Well-appearing alert and oriented x3 in no apparent distress.  HEENT:  Pupils are equal, round, and reactive to light.  Extraocular muscles   are intact. Sclerae nonicteric.  Conjunctivae pink.  Oral cavity, tongue   protrudes midline.   NECK: No palpable nodes preauricular neck supra vocal or axillary regions  LUNGS:  Clear to ascultation   HEART:  Regular rate and rhythm.  No murmur appreciated  ABDOMEN:  Soft. No evidence of hepatosplenomegaly.  Positive bowel sounds.  EXTREMITIES:  Without Edema.  NEUROLOGIC:  Cranial nerves II through XII were intact. Normal stance and gait motor and sensory grossly within normal limits          IMPRESSION:    A 55 y.o. 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  She had previously been treated to a left occipital lesion following resection and stereotactic radiotherapy with CyberKnife in 2017.Status post stereotactic radiotherapy to multiple lesions complete 11/10/2020.  Currently on Verzenio and letrozole.On my review with the MRI scan I feel that there is at least 2 additional lesions that are extremely small possibly 1 mm in size.  Possible those other 2 lesions that were mentioned on " MRI could be increased although it would be minimal.    RECOMMENDATIONS:   My recommendation is to repeat the scan in 6 weeks and make a determination on what we want to proceed with.  I also spoken with Dr. Granado who was in agreement with this.  She does say Verzenio does not really past the blood-brain barrier unfortunately.    I will see the patient sooner as needed.      Thank you for the opportunity to participate in her care.  If any questions or comments, please do not hesitate in calling.      Please note that this dictation was created using voice recognition software. I have made every reasonable attempt to correct obvious errors, but I expect that there are errors of grammar and possibly content that I did not discover before finalizing the note.

## 2021-02-18 ENCOUNTER — HOSPITAL ENCOUNTER (OUTPATIENT)
Dept: RADIOLOGY | Facility: MEDICAL CENTER | Age: 56
End: 2021-02-18
Attending: RADIOLOGY
Payer: COMMERCIAL

## 2021-02-18 DIAGNOSIS — C79.31 BRAIN METASTASES: ICD-10-CM

## 2021-02-18 PROCEDURE — 70553 MRI BRAIN STEM W/O & W/DYE: CPT

## 2021-02-18 PROCEDURE — A9576 INJ PROHANCE MULTIPACK: HCPCS | Performed by: RADIOLOGY

## 2021-02-18 PROCEDURE — 700117 HCHG RX CONTRAST REV CODE 255: Performed by: RADIOLOGY

## 2021-02-18 RX ADMIN — GADOTERIDOL 13 ML: 279.3 INJECTION, SOLUTION INTRAVENOUS at 15:28

## 2021-02-26 ENCOUNTER — TELEPHONE (OUTPATIENT)
Dept: RADIATION ONCOLOGY | Facility: MEDICAL CENTER | Age: 56
End: 2021-02-26

## 2021-03-02 ENCOUNTER — PRE-ADMISSION TESTING (OUTPATIENT)
Dept: ADMISSIONS | Facility: MEDICAL CENTER | Age: 56
DRG: 027 | End: 2021-03-02
Attending: NEUROLOGICAL SURGERY
Payer: COMMERCIAL

## 2021-03-02 DIAGNOSIS — Z01.812 PRE-OPERATIVE LABORATORY EXAMINATION: ICD-10-CM

## 2021-03-02 DIAGNOSIS — Z01.810 PRE-OPERATIVE CARDIOVASCULAR EXAMINATION: ICD-10-CM

## 2021-03-02 LAB
ABO GROUP BLD: NORMAL
ANION GAP SERPL CALC-SCNC: 9 MMOL/L (ref 7–16)
APPEARANCE UR: CLEAR
APTT PPP: 24.9 SEC (ref 24.7–36)
BASOPHILS # BLD AUTO: 1.1 % (ref 0–1.8)
BASOPHILS # BLD: 0.03 K/UL (ref 0–0.12)
BILIRUB UR QL STRIP.AUTO: NEGATIVE
BLD GP AB SCN SERPL QL: NORMAL
BUN SERPL-MCNC: 18 MG/DL (ref 8–22)
CALCIUM SERPL-MCNC: 9.3 MG/DL (ref 8.5–10.5)
CHLORIDE SERPL-SCNC: 108 MMOL/L (ref 96–112)
CO2 SERPL-SCNC: 26 MMOL/L (ref 20–33)
COLOR UR: YELLOW
CREAT SERPL-MCNC: 0.91 MG/DL (ref 0.5–1.4)
EOSINOPHIL # BLD AUTO: 0.02 K/UL (ref 0–0.51)
EOSINOPHIL NFR BLD: 0.7 % (ref 0–6.9)
ERYTHROCYTE [DISTWIDTH] IN BLOOD BY AUTOMATED COUNT: 56.9 FL (ref 35.9–50)
GLUCOSE SERPL-MCNC: 89 MG/DL (ref 65–99)
GLUCOSE UR STRIP.AUTO-MCNC: NEGATIVE MG/DL
HCT VFR BLD AUTO: 33.5 % (ref 37–47)
HGB BLD-MCNC: 11.3 G/DL (ref 12–16)
IMM GRANULOCYTES # BLD AUTO: 0.01 K/UL (ref 0–0.11)
IMM GRANULOCYTES NFR BLD AUTO: 0.4 % (ref 0–0.9)
INR PPP: 0.91 (ref 0.87–1.13)
KETONES UR STRIP.AUTO-MCNC: ABNORMAL MG/DL
LEUKOCYTE ESTERASE UR QL STRIP.AUTO: NEGATIVE
LYMPHOCYTES # BLD AUTO: 1.36 K/UL (ref 1–4.8)
LYMPHOCYTES NFR BLD: 50 % (ref 22–41)
MCH RBC QN AUTO: 38.4 PG (ref 27–33)
MCHC RBC AUTO-ENTMCNC: 33.7 G/DL (ref 33.6–35)
MCV RBC AUTO: 113.9 FL (ref 81.4–97.8)
MICRO URNS: ABNORMAL
MONOCYTES # BLD AUTO: 0.09 K/UL (ref 0–0.85)
MONOCYTES NFR BLD AUTO: 3.3 % (ref 0–13.4)
NEUTROPHILS # BLD AUTO: 1.21 K/UL (ref 2–7.15)
NEUTROPHILS NFR BLD: 44.5 % (ref 44–72)
NITRITE UR QL STRIP.AUTO: NEGATIVE
NRBC # BLD AUTO: 0 K/UL
NRBC BLD-RTO: 0 /100 WBC
PH UR STRIP.AUTO: 5 [PH] (ref 5–8)
PLATELET # BLD AUTO: 98 K/UL (ref 164–446)
PMV BLD AUTO: 10.4 FL (ref 9–12.9)
POTASSIUM SERPL-SCNC: 3.8 MMOL/L (ref 3.6–5.5)
PROT UR QL STRIP: NEGATIVE MG/DL
PROTHROMBIN TIME: 12.5 SEC (ref 12–14.6)
RBC # BLD AUTO: 2.94 M/UL (ref 4.2–5.4)
RBC UR QL AUTO: NEGATIVE
RH BLD: NORMAL
SODIUM SERPL-SCNC: 143 MMOL/L (ref 135–145)
SP GR UR STRIP.AUTO: 1.03
UROBILINOGEN UR STRIP.AUTO-MCNC: 0.2 MG/DL
WBC # BLD AUTO: 2.7 K/UL (ref 4.8–10.8)

## 2021-03-02 PROCEDURE — 86901 BLOOD TYPING SEROLOGIC RH(D): CPT

## 2021-03-02 PROCEDURE — 85610 PROTHROMBIN TIME: CPT

## 2021-03-02 PROCEDURE — 85730 THROMBOPLASTIN TIME PARTIAL: CPT

## 2021-03-02 PROCEDURE — 93005 ELECTROCARDIOGRAM TRACING: CPT

## 2021-03-02 PROCEDURE — 81003 URINALYSIS AUTO W/O SCOPE: CPT

## 2021-03-02 PROCEDURE — 36415 COLL VENOUS BLD VENIPUNCTURE: CPT

## 2021-03-02 PROCEDURE — 86850 RBC ANTIBODY SCREEN: CPT

## 2021-03-02 PROCEDURE — 86900 BLOOD TYPING SEROLOGIC ABO: CPT

## 2021-03-02 PROCEDURE — 85025 COMPLETE CBC W/AUTO DIFF WBC: CPT

## 2021-03-02 PROCEDURE — 80048 BASIC METABOLIC PNL TOTAL CA: CPT

## 2021-03-02 ASSESSMENT — FIBROSIS 4 INDEX: FIB4 SCORE: 2.16

## 2021-03-03 ENCOUNTER — ANESTHESIA EVENT (OUTPATIENT)
Dept: SURGERY | Facility: MEDICAL CENTER | Age: 56
DRG: 027 | End: 2021-03-03
Payer: COMMERCIAL

## 2021-03-03 ENCOUNTER — ANESTHESIA (OUTPATIENT)
Dept: SURGERY | Facility: MEDICAL CENTER | Age: 56
DRG: 027 | End: 2021-03-03
Payer: COMMERCIAL

## 2021-03-03 ENCOUNTER — APPOINTMENT (OUTPATIENT)
Dept: RADIOLOGY | Facility: MEDICAL CENTER | Age: 56
DRG: 027 | End: 2021-03-03
Attending: NEUROLOGICAL SURGERY
Payer: COMMERCIAL

## 2021-03-03 ENCOUNTER — HOSPITAL ENCOUNTER (INPATIENT)
Facility: MEDICAL CENTER | Age: 56
LOS: 2 days | DRG: 027 | End: 2021-03-05
Attending: NEUROLOGICAL SURGERY | Admitting: NEUROLOGICAL SURGERY
Payer: COMMERCIAL

## 2021-03-03 DIAGNOSIS — G93.89 BRAIN MASS: ICD-10-CM

## 2021-03-03 LAB
ABO GROUP BLD: NORMAL
BLD GP AB SCN SERPL QL: NORMAL
EKG IMPRESSION: NORMAL
PATHOLOGY CONSULT NOTE: NORMAL
RH BLD: NORMAL

## 2021-03-03 PROCEDURE — 700111 HCHG RX REV CODE 636 W/ 250 OVERRIDE (IP): Performed by: NEUROLOGICAL SURGERY

## 2021-03-03 PROCEDURE — 700111 HCHG RX REV CODE 636 W/ 250 OVERRIDE (IP): Performed by: NURSE PRACTITIONER

## 2021-03-03 PROCEDURE — 70553 MRI BRAIN STEM W/O & W/DYE: CPT

## 2021-03-03 PROCEDURE — 00B70ZZ EXCISION OF CEREBRAL HEMISPHERE, OPEN APPROACH: ICD-10-PCS | Performed by: NEUROLOGICAL SURGERY

## 2021-03-03 PROCEDURE — 160009 HCHG ANES TIME/MIN: Performed by: NEUROLOGICAL SURGERY

## 2021-03-03 PROCEDURE — 160048 HCHG OR STATISTICAL LEVEL 1-5: Performed by: NEUROLOGICAL SURGERY

## 2021-03-03 PROCEDURE — 700111 HCHG RX REV CODE 636 W/ 250 OVERRIDE (IP): Performed by: STUDENT IN AN ORGANIZED HEALTH CARE EDUCATION/TRAINING PROGRAM

## 2021-03-03 PROCEDURE — 700105 HCHG RX REV CODE 258: Performed by: NEUROLOGICAL SURGERY

## 2021-03-03 PROCEDURE — A9270 NON-COVERED ITEM OR SERVICE: HCPCS | Performed by: STUDENT IN AN ORGANIZED HEALTH CARE EDUCATION/TRAINING PROGRAM

## 2021-03-03 PROCEDURE — 302135 SEQUENTIAL COMPRESSION MACHINE: Performed by: NEUROLOGICAL SURGERY

## 2021-03-03 PROCEDURE — 700102 HCHG RX REV CODE 250 W/ 637 OVERRIDE(OP): Performed by: STUDENT IN AN ORGANIZED HEALTH CARE EDUCATION/TRAINING PROGRAM

## 2021-03-03 PROCEDURE — 86850 RBC ANTIBODY SCREEN: CPT

## 2021-03-03 PROCEDURE — 700101 HCHG RX REV CODE 250: Performed by: STUDENT IN AN ORGANIZED HEALTH CARE EDUCATION/TRAINING PROGRAM

## 2021-03-03 PROCEDURE — 700102 HCHG RX REV CODE 250 W/ 637 OVERRIDE(OP): Performed by: NURSE PRACTITIONER

## 2021-03-03 PROCEDURE — 160035 HCHG PACU - 1ST 60 MINS PHASE I: Performed by: NEUROLOGICAL SURGERY

## 2021-03-03 PROCEDURE — 500331 HCHG COTTONOID, SURG PATTIE: Performed by: NEUROLOGICAL SURGERY

## 2021-03-03 PROCEDURE — 110454 HCHG SHELL REV 250: Performed by: NEUROLOGICAL SURGERY

## 2021-03-03 PROCEDURE — 86901 BLOOD TYPING SEROLOGIC RH(D): CPT

## 2021-03-03 PROCEDURE — C1713 ANCHOR/SCREW BN/BN,TIS/BN: HCPCS | Performed by: NEUROLOGICAL SURGERY

## 2021-03-03 PROCEDURE — A9270 NON-COVERED ITEM OR SERVICE: HCPCS | Performed by: NEUROLOGICAL SURGERY

## 2021-03-03 PROCEDURE — 700117 HCHG RX CONTRAST REV CODE 255: Performed by: NEUROLOGICAL SURGERY

## 2021-03-03 PROCEDURE — A9576 INJ PROHANCE MULTIPACK: HCPCS | Performed by: NEUROLOGICAL SURGERY

## 2021-03-03 PROCEDURE — 88307 TISSUE EXAM BY PATHOLOGIST: CPT

## 2021-03-03 PROCEDURE — 93010 ELECTROCARDIOGRAM REPORT: CPT | Performed by: INTERNAL MEDICINE

## 2021-03-03 PROCEDURE — 700101 HCHG RX REV CODE 250: Performed by: NEUROLOGICAL SURGERY

## 2021-03-03 PROCEDURE — 4A10X4G MONITORING OF CENTRAL NERVOUS ELECTRICAL ACTIVITY, INTRAOPERATIVE, EXTERNAL APPROACH: ICD-10-PCS | Performed by: NEUROLOGICAL SURGERY

## 2021-03-03 PROCEDURE — 770022 HCHG ROOM/CARE - ICU (200)

## 2021-03-03 PROCEDURE — 88360 TUMOR IMMUNOHISTOCHEM/MANUAL: CPT

## 2021-03-03 PROCEDURE — 501838 HCHG SUTURE GENERAL: Performed by: NEUROLOGICAL SURGERY

## 2021-03-03 PROCEDURE — 160031 HCHG SURGERY MINUTES - 1ST 30 MINS LEVEL 5: Performed by: NEUROLOGICAL SURGERY

## 2021-03-03 PROCEDURE — 160036 HCHG PACU - EA ADDL 30 MINS PHASE I: Performed by: NEUROLOGICAL SURGERY

## 2021-03-03 PROCEDURE — 88331 PATH CONSLTJ SURG 1 BLK 1SPC: CPT

## 2021-03-03 PROCEDURE — 160002 HCHG RECOVERY MINUTES (STAT): Performed by: NEUROLOGICAL SURGERY

## 2021-03-03 PROCEDURE — 160042 HCHG SURGERY MINUTES - EA ADDL 1 MIN LEVEL 5: Performed by: NEUROLOGICAL SURGERY

## 2021-03-03 PROCEDURE — 500889 HCHG PACK, NEURO: Performed by: NEUROLOGICAL SURGERY

## 2021-03-03 PROCEDURE — 700101 HCHG RX REV CODE 250: Performed by: NURSE PRACTITIONER

## 2021-03-03 PROCEDURE — 700105 HCHG RX REV CODE 258: Performed by: STUDENT IN AN ORGANIZED HEALTH CARE EDUCATION/TRAINING PROGRAM

## 2021-03-03 PROCEDURE — 88342 IMHCHEM/IMCYTCHM 1ST ANTB: CPT

## 2021-03-03 PROCEDURE — C1725 CATH, TRANSLUMIN NON-LASER: HCPCS | Performed by: NEUROLOGICAL SURGERY

## 2021-03-03 PROCEDURE — A9270 NON-COVERED ITEM OR SERVICE: HCPCS | Performed by: NURSE PRACTITIONER

## 2021-03-03 PROCEDURE — 500075 HCHG BLADE, CLIPPER NEURO: Performed by: NEUROLOGICAL SURGERY

## 2021-03-03 DEVICE — PLATE NC BURR HOLE COVER 10MM (6NCX6=36): Type: IMPLANTABLE DEVICE | Site: CRANIAL | Status: FUNCTIONAL

## 2021-03-03 DEVICE — SCREW STRYK NC 1.5X5MM (6NCX40=240) (80EA/PK) CONSIGNED QTY 240 PRE-LOAD: Type: IMPLANTABLE DEVICE | Site: CRANIAL | Status: FUNCTIONAL

## 2021-03-03 DEVICE — GRAFT DURAMATRIX ONLAY PLUS 3IN X 3IN OR 7.5CM X 7.5CM: Type: IMPLANTABLE DEVICE | Site: CRANIAL | Status: FUNCTIONAL

## 2021-03-03 DEVICE — PLATE NC BOX 2X2 SMALL (6NCX4=24): Type: IMPLANTABLE DEVICE | Site: CRANIAL | Status: FUNCTIONAL

## 2021-03-03 RX ORDER — CEFAZOLIN SODIUM 2 G/100ML
2 INJECTION, SOLUTION INTRAVENOUS EVERY 8 HOURS
Status: COMPLETED | OUTPATIENT
Start: 2021-03-03 | End: 2021-03-04

## 2021-03-03 RX ORDER — ROCURONIUM BROMIDE 10 MG/ML
INJECTION, SOLUTION INTRAVENOUS PRN
Status: DISCONTINUED | OUTPATIENT
Start: 2021-03-03 | End: 2021-03-03 | Stop reason: SURG

## 2021-03-03 RX ORDER — HYDROMORPHONE HYDROCHLORIDE 1 MG/ML
0.5 INJECTION, SOLUTION INTRAMUSCULAR; INTRAVENOUS; SUBCUTANEOUS
Status: DISCONTINUED | OUTPATIENT
Start: 2021-03-03 | End: 2021-03-04

## 2021-03-03 RX ORDER — HYDROMORPHONE HYDROCHLORIDE 1 MG/ML
0.1 INJECTION, SOLUTION INTRAMUSCULAR; INTRAVENOUS; SUBCUTANEOUS
Status: DISCONTINUED | OUTPATIENT
Start: 2021-03-03 | End: 2021-03-03 | Stop reason: HOSPADM

## 2021-03-03 RX ORDER — SODIUM CHLORIDE, SODIUM LACTATE, POTASSIUM CHLORIDE, CALCIUM CHLORIDE 600; 310; 30; 20 MG/100ML; MG/100ML; MG/100ML; MG/100ML
INJECTION, SOLUTION INTRAVENOUS
Status: DISCONTINUED | OUTPATIENT
Start: 2021-03-03 | End: 2021-03-03 | Stop reason: SURG

## 2021-03-03 RX ORDER — ONDANSETRON 2 MG/ML
4 INJECTION INTRAMUSCULAR; INTRAVENOUS EVERY 4 HOURS PRN
Status: DISCONTINUED | OUTPATIENT
Start: 2021-03-03 | End: 2021-03-05 | Stop reason: HOSPADM

## 2021-03-03 RX ORDER — REMIFENTANIL HYDROCHLORIDE 1 MG/ML
INJECTION, POWDER, LYOPHILIZED, FOR SOLUTION INTRAVENOUS PRN
Status: DISCONTINUED | OUTPATIENT
Start: 2021-03-03 | End: 2021-03-03 | Stop reason: SURG

## 2021-03-03 RX ORDER — DEXAMETHASONE SODIUM PHOSPHATE 4 MG/ML
INJECTION, SOLUTION INTRA-ARTICULAR; INTRALESIONAL; INTRAMUSCULAR; INTRAVENOUS; SOFT TISSUE PRN
Status: DISCONTINUED | OUTPATIENT
Start: 2021-03-03 | End: 2021-03-03 | Stop reason: SURG

## 2021-03-03 RX ORDER — CEFAZOLIN SODIUM 1 G/3ML
INJECTION, POWDER, FOR SOLUTION INTRAMUSCULAR; INTRAVENOUS
Status: DISCONTINUED | OUTPATIENT
Start: 2021-03-03 | End: 2021-03-03 | Stop reason: HOSPADM

## 2021-03-03 RX ORDER — OXYCODONE HYDROCHLORIDE 5 MG/1
5 TABLET ORAL
Status: DISCONTINUED | OUTPATIENT
Start: 2021-03-03 | End: 2021-03-05 | Stop reason: HOSPADM

## 2021-03-03 RX ORDER — ACETAMINOPHEN 500 MG
1000 TABLET ORAL ONCE
Status: COMPLETED | OUTPATIENT
Start: 2021-03-03 | End: 2021-03-03

## 2021-03-03 RX ORDER — DOCUSATE SODIUM 100 MG/1
100 CAPSULE, LIQUID FILLED ORAL 2 TIMES DAILY
Status: DISCONTINUED | OUTPATIENT
Start: 2021-03-03 | End: 2021-03-05 | Stop reason: HOSPADM

## 2021-03-03 RX ORDER — OXYCODONE HCL 5 MG/5 ML
5 SOLUTION, ORAL ORAL
Status: COMPLETED | OUTPATIENT
Start: 2021-03-03 | End: 2021-03-03

## 2021-03-03 RX ORDER — AMOXICILLIN 250 MG
1 CAPSULE ORAL
Status: DISCONTINUED | OUTPATIENT
Start: 2021-03-03 | End: 2021-03-05 | Stop reason: HOSPADM

## 2021-03-03 RX ORDER — CEFAZOLIN SODIUM 1 G/3ML
INJECTION, POWDER, FOR SOLUTION INTRAMUSCULAR; INTRAVENOUS PRN
Status: DISCONTINUED | OUTPATIENT
Start: 2021-03-03 | End: 2021-03-03 | Stop reason: SURG

## 2021-03-03 RX ORDER — CLONIDINE HYDROCHLORIDE 0.1 MG/1
0.1 TABLET ORAL EVERY 4 HOURS PRN
Status: DISCONTINUED | OUTPATIENT
Start: 2021-03-03 | End: 2021-03-05 | Stop reason: HOSPADM

## 2021-03-03 RX ORDER — HYDRALAZINE HYDROCHLORIDE 20 MG/ML
10 INJECTION INTRAMUSCULAR; INTRAVENOUS
Status: DISCONTINUED | OUTPATIENT
Start: 2021-03-03 | End: 2021-03-05 | Stop reason: HOSPADM

## 2021-03-03 RX ORDER — HYDROMORPHONE HYDROCHLORIDE 1 MG/ML
0.4 INJECTION, SOLUTION INTRAMUSCULAR; INTRAVENOUS; SUBCUTANEOUS
Status: DISCONTINUED | OUTPATIENT
Start: 2021-03-03 | End: 2021-03-03 | Stop reason: HOSPADM

## 2021-03-03 RX ORDER — AMOXICILLIN 250 MG
1 CAPSULE ORAL NIGHTLY
Status: DISCONTINUED | OUTPATIENT
Start: 2021-03-03 | End: 2021-03-05 | Stop reason: HOSPADM

## 2021-03-03 RX ORDER — SODIUM CHLORIDE, SODIUM LACTATE, POTASSIUM CHLORIDE, CALCIUM CHLORIDE 600; 310; 30; 20 MG/100ML; MG/100ML; MG/100ML; MG/100ML
INJECTION, SOLUTION INTRAVENOUS CONTINUOUS
Status: DISCONTINUED | OUTPATIENT
Start: 2021-03-03 | End: 2021-03-03 | Stop reason: HOSPADM

## 2021-03-03 RX ORDER — DIPHENHYDRAMINE HYDROCHLORIDE 50 MG/ML
25 INJECTION INTRAMUSCULAR; INTRAVENOUS EVERY 6 HOURS PRN
Status: DISCONTINUED | OUTPATIENT
Start: 2021-03-03 | End: 2021-03-05 | Stop reason: HOSPADM

## 2021-03-03 RX ORDER — ATORVASTATIN CALCIUM 80 MG/1
80 TABLET, FILM COATED ORAL DAILY
Status: DISCONTINUED | OUTPATIENT
Start: 2021-03-03 | End: 2021-03-05 | Stop reason: HOSPADM

## 2021-03-03 RX ORDER — DEXAMETHASONE SODIUM PHOSPHATE 4 MG/ML
4 INJECTION, SOLUTION INTRA-ARTICULAR; INTRALESIONAL; INTRAMUSCULAR; INTRAVENOUS; SOFT TISSUE
Status: COMPLETED | OUTPATIENT
Start: 2021-03-03 | End: 2021-03-03

## 2021-03-03 RX ORDER — DEXAMETHASONE SODIUM PHOSPHATE 4 MG/ML
10 INJECTION, SOLUTION INTRA-ARTICULAR; INTRALESIONAL; INTRAMUSCULAR; INTRAVENOUS; SOFT TISSUE EVERY 6 HOURS
Status: DISCONTINUED | OUTPATIENT
Start: 2021-03-03 | End: 2021-03-04

## 2021-03-03 RX ORDER — LABETALOL HYDROCHLORIDE 5 MG/ML
10 INJECTION, SOLUTION INTRAVENOUS
Status: DISCONTINUED | OUTPATIENT
Start: 2021-03-03 | End: 2021-03-05 | Stop reason: HOSPADM

## 2021-03-03 RX ORDER — BUPIVACAINE HYDROCHLORIDE AND EPINEPHRINE 5; 5 MG/ML; UG/ML
INJECTION, SOLUTION EPIDURAL; INTRACAUDAL; PERINEURAL
Status: DISCONTINUED | OUTPATIENT
Start: 2021-03-03 | End: 2021-03-03 | Stop reason: HOSPADM

## 2021-03-03 RX ORDER — MIDAZOLAM HYDROCHLORIDE 1 MG/ML
INJECTION INTRAMUSCULAR; INTRAVENOUS PRN
Status: DISCONTINUED | OUTPATIENT
Start: 2021-03-03 | End: 2021-03-03 | Stop reason: SURG

## 2021-03-03 RX ORDER — MEPERIDINE HYDROCHLORIDE 25 MG/ML
12.5 INJECTION INTRAMUSCULAR; INTRAVENOUS; SUBCUTANEOUS
Status: DISCONTINUED | OUTPATIENT
Start: 2021-03-03 | End: 2021-03-03 | Stop reason: HOSPADM

## 2021-03-03 RX ORDER — HYDROMORPHONE HYDROCHLORIDE 1 MG/ML
0.2 INJECTION, SOLUTION INTRAMUSCULAR; INTRAVENOUS; SUBCUTANEOUS
Status: DISCONTINUED | OUTPATIENT
Start: 2021-03-03 | End: 2021-03-03 | Stop reason: HOSPADM

## 2021-03-03 RX ORDER — OXYCODONE HYDROCHLORIDE 10 MG/1
10 TABLET ORAL
Status: DISCONTINUED | OUTPATIENT
Start: 2021-03-03 | End: 2021-03-05 | Stop reason: HOSPADM

## 2021-03-03 RX ORDER — SCOLOPAMINE TRANSDERMAL SYSTEM 1 MG/1
1 PATCH, EXTENDED RELEASE TRANSDERMAL
Status: DISCONTINUED | OUTPATIENT
Start: 2021-03-03 | End: 2021-03-05 | Stop reason: HOSPADM

## 2021-03-03 RX ORDER — DIPHENHYDRAMINE HYDROCHLORIDE 50 MG/ML
12.5 INJECTION INTRAMUSCULAR; INTRAVENOUS
Status: DISCONTINUED | OUTPATIENT
Start: 2021-03-03 | End: 2021-03-03 | Stop reason: HOSPADM

## 2021-03-03 RX ORDER — MAGNESIUM HYDROXIDE 1200 MG/15ML
LIQUID ORAL
Status: COMPLETED | OUTPATIENT
Start: 2021-03-03 | End: 2021-03-03

## 2021-03-03 RX ORDER — ONDANSETRON 2 MG/ML
INJECTION INTRAMUSCULAR; INTRAVENOUS PRN
Status: DISCONTINUED | OUTPATIENT
Start: 2021-03-03 | End: 2021-03-03 | Stop reason: SURG

## 2021-03-03 RX ORDER — LIDOCAINE HYDROCHLORIDE 20 MG/ML
INJECTION, SOLUTION EPIDURAL; INFILTRATION; INTRACAUDAL; PERINEURAL PRN
Status: DISCONTINUED | OUTPATIENT
Start: 2021-03-03 | End: 2021-03-03 | Stop reason: SURG

## 2021-03-03 RX ORDER — SODIUM CHLORIDE AND POTASSIUM CHLORIDE 150; 900 MG/100ML; MG/100ML
INJECTION, SOLUTION INTRAVENOUS CONTINUOUS
Status: DISCONTINUED | OUTPATIENT
Start: 2021-03-03 | End: 2021-03-05 | Stop reason: HOSPADM

## 2021-03-03 RX ORDER — ENEMA 19; 7 G/133ML; G/133ML
1 ENEMA RECTAL
Status: DISCONTINUED | OUTPATIENT
Start: 2021-03-03 | End: 2021-03-05 | Stop reason: HOSPADM

## 2021-03-03 RX ORDER — PHENYLEPHRINE HCL IN 0.9% NACL 0.5 MG/5ML
SYRINGE (ML) INTRAVENOUS PRN
Status: DISCONTINUED | OUTPATIENT
Start: 2021-03-03 | End: 2021-03-03 | Stop reason: SURG

## 2021-03-03 RX ORDER — HALOPERIDOL 5 MG/ML
1 INJECTION INTRAMUSCULAR
Status: DISCONTINUED | OUTPATIENT
Start: 2021-03-03 | End: 2021-03-03 | Stop reason: HOSPADM

## 2021-03-03 RX ORDER — SODIUM CHLORIDE, SODIUM LACTATE, POTASSIUM CHLORIDE, CALCIUM CHLORIDE 600; 310; 30; 20 MG/100ML; MG/100ML; MG/100ML; MG/100ML
INJECTION, SOLUTION INTRAVENOUS CONTINUOUS
Status: ACTIVE | OUTPATIENT
Start: 2021-03-03 | End: 2021-03-03

## 2021-03-03 RX ORDER — LEVETIRACETAM 5 MG/ML
500 INJECTION INTRAVASCULAR EVERY 12 HOURS
Status: DISCONTINUED | OUTPATIENT
Start: 2021-03-03 | End: 2021-03-04

## 2021-03-03 RX ORDER — OXYCODONE HCL 5 MG/5 ML
10 SOLUTION, ORAL ORAL
Status: COMPLETED | OUTPATIENT
Start: 2021-03-03 | End: 2021-03-03

## 2021-03-03 RX ORDER — ONDANSETRON 2 MG/ML
4 INJECTION INTRAMUSCULAR; INTRAVENOUS
Status: COMPLETED | OUTPATIENT
Start: 2021-03-03 | End: 2021-03-03

## 2021-03-03 RX ORDER — LEVETIRACETAM 500 MG/5ML
INJECTION, SOLUTION, CONCENTRATE INTRAVENOUS PRN
Status: DISCONTINUED | OUTPATIENT
Start: 2021-03-03 | End: 2021-03-03 | Stop reason: SURG

## 2021-03-03 RX ORDER — POLYETHYLENE GLYCOL 3350 17 G/17G
1 POWDER, FOR SOLUTION ORAL 2 TIMES DAILY PRN
Status: DISCONTINUED | OUTPATIENT
Start: 2021-03-03 | End: 2021-03-05 | Stop reason: HOSPADM

## 2021-03-03 RX ORDER — BISACODYL 10 MG
10 SUPPOSITORY, RECTAL RECTAL
Status: DISCONTINUED | OUTPATIENT
Start: 2021-03-03 | End: 2021-03-05 | Stop reason: HOSPADM

## 2021-03-03 RX ORDER — HYDROMORPHONE HYDROCHLORIDE 2 MG/ML
INJECTION, SOLUTION INTRAMUSCULAR; INTRAVENOUS; SUBCUTANEOUS PRN
Status: DISCONTINUED | OUTPATIENT
Start: 2021-03-03 | End: 2021-03-03 | Stop reason: SURG

## 2021-03-03 RX ADMIN — SODIUM CHLORIDE, POTASSIUM CHLORIDE, SODIUM LACTATE AND CALCIUM CHLORIDE: 600; 310; 30; 20 INJECTION, SOLUTION INTRAVENOUS at 09:28

## 2021-03-03 RX ADMIN — CEFAZOLIN 2 G: 330 INJECTION, POWDER, FOR SOLUTION INTRAMUSCULAR; INTRAVENOUS at 12:33

## 2021-03-03 RX ADMIN — LEVETIRACETAM INJECTION 500 MG: 5 INJECTION INTRAVENOUS at 18:13

## 2021-03-03 RX ADMIN — HYDROMORPHONE HYDROCHLORIDE 0.5 MG: 2 INJECTION, SOLUTION INTRAMUSCULAR; INTRAVENOUS; SUBCUTANEOUS at 12:26

## 2021-03-03 RX ADMIN — Medication 100 MCG: at 13:46

## 2021-03-03 RX ADMIN — LEVETIRACETAM 1000 MG: 100 INJECTION INTRAVENOUS at 12:43

## 2021-03-03 RX ADMIN — HYDROMORPHONE HYDROCHLORIDE 0.5 MG: 1 INJECTION, SOLUTION INTRAMUSCULAR; INTRAVENOUS; SUBCUTANEOUS at 23:13

## 2021-03-03 RX ADMIN — MIDAZOLAM HYDROCHLORIDE 2 MG: 1 INJECTION, SOLUTION INTRAMUSCULAR; INTRAVENOUS at 12:19

## 2021-03-03 RX ADMIN — REMIFENTANIL HYDROCHLORIDE 50 MCG: 1 INJECTION, POWDER, LYOPHILIZED, FOR SOLUTION INTRAVENOUS at 12:39

## 2021-03-03 RX ADMIN — Medication 100 MCG: at 14:17

## 2021-03-03 RX ADMIN — OXYCODONE HYDROCHLORIDE 10 MG: 10 TABLET ORAL at 18:12

## 2021-03-03 RX ADMIN — Medication 100 MCG: at 14:22

## 2021-03-03 RX ADMIN — DEXAMETHASONE SODIUM PHOSPHATE 10 MG: 4 INJECTION, SOLUTION INTRA-ARTICULAR; INTRALESIONAL; INTRAMUSCULAR; INTRAVENOUS; SOFT TISSUE at 22:50

## 2021-03-03 RX ADMIN — DIPHENHYDRAMINE HYDROCHLORIDE 25 MG: 50 INJECTION INTRAMUSCULAR; INTRAVENOUS at 22:44

## 2021-03-03 RX ADMIN — Medication 100 MCG: at 13:30

## 2021-03-03 RX ADMIN — SODIUM CHLORIDE, POTASSIUM CHLORIDE, SODIUM LACTATE AND CALCIUM CHLORIDE: 600; 310; 30; 20 INJECTION, SOLUTION INTRAVENOUS at 12:15

## 2021-03-03 RX ADMIN — ONDANSETRON 4 MG: 2 INJECTION INTRAMUSCULAR; INTRAVENOUS at 20:08

## 2021-03-03 RX ADMIN — CEFAZOLIN SODIUM 2 G: 2 INJECTION, SOLUTION INTRAVENOUS at 20:20

## 2021-03-03 RX ADMIN — Medication 100 MCG: at 13:02

## 2021-03-03 RX ADMIN — OXYCODONE HYDROCHLORIDE 10 MG: 5 SOLUTION ORAL at 15:50

## 2021-03-03 RX ADMIN — LIDOCAINE HYDROCHLORIDE 0.5 ML: 10 INJECTION, SOLUTION EPIDURAL; INFILTRATION; INTRACAUDAL at 09:28

## 2021-03-03 RX ADMIN — ROCURONIUM BROMIDE 50 MG: 10 INJECTION, SOLUTION INTRAVENOUS at 12:29

## 2021-03-03 RX ADMIN — DEXAMETHASONE SODIUM PHOSPHATE 8 MG: 4 INJECTION, SOLUTION INTRA-ARTICULAR; INTRALESIONAL; INTRAMUSCULAR; INTRAVENOUS; SOFT TISSUE at 12:37

## 2021-03-03 RX ADMIN — PROPOFOL 130 MG: 10 INJECTION, EMULSION INTRAVENOUS at 12:29

## 2021-03-03 RX ADMIN — Medication 100 MCG: at 13:39

## 2021-03-03 RX ADMIN — MEPERIDINE HYDROCHLORIDE 12.5 MG: 25 INJECTION INTRAMUSCULAR; INTRAVENOUS; SUBCUTANEOUS at 15:15

## 2021-03-03 RX ADMIN — REMIFENTANIL HYDROCHLORIDE 0.12 MCG/KG/MIN: 1 INJECTION, POWDER, LYOPHILIZED, FOR SOLUTION INTRAVENOUS at 12:42

## 2021-03-03 RX ADMIN — ONDANSETRON 4 MG: 2 INJECTION INTRAMUSCULAR; INTRAVENOUS at 15:18

## 2021-03-03 RX ADMIN — ACETAMINOPHEN 1000 MG: 500 TABLET ORAL at 09:20

## 2021-03-03 RX ADMIN — MEPERIDINE HYDROCHLORIDE 12.5 MG: 25 INJECTION INTRAMUSCULAR; INTRAVENOUS; SUBCUTANEOUS at 15:20

## 2021-03-03 RX ADMIN — REMIFENTANIL HYDROCHLORIDE 50 MCG: 1 INJECTION, POWDER, LYOPHILIZED, FOR SOLUTION INTRAVENOUS at 12:26

## 2021-03-03 RX ADMIN — ROCURONIUM BROMIDE 10 MG: 10 INJECTION, SOLUTION INTRAVENOUS at 13:06

## 2021-03-03 RX ADMIN — FENTANYL CITRATE 50 MCG: 50 INJECTION, SOLUTION INTRAMUSCULAR; INTRAVENOUS at 15:45

## 2021-03-03 RX ADMIN — HYDROMORPHONE HYDROCHLORIDE 0.5 MG: 2 INJECTION, SOLUTION INTRAMUSCULAR; INTRAVENOUS; SUBCUTANEOUS at 14:25

## 2021-03-03 RX ADMIN — DOCUSATE SODIUM 50 MG AND SENNOSIDES 8.6 MG 1 TABLET: 8.6; 5 TABLET, FILM COATED ORAL at 20:20

## 2021-03-03 RX ADMIN — LEVETIRACETAM 1000 MG: 100 INJECTION INTRAVENOUS at 12:37

## 2021-03-03 RX ADMIN — LIDOCAINE HYDROCHLORIDE 20 MG: 20 INJECTION, SOLUTION EPIDURAL; INFILTRATION; INTRACAUDAL at 14:37

## 2021-03-03 RX ADMIN — ONDANSETRON 4 MG: 2 INJECTION INTRAMUSCULAR; INTRAVENOUS at 14:19

## 2021-03-03 RX ADMIN — Medication 100 MCG: at 13:56

## 2021-03-03 RX ADMIN — GADOTERIDOL 13 ML: 279.3 INJECTION, SOLUTION INTRAVENOUS at 09:51

## 2021-03-03 RX ADMIN — POTASSIUM CHLORIDE AND SODIUM CHLORIDE: 900; 150 INJECTION, SOLUTION INTRAVENOUS at 16:32

## 2021-03-03 RX ADMIN — POVIDONE IODINE 15 ML: 100 SOLUTION TOPICAL at 09:19

## 2021-03-03 RX ADMIN — DEXAMETHASONE SODIUM PHOSPHATE 4 MG: 4 INJECTION, SOLUTION INTRA-ARTICULAR; INTRALESIONAL; INTRAMUSCULAR; INTRAVENOUS; SOFT TISSUE at 22:38

## 2021-03-03 RX ADMIN — ROCURONIUM BROMIDE 50 MG: 10 INJECTION, SOLUTION INTRAVENOUS at 12:40

## 2021-03-03 RX ADMIN — LIDOCAINE HYDROCHLORIDE 60 MG: 20 INJECTION, SOLUTION EPIDURAL; INFILTRATION; INTRACAUDAL at 12:29

## 2021-03-03 RX ADMIN — Medication 100 MCG: at 13:14

## 2021-03-03 RX ADMIN — Medication 100 MCG: at 13:18

## 2021-03-03 ASSESSMENT — COGNITIVE AND FUNCTIONAL STATUS - GENERAL
MOVING FROM LYING ON BACK TO SITTING ON SIDE OF FLAT BED: A LITTLE
DRESSING REGULAR LOWER BODY CLOTHING: A LITTLE
DAILY ACTIVITIY SCORE: 18
TOILETING: A LITTLE
EATING MEALS: A LITTLE
MOVING TO AND FROM BED TO CHAIR: A LITTLE
WALKING IN HOSPITAL ROOM: A LITTLE
SUGGESTED CMS G CODE MODIFIER MOBILITY: CK
DRESSING REGULAR UPPER BODY CLOTHING: A LITTLE
STANDING UP FROM CHAIR USING ARMS: A LITTLE
SUGGESTED CMS G CODE MODIFIER DAILY ACTIVITY: CK
TURNING FROM BACK TO SIDE WHILE IN FLAT BAD: A LITTLE
MOBILITY SCORE: 18
HELP NEEDED FOR BATHING: A LITTLE
CLIMB 3 TO 5 STEPS WITH RAILING: A LITTLE
PERSONAL GROOMING: A LITTLE

## 2021-03-03 ASSESSMENT — LIFESTYLE VARIABLES
ALCOHOL_USE: NO
ON A TYPICAL DAY WHEN YOU DRINK ALCOHOL HOW MANY DRINKS DO YOU HAVE: 0
DOES PATIENT WANT TO STOP DRINKING: NO
AVERAGE NUMBER OF DAYS PER WEEK YOU HAVE A DRINK CONTAINING ALCOHOL: 0
EVER HAD A DRINK FIRST THING IN THE MORNING TO STEADY YOUR NERVES TO GET RID OF A HANGOVER: NO
TOTAL SCORE: 0
HAVE YOU EVER FELT YOU SHOULD CUT DOWN ON YOUR DRINKING: NO
EVER FELT BAD OR GUILTY ABOUT YOUR DRINKING: NO
HAVE PEOPLE ANNOYED YOU BY CRITICIZING YOUR DRINKING: NO
HOW MANY TIMES IN THE PAST YEAR HAVE YOU HAD 5 OR MORE DRINKS IN A DAY: 0
CONSUMPTION TOTAL: NEGATIVE

## 2021-03-03 ASSESSMENT — PAIN DESCRIPTION - PAIN TYPE
TYPE: SURGICAL PAIN

## 2021-03-03 ASSESSMENT — FIBROSIS 4 INDEX
FIB4 SCORE: 2.87
FIB4 SCORE: 2.87

## 2021-03-03 ASSESSMENT — PATIENT HEALTH QUESTIONNAIRE - PHQ9
SUM OF ALL RESPONSES TO PHQ9 QUESTIONS 1 AND 2: 0
1. LITTLE INTEREST OR PLEASURE IN DOING THINGS: NOT AT ALL
2. FEELING DOWN, DEPRESSED, IRRITABLE, OR HOPELESS: NOT AT ALL

## 2021-03-03 NOTE — ANESTHESIA TIME REPORT
Anesthesia Start and Stop Event Times     Date Time Event    3/3/2021 1215 Ready for Procedure     1215 Anesthesia Start     1452 Anesthesia Stop        Responsible Staff  03/03/21    Name Role Begin End    Feliciano Irizarry M.D. Anesth 1215 1452        Preop Diagnosis (Free Text):  Pre-op Diagnosis     Breast Cancer        Preop Diagnosis (Codes):    Post op Diagnosis  Breast cancer (HCC)      Premium Reason  Non-Premium    Comments:

## 2021-03-03 NOTE — ANESTHESIA PROCEDURE NOTES
Airway    Date/Time: 3/3/2021 12:31 PM  Performed by: Feliciano Irizarry M.D.  Authorized by: Feliciano Irizarry M.D.     Location:  OR  Urgency:  Elective  Indications for Airway Management:  Anesthesia      Spontaneous Ventilation: absent    Sedation Level:  Deep  Preoxygenated: Yes    Patient Position:  Sniffing  Final Airway Type:  Endotracheal airway  Final Endotracheal Airway:  ETT  Cuffed: Yes    Technique Used for Successful ETT Placement:  Direct laryngoscopy    Insertion Site:  Oral  Blade Type:  Jaison  Laryngoscope Blade/Videolaryngoscope Blade Size:  3  ETT Size (mm):  7.0  Measured from:  Teeth  ETT to Teeth (cm):  22  Placement Verified by: auscultation and capnometry    Cormack-Lehane Classification:  Grade I - full view of glottis  Number of Attempts at Approach:  1

## 2021-03-03 NOTE — ANESTHESIA PROCEDURE NOTES
Arterial Line  Performed by: Feliciano Irizarry M.D.  Authorized by: Feliciano Irizarry M.D.     Start Time:  3/3/2021 12:32 PM  End Time:  3/3/2021 12:35 PM  Localization: surface landmarks    Patient Location:  OR  Indication: continuous blood pressure monitoring        Catheter Size:  20 G  Seldinger Technique?: Yes    Site:  Radial artery  Line Secured:  Antimicrobial disc, tape and transparent dressing  Events: patient tolerated procedure well with no complications

## 2021-03-03 NOTE — OR SURGEON
Fax received.   Immediate Post OP Note    PreOp Diagnosis: metastatic brain tumor   PostOp Diagnosis: metastatic brain tumor     Procedure(s):  CRANIOTOMY, USING FRAMELESS STEREOTAXY-FOR TUMOR - Wound Class: Clean    Surgeon(s):  German Snowden M.D.    Anesthesiologist/Type of Anesthesia:  Anesthesiologist: Feliciano Irizarry M.D.  Anesthesia Technician: Charlie Parson/General    Surgical Staff:  Assistant: FELY Fay  Circulator: Nilam Lucas R.N.  Relief Scrub: Monisha Anderson  Scrub Person: Ladan Melgar    Specimens removed if any:  ID Type Source Tests Collected by Time Destination   A : Right frontal tumor Tissue Brain PATHOLOGY SPECIMEN German Snowden M.D. 3/3/2021  1:43 PM        Estimated Blood Loss: min     Findings: good tumor resection     Complications: none         3/3/2021 2:55 PM FELY Fay

## 2021-03-04 ENCOUNTER — APPOINTMENT (OUTPATIENT)
Dept: RADIOLOGY | Facility: MEDICAL CENTER | Age: 56
DRG: 027 | End: 2021-03-04
Attending: NEUROLOGICAL SURGERY
Payer: COMMERCIAL

## 2021-03-04 LAB
ANION GAP SERPL CALC-SCNC: 10 MMOL/L (ref 7–16)
BUN SERPL-MCNC: 12 MG/DL (ref 8–22)
CALCIUM SERPL-MCNC: 7.1 MG/DL (ref 8.5–10.5)
CHLORIDE SERPL-SCNC: 104 MMOL/L (ref 96–112)
CO2 SERPL-SCNC: 20 MMOL/L (ref 20–33)
CREAT SERPL-MCNC: 0.59 MG/DL (ref 0.5–1.4)
ERYTHROCYTE [DISTWIDTH] IN BLOOD BY AUTOMATED COUNT: 56.3 FL (ref 35.9–50)
GLUCOSE SERPL-MCNC: 166 MG/DL (ref 65–99)
HCT VFR BLD AUTO: 27.3 % (ref 37–47)
HGB BLD-MCNC: 9.3 G/DL (ref 12–16)
MCH RBC QN AUTO: 38.1 PG (ref 27–33)
MCHC RBC AUTO-ENTMCNC: 34.1 G/DL (ref 33.6–35)
MCV RBC AUTO: 111.9 FL (ref 81.4–97.8)
PLATELET # BLD AUTO: 70 K/UL (ref 164–446)
PMV BLD AUTO: 10.3 FL (ref 9–12.9)
POTASSIUM SERPL-SCNC: 3.8 MMOL/L (ref 3.6–5.5)
RBC # BLD AUTO: 2.44 M/UL (ref 4.2–5.4)
SODIUM SERPL-SCNC: 134 MMOL/L (ref 135–145)
WBC # BLD AUTO: 3.6 K/UL (ref 4.8–10.8)

## 2021-03-04 PROCEDURE — A9270 NON-COVERED ITEM OR SERVICE: HCPCS | Performed by: NURSE PRACTITIONER

## 2021-03-04 PROCEDURE — A9270 NON-COVERED ITEM OR SERVICE: HCPCS | Performed by: CLINICAL NURSE SPECIALIST

## 2021-03-04 PROCEDURE — 700117 HCHG RX CONTRAST REV CODE 255: Performed by: NURSE PRACTITIONER

## 2021-03-04 PROCEDURE — 700101 HCHG RX REV CODE 250: Performed by: NURSE PRACTITIONER

## 2021-03-04 PROCEDURE — 700102 HCHG RX REV CODE 250 W/ 637 OVERRIDE(OP): Performed by: NURSE PRACTITIONER

## 2021-03-04 PROCEDURE — A9576 INJ PROHANCE MULTIPACK: HCPCS | Performed by: NURSE PRACTITIONER

## 2021-03-04 PROCEDURE — 700111 HCHG RX REV CODE 636 W/ 250 OVERRIDE (IP): Performed by: NURSE PRACTITIONER

## 2021-03-04 PROCEDURE — 85027 COMPLETE CBC AUTOMATED: CPT

## 2021-03-04 PROCEDURE — 97165 OT EVAL LOW COMPLEX 30 MIN: CPT

## 2021-03-04 PROCEDURE — 700102 HCHG RX REV CODE 250 W/ 637 OVERRIDE(OP): Performed by: CLINICAL NURSE SPECIALIST

## 2021-03-04 PROCEDURE — 80048 BASIC METABOLIC PNL TOTAL CA: CPT

## 2021-03-04 PROCEDURE — 770001 HCHG ROOM/CARE - MED/SURG/GYN PRIV*

## 2021-03-04 PROCEDURE — 97161 PT EVAL LOW COMPLEX 20 MIN: CPT

## 2021-03-04 PROCEDURE — 70553 MRI BRAIN STEM W/O & W/DYE: CPT

## 2021-03-04 RX ORDER — LEVETIRACETAM 500 MG/1
500 TABLET ORAL 2 TIMES DAILY
Status: DISCONTINUED | OUTPATIENT
Start: 2021-03-04 | End: 2021-03-05 | Stop reason: HOSPADM

## 2021-03-04 RX ORDER — DEXAMETHASONE 1 MG
2 TABLET ORAL EVERY 12 HOURS
Status: DISCONTINUED | OUTPATIENT
Start: 2021-03-06 | End: 2021-03-05 | Stop reason: HOSPADM

## 2021-03-04 RX ORDER — DEXAMETHASONE 4 MG/1
4 TABLET ORAL EVERY 8 HOURS
Status: COMPLETED | OUTPATIENT
Start: 2021-03-04 | End: 2021-03-05

## 2021-03-04 RX ORDER — DEXAMETHASONE 4 MG/1
4 TABLET ORAL EVERY 12 HOURS
Status: DISCONTINUED | OUTPATIENT
Start: 2021-03-05 | End: 2021-03-05 | Stop reason: HOSPADM

## 2021-03-04 RX ADMIN — ATORVASTATIN CALCIUM 80 MG: 40 TABLET, FILM COATED ORAL at 21:08

## 2021-03-04 RX ADMIN — OXYCODONE HYDROCHLORIDE 10 MG: 10 TABLET ORAL at 17:33

## 2021-03-04 RX ADMIN — OXYCODONE 5 MG: 5 TABLET ORAL at 08:41

## 2021-03-04 RX ADMIN — Medication 2 TABLET: at 05:32

## 2021-03-04 RX ADMIN — OXYCODONE HYDROCHLORIDE 10 MG: 10 TABLET ORAL at 12:16

## 2021-03-04 RX ADMIN — DOCUSATE SODIUM 50 MG AND SENNOSIDES 8.6 MG 1 TABLET: 8.6; 5 TABLET, FILM COATED ORAL at 21:08

## 2021-03-04 RX ADMIN — DEXAMETHASONE 4 MG: 4 TABLET ORAL at 21:08

## 2021-03-04 RX ADMIN — POTASSIUM CHLORIDE AND SODIUM CHLORIDE: 900; 150 INJECTION, SOLUTION INTRAVENOUS at 01:37

## 2021-03-04 RX ADMIN — MINERAL OIL, PETROLATUM 1 APPLICATION: 425; 573 OINTMENT OPHTHALMIC at 21:14

## 2021-03-04 RX ADMIN — CEFAZOLIN SODIUM 2 G: 2 INJECTION, SOLUTION INTRAVENOUS at 03:54

## 2021-03-04 RX ADMIN — DOCUSATE SODIUM 100 MG: 100 CAPSULE, LIQUID FILLED ORAL at 17:22

## 2021-03-04 RX ADMIN — LEVETIRACETAM INJECTION 500 MG: 5 INJECTION INTRAVENOUS at 05:32

## 2021-03-04 RX ADMIN — HYDROMORPHONE HYDROCHLORIDE 0.5 MG: 1 INJECTION, SOLUTION INTRAMUSCULAR; INTRAVENOUS; SUBCUTANEOUS at 02:55

## 2021-03-04 RX ADMIN — Medication 2 TABLET: at 17:22

## 2021-03-04 RX ADMIN — DEXAMETHASONE 4 MG: 4 TABLET ORAL at 14:07

## 2021-03-04 RX ADMIN — LEVETIRACETAM 500 MG: 500 TABLET ORAL at 17:22

## 2021-03-04 RX ADMIN — ONDANSETRON 4 MG: 2 INJECTION INTRAMUSCULAR; INTRAVENOUS at 08:41

## 2021-03-04 RX ADMIN — OXYCODONE 5 MG: 5 TABLET ORAL at 21:11

## 2021-03-04 RX ADMIN — DEXAMETHASONE SODIUM PHOSPHATE 10 MG: 4 INJECTION, SOLUTION INTRA-ARTICULAR; INTRALESIONAL; INTRAMUSCULAR; INTRAVENOUS; SOFT TISSUE at 05:32

## 2021-03-04 RX ADMIN — GADOTERIDOL 15 ML: 279.3 INJECTION, SOLUTION INTRAVENOUS at 11:29

## 2021-03-04 ASSESSMENT — COGNITIVE AND FUNCTIONAL STATUS - GENERAL
WALKING IN HOSPITAL ROOM: A LITTLE
MOVING TO AND FROM BED TO CHAIR: A LITTLE
MOVING FROM LYING ON BACK TO SITTING ON SIDE OF FLAT BED: A LITTLE
MOBILITY SCORE: 18
SUGGESTED CMS G CODE MODIFIER MOBILITY: CK
CLIMB 3 TO 5 STEPS WITH RAILING: A LITTLE
SUGGESTED CMS G CODE MODIFIER DAILY ACTIVITY: CH
DAILY ACTIVITIY SCORE: 24
TURNING FROM BACK TO SIDE WHILE IN FLAT BAD: A LITTLE
STANDING UP FROM CHAIR USING ARMS: A LITTLE

## 2021-03-04 ASSESSMENT — GAIT ASSESSMENTS
DISTANCE (FEET): 200
GAIT LEVEL OF ASSIST: SUPERVISED

## 2021-03-04 ASSESSMENT — PAIN DESCRIPTION - PAIN TYPE
TYPE: ACUTE PAIN;SURGICAL PAIN
TYPE: ACUTE PAIN
TYPE: SURGICAL PAIN

## 2021-03-04 ASSESSMENT — ACTIVITIES OF DAILY LIVING (ADL): TOILETING: INDEPENDENT

## 2021-03-04 ASSESSMENT — FIBROSIS 4 INDEX: FIB4 SCORE: 2.87

## 2021-03-04 NOTE — PROGRESS NOTES
"1410 Pt arrived via stretcher, full spinal precautions in place. C.collar in place. Pt MOORE with 5/5 strength, A&Ox4, no c/o pain except for a \"scratchy throat.\" No c/o numbness or tingling. Incision on anterior throat CDI.   VSS.  "

## 2021-03-04 NOTE — THERAPY
Physical Therapy   Initial Evaluation     Patient Name: Michelle Diana  Age:  55 y.o., Sex:  female  Medical Record #: [id number]  Today's Date: 3/4/2021     Precautions: Other (See Comments)    Assessment  Patient is 55 y.o. female s/p craniotomy R frontal lobe d/t malignant neoplasm metastasized from the breast.  She lives in a 2 story home w/ her , who was present throughout the eval, w/ 2 steps to enter, and was previously independent w/ functional mobility and ambulation tasks using no AD.  Her  endorses that the pt will have 24/7 support from either him or other family members when returning home.  Currently the pt complains of headache and lethargy, however can complete bed mobility and transfers spv, and ambulate 200' spv w/ no AD.  She is able to look up and to the R and L during ambulation w/ no LOB or increase is symptoms.  Bedside testing is normal.  Based on current pt presentation of functional mobility, recommend d/c home w/ no further PT services warranted.  Will not follow.      Plan    Recommend Physical Therapy for Evaluation only.             Subjective/Objective       03/04/21 1053   Prior Living Situation   Prior Services Home-Independent   Housing / Facility 2 Story House   Steps Into Home 2   Steps In Home   (FOS)   Equipment Owned None   Lives with - Patient's Self Care Capacity Spouse  (present throughout eval)   Comments Pt's bedroom is on the 1st floor and does not need to go up to the second floor for anthing if she needs to avoid it.   Prior Level of Functional Mobility   Bed Mobility Independent   Transfer Status Independent   Ambulation Independent   Distance Ambulation (Feet)   (community distances)   Assistive Devices Used None   Stairs Independent   History of Falls   History of Falls No   Cognition    Cognition / Consciousness WDL   Level of Consciousness Alert   Comments Pt pleasant and cooperative.   Strength Lower Body   Lower Body Strength  WDL   Comments  Observed during functional mobility   Sensation Lower Body   Lower Extremity Sensation   WDL   Comments Pt reports no numbness/tingling in the extremeties.   Neurological Concerns   Comments Pt's visual tracking and saccadic movements intact   Balance Assessment   Sitting Balance (Static) Fair +   Sitting Balance (Dynamic) Fair   Standing Balance (Static) Fair   Standing Balance (Dynamic) Fair   Weight Shift Sitting Good   Weight Shift Standing Fair   Comments stand using no AD   Gait Analysis   Gait Level Of Assist Supervised   Assistive Device None   Distance (Feet) 200   # of Times Distance was Traveled 1   Weight Bearing Status FWB LE's   Functional Mobility   Sit to Stand Minimal Assist   Mobility HHAx2 from PT   ICU Target Mobility Level   ICU Mobility - Targeted Level Level 4   How much difficulty does the patient currently have...   Turning over in bed (including adjusting bedclothes, sheets and blankets)? 3   Sitting down on and standing up from a chair with arms (e.g., wheelchair, bedside commode, etc.) 3   Moving from lying on back to sitting on the side of the bed? 3   How much help from another person does the patient currently need...   Moving to and from a bed to a chair (including a wheelchair)? 3   Need to walk in a hospital room? 3   Climbing 3-5 steps with a railing? 3   6 clicks Mobility Score 18   Activity Tolerance   Sitting Edge of Bed 10min   Standing 5min   Comments Able to stad w/ no AD   Edema / Skin Assessment   Edema / Skin  X   Education Group   Education Provided Role of Physical Therapist   Role of Physical Therapist Patient Response Family;Acceptance;Handout;;Verbal Demonstration   Additional Comments Pt educated on avoiding valsava durng functional tasks

## 2021-03-04 NOTE — ANESTHESIA POSTPROCEDURE EVALUATION
Patient: Michelle Diana    Procedure Summary     Date: 03/03/21 Room / Location: [facility] 04 / SURGERY [facility]    Anesthesia Start: 1215 Anesthesia Stop: 1452    Procedure: CRANIOTOMY, USING FRAMELESS STEREOTAXY-FOR TUMOR (Right Head) Diagnosis: (Breast Cancer)    Surgeons: German Snowden M.D. Responsible Provider: Feliciano Irizarry M.D.    Anesthesia Type: general ASA Status: 2          Final Anesthesia Type: general  Last vitals  BP   Blood Pressure: 115/56, NIBP: 117/63, Arterial BP: 119/54    Temp   36.7 °C (98 °F)    Pulse   98   Resp   16    SpO2   92 %      Anesthesia Post Evaluation    Patient location during evaluation: PACU  Patient participation: complete - patient participated  Level of consciousness: awake and alert    Airway patency: patent  Anesthetic complications: no  Cardiovascular status: hemodynamically stable  Respiratory status: acceptable  Hydration status: euvolemic    PONV: none          No complications documented.     Nurse Pain Score: 3 (NPRS)

## 2021-03-04 NOTE — CARE PLAN
Problem: Pain Management  Goal: Pain level will decrease to patient's comfort goal  Outcome: PROGRESSING AS EXPECTED     Problem: Communication  Goal: The ability to communicate needs accurately and effectively will improve  Outcome: PROGRESSING AS EXPECTED     Problem: Safety  Goal: Will remain free from injury  Outcome: PROGRESSING AS EXPECTED  Note: Hourly pt rounding.   Goal: Will remain free from falls  Note: Bed alarm on,  socks available. Education provided to call for assistance.     Problem: Knowledge Deficit  Goal: Knowledge of disease process/condition, treatment plan, diagnostic tests, and medications will improve  Outcome: PROGRESSING AS EXPECTED  Note: Pt and  Anjel updated on POC

## 2021-03-04 NOTE — OP REPORT
DATE OF SERVICE:  03/03/2021     PREOPERATIVE DIAGNOSIS:  Known breast cancer with multiple intracranial   metastatic lesions, one of which is not responsive to her current   chemotherapeutic regimen.     POSTOPERATIVE DIAGNOSIS:  Known breast cancer with multiple intracranial   metastatic lesions, one of which is not responsive to her current   chemotherapeutic regimen.     PROCEDURES:  1.  Right-sided pterional craniotomy for resection of right-sided middle   frontal gyrus tumor.  2.  Use of operative microscope for microdissection.  3.  Use of Stealth neuronavigation.     SURGEON:  German Snowden MD     ASSISTANT:  HALEY Fay     ANESTHESIA:  General.     COMPLICATIONS:  None.     ESTIMATED BLOOD LOSS:  Minimal.     DESCRIPTION OF PROCEDURE:  The patient was brought to the operating room and   identified in the usual fashion.  General endotracheal anesthesia was induced   by the anesthesia team.  The patient was then placed supine on the operating   table.  All pressure points were meticulously padded.  Bump was placed under   the patient's right shoulder, head was turned toward the left, exposing the   right frontotemporal area.  She was then placed in a Sugita 4-point pin   fixation to the appropriate tightness.  We then registered the Stealth with   about 2.5 mm in accuracy and then a surgical clippers were used to clip a very   small amount of the patient's hair just at the level of her hairline.  We   then marked a pterional incision from the tragus all the way just past   midline.  The patient was then prepped and draped in the usual sterile   fashion.  Local anesthesia was infiltrated in subcutaneous tissue.  A 10-blade   was used to incise the skin.  Dissection was carried down using Bovie   electrocautery through skin all the way down to bone including temporalis   fascia and muscle.  We then undermined temporalis muscle and reflected it   anteriorly using a Bovie.  Jhonatan fish hooks  were then used to flip back the   skin flap and muscle rolled up, a 4x4's was placed underneath the flap to   prevent excessive kinking.  We then confirmed we still had excellent   registration on the Stealth.  We then planned the projection of the location   of the tumor as it projected on the bone.  We then did a small supraorbital   craniotomy.  We placed one bur hole posteriorly.  We had good separation of   the dura from the overlying bone with a Mickey dental and a Penfield 1 and 3.    We then turned a standard craniotomy flap with a craniotome.  A bone flap   was placed in antibiotic irrigation for further use.  FloSeal gentle tamponade   was used for hemostasis.  We then opened the dura in a C-shaped fashion,   pedicled anteriorly.  We had excellent visualization.  At this point of the   tumor as registered on the Stealth, there was slight discoloration on the   surface of the brain.  We then used a combination of bipolar electrocautery   and microscissors to excise the abnormal tissue.  It was sent off for frozen   and formal pathology, frozen was consistent with metastatic tumor.  We then   took a little bit more of the surrounding brain out only millimeters to   confirm we had excellent removal of all the tumor.  Bipolar electrocautery was   used for meticulous hemostasis.  We lined the cavity with Surgicel.  We then   reapproximated the dura using 4-0 Nurolon.  Duragen was placed over the dural   defect.  A titanium plate and screws were used to replace the bone flap.  We   then closed temporalis muscle and fascia with 0 Vicryl.  Galea was closed with   0 Vicryl inverted.  Skin was closed with staples.  We then took the patient   out of pins and placed her in a head wrap.  There were no complications.  I   was present and scrubbed for the entire procedure.  The patient awakened and   was transferred to the recovery room.        ______________________________  INOCENCIA VANN,  MD    CPD/RAMAKRISHNA/TOO    DD:  03/04/2021 11:29  DT:  03/04/2021 12:30    Job#:  [id number]

## 2021-03-04 NOTE — OR NURSING
"Transported to SICU.Pt. neuro.V/S as charted in neuro. assessment,seen and checked by Dr. Snowden.Medicated for C/O\"some headache\"with fair relief.  Pt's.  was updated.  "

## 2021-03-04 NOTE — PROGRESS NOTES
Dr. Snowden at bedside to assess patient and discuss plan of care. Per MD, okay for q4h neuro checks, discontinue arterial line, discontinue Al, and patient okay to transfer to floor.

## 2021-03-04 NOTE — PROGRESS NOTES
Neurosurgery Progress Note    Subjective:  Seen in conjunction w/ dr elliott  Mild SOTO, no nausea  Eating  Al, art line    Exam:    A&O x3, GCS 15  PERRL, EOMI  Face symm, tongue midline  MOORE with FS, no drift  Inc c/d     BP  Min: 99/54  Max: 143/52  Pulse  Av.9  Min: 56  Max: 101  Resp  Av.1  Min: 12  Max: 36  Temp  Av.4 °C (97.6 °F)  Min: 36.2 °C (97.2 °F)  Max: 36.7 °C (98 °F)  Monitored Temp 2  Av.3 °C (99.1 °F)  Min: 36.1 °C (97 °F)  Max: 37.8 °C (100 °F)  SpO2  Av.6 %  Min: 92 %  Max: 100 %    No data recorded    Recent Labs     21  0821  0540   WBC 2.7* 3.6*   RBC 2.94* 2.44*   HEMOGLOBIN 11.3* 9.3*   HEMATOCRIT 33.5* 27.3*   .9* 111.9*   MCH 38.4* 38.1*   MCHC 33.7 34.1   RDW 56.9* 56.3*   PLATELETCT 98* 70*   MPV 10.4 10.3     Recent Labs     21  0826 21  0540   SODIUM 143 134*   POTASSIUM 3.8 3.8   CHLORIDE 108 104   CO2 26 20   GLUCOSE 89 166*   BUN 18 12   CREATININE 0.91 0.59   CALCIUM 9.3 7.1*     Recent Labs     21  0826   APTT 24.9   INR 0.91           Intake/Output       21 07 - 21 0659 21 07 - 21 0659      2580-4824 5786-4754 Total 0663-7436 7969-2488 Total       Intake    P.O.  60  -- 60  --  -- --    P.O. 60 -- 60 -- -- --    I.V.  4400  1200 5600  200  -- 200    Volume (mL) (Lactated Ringers) 1900 -- 1900 -- -- --    Volume (mL) (lactated ringers infusion) 2300 -- 2300 -- -- --    Volume (mL) (0.9 % NaCl with KCl 20 mEq infusion) 200 1200 1400 200 -- 200    IV Piggyback  100  320 420  --  -- --    Volume (mL) (levETIRAcetam (Keppra) 500 mg in 100 mL NaCl IV premix) 100 100 200 -- -- --    Volume (mL) (ceFAZolin in dextrose (ANCEF) IVPB premix 2 g) -- 220 220 -- -- --    Total Intake 4560 1520 6080 200 -- 200       Output    Urine  525  525 1050  --  -- --    Urine 100 -- 100 -- -- --    Number of Times Voided 1 x -- 1 x -- -- --    Urine Void (mL) 200 -- 200 -- -- --    Output (mL) (Urethral Catheter  Latex 16 Fr.) 385 013 944 -- -- --    Emesis  --  250 250  --  -- --    Emesis -- 250 250 -- -- --    Emesis - Number of Times -- 2 x 2 x -- -- --    Stool  --  -- --  --  -- --    Number of Times Stooled 1 x -- 1 x -- -- --    Blood  30  -- 30  --  -- --    Est. Blood Loss 30 -- 30 -- -- --    Total Output  -- -- --       Net I/O     4005 745 4750 200 -- 200            Intake/Output Summary (Last 24 hours) at 3/4/2021 0844  Last data filed at 3/4/2021 0800  Gross per 24 hour   Intake 6280 ml   Output 1330 ml   Net 4950 ml            • levETIRAcetam  500 mg BID   • dexamethasone  4 mg Q8HRS    Followed by   • [START ON 3/5/2021] dexamethasone  4 mg Q12HRS    Followed by   • [START ON 3/6/2021] dexamethasone  2 mg Q12HRS   • atorvastatin  80 mg DAILY   • oyster shell calcium/vitamin D  2 tablet BID   • Abemaciclib  150 mg BID   • Pharmacy Consult Request  1 Each PHARMACY TO DOSE   • MD ALERT...DO NOT ADMINISTER NSAIDS or ASPIRIN unless ORDERED By Neurosurgery  1 Each PRN   • ondansetron  4 mg Q4HRS PRN   • diphenhydrAMINE  25 mg Q6HRS PRN   • scopolamine  1 Patch Q72HRS PRN   • labetalol  10 mg Q HOUR PRN   • hydrALAZINE  10 mg Q HOUR PRN   • cloNIDine  0.1 mg Q4HRS PRN   • docusate sodium  100 mg BID   • senna-docusate  1 tablet Nightly   • senna-docusate  1 tablet Q24HRS PRN   • polyethylene glycol/lytes  1 Packet BID PRN   • magnesium hydroxide  30 mL QDAY PRN   • bisacodyl  10 mg Q24HRS PRN   • fleet  1 Each Once PRN   • artificial tears  1 Application Q8HRS   • 0.9 % NaCl with KCl 20 mEq 1,000 mL   Continuous   • oxyCODONE immediate-release  5 mg Q3HRS PRN    Or   • oxyCODONE immediate-release  10 mg Q3HRS PRN    Or   • HYDROmorphone  0.5 mg Q3HRS PRN       Assessment and Plan:  Hospital day # 2  POD# 1 right crani for tumor  Chemical prophylactic DVT therapy: No  Start date/time: n/a    Neuro intact  D/c art line and good  Transfer to floor  Amb/pt/ot  MRI pending  Adv THEODORE  Bowel protocol  Q4 hour  nc's  Home ? All if doing well

## 2021-03-04 NOTE — PROGRESS NOTES
1630: Pt arrived to S-101 from PACU. 2 RN Skin assessment performed by Leona. No concerns at this time, pt has surgical dressing C/D/I from crani site.

## 2021-03-04 NOTE — THERAPY
Occupational Therapy   Initial Evaluation     Patient Name: Michelle Diana  Age:  55 y.o., Sex:  female  Medical Record #: [id number]  Today's Date: 3/4/2021          Assessment  Patient is 55 y.o. female with a diagnosis of Brain Mets. Pt is at or near his/her functional baseline. Pt with no further skilled OT needs in the acute care setting at this time.     Plan     Occupational Therapy for Evaluation only       Discharge Recommendations: (P) Anticipate that the patient will have no further occupational therapy needs after discharge from the hospital        03/04/21 1147   Prior Living Situation   Prior Services None   Housing / Facility 2 Story House  (can stay on 1st floor)   Equipment Owned None   Lives with - Patient's Self Care Capacity Spouse   Prior Level of ADL Function   Self Feeding Independent   Grooming / Hygiene Independent   Bathing Independent   Dressing Independent   Toileting Independent   ADL Assessment   Grooming Supervision   Upper Body Dressing Supervision   Lower Body Dressing Supervision   Toileting Supervision   Functional Mobility   Sit to Stand Supervised   Bed, Chair, Wheelchair Transfer Supervised

## 2021-03-04 NOTE — PROGRESS NOTES
1000- Patient transported to MRI with patient transport.     1045- Patient returned to [facility] from MRI.

## 2021-03-04 NOTE — CARE PLAN
Problem: Pain Management  Goal: Pain level will decrease to patient's comfort goal  Intervention: Follow pain managment plan developed in collaboration with patient and Interdisciplinary Team  Note: Pain assessed q2h using 0-10 pain scale. Following pain management plan developed by interdisciplinary care team. Patient medicated per MAR.      Problem: Safety  Goal: Will remain free from falls  Intervention: Implement fall precautions  Note: Patient educated to call for assistance prior to getting out of bed. Call light within reach, bed alarm on, sides rails up x3, treaded slipper socks in place, belongings within reach.

## 2021-03-05 VITALS
WEIGHT: 152.78 LBS | TEMPERATURE: 97.9 F | DIASTOLIC BLOOD PRESSURE: 65 MMHG | BODY MASS INDEX: 25.45 KG/M2 | SYSTOLIC BLOOD PRESSURE: 123 MMHG | RESPIRATION RATE: 16 BRPM | HEIGHT: 65 IN | OXYGEN SATURATION: 94 % | HEART RATE: 60 BPM

## 2021-03-05 LAB
ANION GAP SERPL CALC-SCNC: 7 MMOL/L (ref 7–16)
BUN SERPL-MCNC: 13 MG/DL (ref 8–22)
CALCIUM SERPL-MCNC: 8.6 MG/DL (ref 8.5–10.5)
CHLORIDE SERPL-SCNC: 104 MMOL/L (ref 96–112)
CO2 SERPL-SCNC: 25 MMOL/L (ref 20–33)
CREAT SERPL-MCNC: 0.7 MG/DL (ref 0.5–1.4)
ERYTHROCYTE [DISTWIDTH] IN BLOOD BY AUTOMATED COUNT: 58.8 FL (ref 35.9–50)
GLUCOSE SERPL-MCNC: 172 MG/DL (ref 65–99)
HCT VFR BLD AUTO: 28.2 % (ref 37–47)
HGB BLD-MCNC: 9.4 G/DL (ref 12–16)
MCH RBC QN AUTO: 37.8 PG (ref 27–33)
MCHC RBC AUTO-ENTMCNC: 33.3 G/DL (ref 33.6–35)
MCV RBC AUTO: 113.3 FL (ref 81.4–97.8)
PLATELET # BLD AUTO: 74 K/UL (ref 164–446)
PMV BLD AUTO: 10.7 FL (ref 9–12.9)
POTASSIUM SERPL-SCNC: 4.2 MMOL/L (ref 3.6–5.5)
RBC # BLD AUTO: 2.49 M/UL (ref 4.2–5.4)
SODIUM SERPL-SCNC: 136 MMOL/L (ref 135–145)
WBC # BLD AUTO: 4.5 K/UL (ref 4.8–10.8)

## 2021-03-05 PROCEDURE — A9270 NON-COVERED ITEM OR SERVICE: HCPCS | Performed by: CLINICAL NURSE SPECIALIST

## 2021-03-05 PROCEDURE — 85027 COMPLETE CBC AUTOMATED: CPT

## 2021-03-05 PROCEDURE — 80048 BASIC METABOLIC PNL TOTAL CA: CPT

## 2021-03-05 PROCEDURE — 700102 HCHG RX REV CODE 250 W/ 637 OVERRIDE(OP): Performed by: CLINICAL NURSE SPECIALIST

## 2021-03-05 PROCEDURE — 700102 HCHG RX REV CODE 250 W/ 637 OVERRIDE(OP): Performed by: NURSE PRACTITIONER

## 2021-03-05 PROCEDURE — A9270 NON-COVERED ITEM OR SERVICE: HCPCS | Performed by: NURSE PRACTITIONER

## 2021-03-05 RX ORDER — DEXAMETHASONE 2 MG/1
2 TABLET ORAL EVERY 12 HOURS
Qty: 2 TABLET | Refills: 0
Start: 2021-03-06 | End: 2021-10-06

## 2021-03-05 RX ORDER — OXYCODONE HYDROCHLORIDE 5 MG/1
5 TABLET ORAL EVERY 6 HOURS PRN
Qty: 28 TABLET | Refills: 0
Start: 2021-03-05 | End: 2021-03-12

## 2021-03-05 RX ORDER — PSEUDOEPHEDRINE HCL 30 MG
100 TABLET ORAL 2 TIMES DAILY
Qty: 60 CAPSULE | COMMUNITY
Start: 2021-03-05 | End: 2021-12-02

## 2021-03-05 RX ORDER — DEXAMETHASONE 4 MG/1
4 TABLET ORAL EVERY 12 HOURS
Qty: 2 TABLET | Refills: 0
Start: 2021-03-05 | End: 2021-07-27 | Stop reason: SDUPTHER

## 2021-03-05 RX ORDER — LEVETIRACETAM 500 MG/1
500 TABLET ORAL 2 TIMES DAILY
Qty: 10 TABLET | Refills: 0
Start: 2021-03-05 | End: 2021-07-16

## 2021-03-05 RX ADMIN — OXYCODONE 5 MG: 5 TABLET ORAL at 05:27

## 2021-03-05 RX ADMIN — OXYCODONE 5 MG: 5 TABLET ORAL at 01:06

## 2021-03-05 RX ADMIN — DEXAMETHASONE 4 MG: 4 TABLET ORAL at 05:26

## 2021-03-05 RX ADMIN — OXYCODONE 5 MG: 5 TABLET ORAL at 09:07

## 2021-03-05 RX ADMIN — LEVETIRACETAM 500 MG: 500 TABLET ORAL at 05:26

## 2021-03-05 RX ADMIN — Medication 2 TABLET: at 05:26

## 2021-03-05 RX ADMIN — DOCUSATE SODIUM 100 MG: 100 CAPSULE, LIQUID FILLED ORAL at 05:26

## 2021-03-05 RX ADMIN — MINERAL OIL, PETROLATUM 1 APPLICATION: 425; 573 OINTMENT OPHTHALMIC at 05:29

## 2021-03-05 ASSESSMENT — PAIN DESCRIPTION - PAIN TYPE
TYPE: ACUTE PAIN;SURGICAL PAIN
TYPE: SURGICAL PAIN;ACUTE PAIN
TYPE: ACUTE PAIN;SURGICAL PAIN

## 2021-03-05 NOTE — DISCHARGE INSTRUCTIONS
Follow up with HALEY at [facility] in 2 weeks [phone]  Follow up with Dr. Snowden in 6 weeks  Follow up with Dr. Granado as scheduled   No pushing, pulling, lifting greater than 10 pounds  Ok to shower, pat incision dry - 24 hours after surgery. No ointments, creams  No non-steroidal anti-inflammatory medications or aspirin until cleared by Dr. Snowden  Ambulate as much is comfortable  No driving for at least 2 weeks following surgery or until cleared  Obtain over the counter senekot take 1-2 tablets daily while taking narcotic pain medication  Do not return to work until cleared by physician    Discharge Instructions    Discharged to home by car with relative. Discharged via wheelchair, hospital escort: Yes.  Special equipment needed: Not Applicable    Be sure to schedule a follow-up appointment with your primary care doctor or any specialists as instructed.     Discharge Plan:   Diet Plan: Discussed  Activity Level: Discussed  Confirmed Follow up Appointment: Patient to Call and Schedule Appointment  Confirmed Symptoms Management: Discussed  Medication Reconciliation Updated: Yes  Influenza Vaccine Indication: Not indicated: Previously immunized this influenza season and > 8 years of age    I understand that a diet low in cholesterol, fat, and sodium is recommended for good health. Unless I have been given specific instructions below for another diet, I accept this instruction as my diet prescription.   Other diet: regular diet    Special Instructions: None    · Is patient discharged on Warfarin / Coumadin?   No     Depression / Suicide Risk    As you are discharged from this [facility] facility, it is important to learn how to keep safe from harming yourself.    Recognize the warning signs:  · Abrupt changes in personality, positive or negative- including increase in energy   · Giving away possessions  · Change in eating patterns- significant weight changes-  positive or negative  · Change in  sleeping patterns- unable to sleep or sleeping all the time   · Unwillingness or inability to communicate  · Depression  · Unusual sadness, discouragement and loneliness  · Talk of wanting to die  · Neglect of personal appearance   · Rebelliousness- reckless behavior  · Withdrawal from people/activities they love  · Confusion- inability to concentrate     If you or a loved one observes any of these behaviors or has concerns about self-harm, here's what you can do:  · Talk about it- your feelings and reasons for harming yourself  · Remove any means that you might use to hurt yourself (examples: pills, rope, extension cords, firearm)  · Get professional help from the community (Mental Health, Substance Abuse, psychological counseling)  · Do not be alone:Call your Safe Contact- someone whom you trust who will be there for you.  · Call your local CRISIS HOTLINE 870-5907 or 727-445-6818  · Call your local Children's Mobile Crisis Response Team [place] (649) 416-8269 or www.[employer]  · Call the toll free National Suicide Prevention Hotlines   · National Suicide Prevention Lifeline 694-467-ZUVF (2174)  · National Hope Line Network 800-SUICIDE (676-7052)    Dexamethasone tablets  What is this medicine?  DEXAMETHASONE (dex a METH a sone) is a corticosteroid. It is commonly used to treat inflammation of the skin, joints, lungs, and other organs. Common conditions treated include asthma, allergies, and arthritis. It is also used for other conditions, such as blood disorders and diseases of the adrenal glands.  This medicine may be used for other purposes; ask your health care provider or pharmacist if you have questions.  COMMON BRAND NAME(S): CUSHINGS SYNDROME DIAGNOSTIC, Decadron, Dexabliss, DexPak Jr TaperPak, DexPak TaperPak, Dxevo, HiDex, TaperDex, Zema-Ronni, ZoDex, ZonaCort 11 Day, ZonaCort 7 Day  What should I tell my health care provider before I take this medicine?  They need to know if you have any of  these conditions:  · Cushing's syndrome  · diabetes  · glaucoma  · heart problems or disease  · high blood pressure  · infection like herpes, measles, tuberculosis, or chickenpox  · kidney disease  · liver disease  · mental problems  · myasthenia gravis  · osteoporosis  · previous heart attack  · seizures  · stomach, ulcer or intestine disease including colitis and diverticulitis  · thyroid problem  · an unusual or allergic reaction to dexamethasone, corticosteroids, other medicines, lactose, foods, dyes, or preservatives  · pregnant or trying to get pregnant  · breast-feeding  How should I use this medicine?  Take this medicine by mouth with a drink of water. Follow the directions on the prescription label. Take it with food or milk to avoid stomach upset. If you are taking this medicine once a day, take it in the morning. Do not take more medicine than you are told to take. Do not suddenly stop taking your medicine because you may develop a severe reaction. Your doctor will tell you how much medicine to take. If your doctor wants you to stop the medicine, the dose may be slowly lowered over time to avoid any side effects.  Talk to your pediatrician regarding the use of this medicine in children. Special care may be needed.  Patients over 65 years old may have a stronger reaction and need a smaller dose.  Overdosage: If you think you have taken too much of this medicine contact a poison control center or emergency room at once.  NOTE: This medicine is only for you. Do not share this medicine with others.  What if I miss a dose?  If you miss a dose, take it as soon as you can. If it is almost time for your next dose, talk to your doctor or health care professional. You may need to miss a dose or take an extra dose. Do not take double or extra doses without advice.  What may interact with this medicine?  Do not take this medicine with any of the following medications:  · mifepristone, RU-486  · vaccines  This  medicine may also interact with the following medications:  · amphotericin B  · antibiotics like clarithromycin, erythromycin, and troleandomycin  · aspirin and aspirin-like drugs  · barbiturates like phenobarbital  · carbamazepine  · cholestyramine  · cholinesterase inhibitors like donepezil, galantamine, rivastigmine, and tacrine  · cyclosporine  · digoxin  · diuretics  · ephedrine  · female hormones, like estrogens or progestins and birth control pills  · indinavir  · isoniazid  · ketoconazole  · medicines for diabetes  · medicines that improve muscle tone or strength for conditions like myasthenia gravis  · NSAIDs, medicines for pain and inflammation, like ibuprofen or naproxen  · phenytoin  · rifampin  · thalidomide  · warfarin  This list may not describe all possible interactions. Give your health care provider a list of all the medicines, herbs, non-prescription drugs, or dietary supplements you use. Also tell them if you smoke, drink alcohol, or use illegal drugs. Some items may interact with your medicine.  What should I watch for while using this medicine?  Visit your doctor or health care professional for regular checks on your progress. If you are taking this medicine over a prolonged period, carry an identification card with your name and address, the type and dose of your medicine, and your doctor's name and address.  This medicine may increase your risk of getting an infection. Stay away from people who are sick. Tell your doctor or health care professional if you are around anyone with measles or chickenpox.  If you are going to have surgery, tell your doctor or health care professional that you have taken this medicine within the last twelve months.  Ask your doctor or health care professional about your diet. You may need to lower the amount of salt you eat.  This medicine may increase blood sugar. Ask your healthcare provider if changes in diet or medicines are needed if you have diabetes.  What  side effects may I notice from receiving this medicine?  Side effects that you should report to your doctor or health care professional as soon as possible:  · allergic reactions like skin rash, itching or hives, swelling of the face, lips, or tongue  · fever, sore throat, sneezing, cough, or other signs of infection, wounds that will not heal  · mental depression, mood swings, mistaken feelings of self importance or of being mistreated  · pain in hips, back, ribs, arms, shoulders, or legs  · redness, blistering, peeling or loosening of the skin, including inside the mouth  ·   signs and symptoms of high blood sugar such as being more thirsty or hungry or having to urinate more than normal. You may also feel very tired or have blurry vision.  · trouble passing urine  · swelling of feet or lower legs  · unusual bleeding or bruising  Side effects that usually do not require medical attention (report to your doctor or health care professional if they continue or are bothersome):  · headache  · nausea, vomiting  · skin problems, acne, thin and shiny skin  · weight gain  This list may not describe all possible side effects. Call your doctor for medical advice about side effects. You may report side effects to FDA at 6-159-FDA-3754.  Where should I keep my medicine?  Keep out of the reach of children.  Store at room temperature between 20 and 25 degrees C (68 and 77 degrees F). Protect from light. Throw away any unused medicine after the expiration date.  NOTE: This sheet is a summary. It may not cover all possible information. If you have questions about this medicine, talk to your doctor, pharmacist, or health care provider.  © 2020 Elsevier/Gold Standard (2019-09-18 10:58:53)  Docusate capsules  What is this medicine?  DOCUSATE (doc CUE sayt) is stool softener. It helps prevent constipation and straining or discomfort associated with hard or dry stools.  This medicine may be used for other purposes; ask your health  care provider or pharmacist if you have questions.  COMMON BRAND NAME(S): Colace, Colace Clear, Correctol, D.O.S., DC, Doc-Q-Lace, DocuLace, Docusoft S, DOK, DOK Extra Strength, Dulcolax, Genasoft, Ishaan-Tin, Kaopectate Liqui-Gels, Garrison Stool Softener, Stool Softener, Stool Softner DC, Sulfolax, Jason-Q-Lax, Surfak, Uni-Ease  What should I tell my health care provider before I take this medicine?  They need to know if you have any of these conditions:  · nausea or vomiting  · severe constipation  · stomach pain  · sudden change in bowel habit lasting more than 2 weeks  · an unusual or allergic reaction to docusate, other medicines, foods, dyes, or preservatives  · pregnant or trying to get pregnant  · breast-feeding  How should I use this medicine?  Take this medicine by mouth with a glass of water. Follow the directions on the label. Take your doses at regular intervals. Do not take your medicine more often than directed.  Talk to your pediatrician regarding the use of this medicine in children. While this medicine may be prescribed for children as young as 2 years for selected conditions, precautions do apply.  Overdosage: If you think you have taken too much of this medicine contact a poison control center or emergency room at once.  NOTE: This medicine is only for you. Do not share this medicine with others.  What if I miss a dose?  If you miss a dose, take it as soon as you can. If it is almost time for your next dose, take only that dose. Do not take double or extra doses.  What may interact with this medicine?  · mineral oil  This list may not describe all possible interactions. Give your health care provider a list of all the medicines, herbs, non-prescription drugs, or dietary supplements you use. Also tell them if you smoke, drink alcohol, or use illegal drugs. Some items may interact with your medicine.  What should I watch for while using this medicine?  Do not use for more than one week without advice  from your doctor or health care professional. If your constipation returns, check with your doctor or health care professional.  Drink plenty of water while taking this medicine. Drinking water helps decrease constipation.  Stop using this medicine and contact your doctor or health care professional if you experience any rectal bleeding or do not have a bowel movement after use. These could be signs of a more serious condition.  What side effects may I notice from receiving this medicine?  Side effects that you should report to your doctor or health care professional as soon as possible:  · allergic reactions like skin rash, itching or hives, swelling of the face, lips, or tongue  Side effects that usually do not require medical attention (report to your doctor or health care professional if they continue or are bothersome):  · diarrhea  · stomach cramps  · throat irritation  This list may not describe all possible side effects. Call your doctor for medical advice about side effects. You may report side effects to FDA at 6-894-FDA-4273.  Where should I keep my medicine?  Keep out of the reach of children.  Store at room temperature between 15 and 30 degrees C (59 and 86 degrees F). Throw away any unused medicine after the expiration date.  NOTE: This sheet is a summary. It may not cover all possible information. If you have questions about this medicine, talk to your doctor, pharmacist, or health care provider.  © 2020 Elsevier/Gold Standard (2009-04-09 15:56:49)  Levetiracetam tablets  What is this medicine?  LEVETIRACETAM (yvan alycia antonette RA se morales) is an antiepileptic drug. It is used with other medicines to treat certain types of seizures.  This medicine may be used for other purposes; ask your health care provider or pharmacist if you have questions.  COMMON BRAND NAME(S): Portia Young  What should I tell my health care provider before I take this medicine?  They need to know if you have any of these  conditions:  · kidney disease  · suicidal thoughts, plans, or attempt; a previous suicide attempt by you or a family member  · an unusual or allergic reaction to levetiracetam, other medicines, foods, dyes, or preservatives  · pregnant or trying to get pregnant  · breast-feeding  How should I use this medicine?  Take this medicine by mouth with a glass of water. Follow the directions on the prescription label. Swallow the tablets whole. Do not crush or chew this medicine. You may take this medicine with or without food. Take your doses at regular intervals. Do not take your medicine more often than directed. Do not stop taking this medicine or any of your seizure medicines unless instructed by your doctor or health care professional. Stopping your medicine suddenly can increase your seizures or their severity.  A special MedGuide will be given to you by the pharmacist with each prescription and refill. Be sure to read this information carefully each time.  Contact your pediatrician or health care professional regarding the use of this medication in children. While this drug may be prescribed for children as young as 4 years of age for selected conditions, precautions do apply.  Overdosage: If you think you have taken too much of this medicine contact a poison control center or emergency room at once.  NOTE: This medicine is only for you. Do not share this medicine with others.  What if I miss a dose?  If you miss a dose, take it as soon as you can. If it is almost time for your next dose, take only that dose. Do not take double or extra doses.  What may interact with this medicine?  This medicine may interact with the following medications:  · carbamazepine  · colesevelam  · probenecid  · sevelamer  This list may not describe all possible interactions. Give your health care provider a list of all the medicines, herbs, non-prescription drugs, or dietary supplements you use. Also tell them if you smoke, drink alcohol,  or use illegal drugs. Some items may interact with your medicine.  What should I watch for while using this medicine?  Visit your doctor or health care provider for a regular check on your progress. Wear a medical identification bracelet or chain to say you have epilepsy, and carry a card that lists all your medications.  This medicine may cause serious skin reactions. They can happen weeks to months after starting the medicine. Contact your health care provider right away if you notice fevers or flu-like symptoms with a rash. The rash may be red or purple and then turn into blisters or peeling of the skin. Or, you might notice a red rash with swelling of the face, lips or lymph nodes in your neck or under your arms.  It is important to take this medicine exactly as instructed by your health care provider. When first starting treatment, your dose may need to be adjusted. It may take weeks or months before your dose is stable. You should contact your doctor or health care provider if your seizures get worse or if you have any new types of seizures.  You may get drowsy or dizzy. Do not drive, use machinery, or do anything that needs mental alertness until you know how this medicine affects you. Do not stand or sit up quickly, especially if you are an older patient. This reduces the risk of dizzy or fainting spells. Alcohol may interfere with the effect of this medicine. Avoid alcoholic drinks.  The use of this medicine may increase the chance of suicidal thoughts or actions. Pay special attention to how you are responding while on this medicine. Any worsening of mood, or thoughts of suicide or dying should be reported to your health care provider right away.  Women who become pregnant while using this medicine may enroll in the [place] Antiepileptic Drug Pregnancy Registry by calling 1-956.560.7377. This registry collects information about the safety of antiepileptic drug use during pregnancy.  What side  effects may I notice from receiving this medicine?  Side effects that you should report to your doctor or health care professional as soon as possible:  · allergic reactions like skin rash, itching or hives, swelling of the face, lips, or tongue  · breathing problems  · dark urine  · general ill feeling or flu-like symptoms  · problems with balance, talking, walking  · rash, fever, and swollen lymph nodes  · redness, blistering, peeling or loosening of the skin, including inside the mouth  · unusually weak or tired  · worsening of mood, thoughts or actions of suicide or dying  · yellowing of the eyes or skin  Side effects that usually do not require medical attention (report to your doctor or health care professional if they continue or are bothersome):  · diarrhea  · dizzy, drowsy  · headache  · loss of appetite  This list may not describe all possible side effects. Call your doctor for medical advice about side effects. You may report side effects to FDA at 6-849-FDA-2253.  Where should I keep my medicine?  Keep out of reach of children.  Store at room temperature between 15 and 30 degrees C (59 and 86 degrees F). Throw away any unused medicine after the expiration date.  NOTE: This sheet is a summary. It may not cover all possible information. If you have questions about this medicine, talk to your doctor, pharmacist, or health care provider.  © 2020 Elsevier/Gold Standard (2020-03-20 15:23:36)  Oxycodone tablets or capsules  What is this medicine?  OXYCODONE (ox i KOE done) is a pain reliever. It is used to treat moderate to severe pain.  This medicine may be used for other purposes; ask your health care provider or pharmacist if you have questions.  COMMON BRAND NAME(S): Dazidox, Endocodone, Oxaydo, OXECTA, OxyIR, Percolone, Roxicodone, Roxybond  What should I tell my health care provider before I take this medicine?  They need to know if you have any of these conditions:  · [place]'s disease  · brain  tumor  · head injury  · heart disease  · history of drug or alcohol abuse problem  · if you often drink alcohol  · kidney disease  · liver disease  · lung or breathing disease, like asthma  · mental illness  · pancreatic disease  · seizures  · thyroid disease  · an unusual or allergic reaction to oxycodone, codeine, hydrocodone, morphine, other medicines, foods, dyes, or preservatives  · pregnant or trying to get pregnant  · breast-feeding  How should I use this medicine?  Take this medicine by mouth with a glass of water. Follow the directions on the prescription label. You can take it with or without food. If it upsets your stomach, take it with food. Take your medicine at regular intervals. Do not take it more often than directed. Do not stop taking except on your doctor's advice.  Some brands of this medicine, like Oxecta, have special instructions. Ask your doctor or pharmacist if these directions are for you: Do not cut, crush or chew this medicine. Swallow only one tablet at a time. Do not wet, soak, or lick the tablet before you take it.  A special MedGuide will be given to you by the pharmacist with each prescription and refill. Be sure to read this information carefully each time.  Talk to your pediatrician regarding the use of this medicine in children. Special care may be needed.  Overdosage: If you think you have taken too much of this medicine contact a poison control center or emergency room at once.  NOTE: This medicine is only for you. Do not share this medicine with others.  What if I miss a dose?  If you miss a dose, take it as soon as you can. If it is almost time for your next dose, take only that dose. Do not take double or extra doses.  What may interact with this medicine?  This medicine may interact with the following medications:  · alcohol  · antihistamines for allergy, cough and cold  · antiviral medicines for HIV or AIDS  · atropine  · certain antibiotics like clarithromycin,  erythromycin, linezolid, rifampin  · certain medicines for anxiety or sleep  · certain medicines for bladder problems like oxybutynin, tolterodine  · certain medicines for depression like amitriptyline, fluoxetine, sertraline  · certain medicines for fungal infections like ketoconazole, itraconazole, voriconazole  · certain medicines for migraine headache like almotriptan, eletriptan, frovatriptan, naratriptan, rizatriptan, sumatriptan, zolmitriptan  · certain medicines for nausea or vomiting like dolasetron, ondansetron, palonosetron  · certain medicines for Parkinson's disease like benztropine, trihexyphenidyl  · certain medicines for seizures like phenobarbital, phenytoin, primidone  · certain medicines for stomach problems like dicyclomine, hyoscyamine  · certain medicines for travel sickness like scopolamine  · diuretics  · general anesthetics like halothane, isoflurane, methoxyflurane, propofol  · ipratropium  · local anesthetics like lidocaine, pramoxine, tetracaine  · MAOIs like Carbex, Eldepryl, Marplan, Nardil, and Parnate  · medicines that relax muscles for surgery  · methylene blue  · nilotinib  · other narcotic medicines for pain or cough  · phenothiazines like chlorpromazine, mesoridazine, prochlorperazine, thioridazine  This list may not describe all possible interactions. Give your health care provider a list of all the medicines, herbs, non-prescription drugs, or dietary supplements you use. Also tell them if you smoke, drink alcohol, or use illegal drugs. Some items may interact with your medicine.  What should I watch for while using this medicine?  Tell your doctor or health care professional if your pain does not go away, if it gets worse, or if you have new or a different type of pain. You may develop tolerance to the medicine. Tolerance means that you will need a higher dose of the medicine for pain relief. Tolerance is normal and is expected if you take this medicine for a long time.  Do not  suddenly stop taking your medicine because you may develop a severe reaction. Your body becomes used to the medicine. This does NOT mean you are addicted. Addiction is a behavior related to getting and using a drug for a non-medical reason. If you have pain, you have a medical reason to take pain medicine. Your doctor will tell you how much medicine to take. If your doctor wants you to stop the medicine, the dose will be slowly lowered over time to avoid any side effects.  There are different types of narcotic medicines (opiates). If you take more than one type at the same time or if you are taking another medicine that also causes drowsiness, you may have more side effects. Give your health care provider a list of all medicines you use. Your doctor will tell you how much medicine to take. Do not take more medicine than directed. Call emergency for help if you have problems breathing or unusual sleepiness.  You may get drowsy or dizzy. Do not drive, use machinery, or do anything that needs mental alertness until you know how the medicine affects you. Do not stand or sit up quickly, especially if you are an older patient. This reduces the risk of dizzy or fainting spells. Alcohol may interfere with the effect of this medicine. Avoid alcoholic drinks.  This medicine will cause constipation. Try to have a bowel movement at least every 2 to 3 days. If you do not have a bowel movement for 3 days, call your doctor or health care professional.  Your mouth may get dry. Chewing sugarless gum or sucking hard candy, and drinking plenty of water may help. Contact your doctor if the problem does not go away or is severe.  What side effects may I notice from receiving this medicine?  Side effects that you should report to your doctor or health care professional as soon as possible:  · allergic reactions like skin rash, itching or hives, swelling of the face, lips, or tongue  · breathing problems  · confusion  · signs and symptoms  of low blood pressure like dizziness; feeling faint or lightheaded, falls; unusually weak or tired  · trouble passing urine or change in the amount of urine  · trouble swallowing  Side effects that usually do not require medical attention (report to your doctor or health care professional if they continue or are bothersome):  · constipation  · dry mouth  · nausea, vomiting  · tiredness  This list may not describe all possible side effects. Call your doctor for medical advice about side effects. You may report side effects to FDA at 3-414-JOH-8891.  Where should I keep my medicine?  Keep out of the reach of children. This medicine can be abused. Keep your medicine in a safe place to protect it from theft. Do not share this medicine with anyone. Selling or giving away this medicine is dangerous and against the law.  Store at room temperature between 15 and 30 degrees C (59 and 86 degrees F). Protect from light. Keep container tightly closed.  This medicine may cause harm and death if it is taken by other adults, children, or pets. Return medicine that has not been used to an official disposal site. Contact the [facility] at [phone] or your [facility] government to find a site. If you cannot return the medicine, flush it down the toilet. Do not use the medicine after the expiration date.  NOTE: This sheet is a summary. It may not cover all possible information. If you have questions about this medicine, talk to your doctor, pharmacist, or health care provider.  © 2020 [employer]vier/Gold Standard (2018-04-24 16:13:10)

## 2021-03-05 NOTE — DISCHARGE SUMMARY
DATE OF ADMISSION:  03/03/2021   DATE OF DISCHARGE:  03/05/2021     ADMITTING DIAGNOSES:  Known breast cancer with multiple intracranial   metastatic lesions, one of which is not responsive to her current   chemotherapeutic regimen.     COURSE OF HOSPITALIZATION:  The patient was admitted to [facility].  On date of admission, she underwent a right-sided pterional   craniotomy for resection of right-sided middle frontal gyrus tumor with Dr. German Snowden.     Postoperatively, the patient was admitted to the intensive care unit.  She did   have a postoperative MRI with SRS protocol.  Her neurological exam was stable   and she was transferred out of the intensive care unit on postoperative day   #1.  She is oriented x4.  Her motor strength is 5/5 in her upper and lower   extremities.  Her incision was clean, dry and intact with staples.  She is   voiding.  She is passing flatus.  She has not had a bowel movement yet.  She   is ambulatory.  She has been cleared by therapies for discharge.  Her vital   signs and labs are stable.  Her pathology did come back metastatic   adenocarcinoma consistent with breast cancer.  The patient is meeting all   criteria and will be discharged on postoperative day #2.     DISCHARGE MEDICATIONS:  1.  Keppra 500 mg p.o. b.i.d. x5 more days, #10, no refills.  2.  Decadron taper, take 4 mg p.o. on 03/05/2021 at 6:00 p.m., then on   03/06/2021 take half a tablet p.o. b.i.d. x1 day, then on 03/07/2021 take a   half a tablet p.o. daily, then stop, #3, no refills.  3.  Pepcid 20 mg 1 p.o. b.i.d. while on steroids.  4.  No refills.  5.  Oxycodone 5 mg 1 p.o. every 6 hours p.r.n. pain, #28, no refills.  All of these prescriptions were sent electronically through our office   computer system.  Her  was reviewed.  No aberrant behaviors identified.    ORT and consent are on file.     DISCHARGE INSTRUCTIONS:  The patient will follow up with our office at 2 and 6   weeks  postoperatively.  She also has a followup already arranged with Dr. Granado.  She is reminded no aspirin or anti-inflammatory products, no driving   until cleared.  Any further questions or concerns, she is instructed not   hesitate to contact our office.        ______________________________  HALEY LAW        ______________________________  MD VLADIMIR RIVERA/JAE    DD:  03/05/2021 10:38  DT:  03/05/2021 10:59    Job#:  [id number]

## 2021-03-05 NOTE — PROGRESS NOTES
Mobility Progress Note    Surgery patient: Yes  Date of surgery: 03/03/2021  Ambulated 50 ft on day of surgery? (N/A if patient did not undergo surgery today): N/A  Number of times ambulated 50 feet or greater today: 1  Patient has been up to chair, edge of bed or HOB 90 degrees for all meals?: Yes  Goal met? (goal is ambulating at least 50 feet 2 times on day shift, one time on night shift): Yes  If patient did not meet mobility goal, why?: N/A

## 2021-03-05 NOTE — PROGRESS NOTES
Discharge instructions reviewed and questions answered. PIVs removed and catheter intact. Pt to call and make f/u with APRN with neurosurgery for 2 weeks and Dr. Snowden in 6 weeks. Pt ambulated to car and transported home by daughter.

## 2021-03-05 NOTE — PROGRESS NOTES
Neurosurgery Progress Note    Subjective:  Pt awake, alert  Doing well  No sig complaints  Has right eye swelling, sorness to jaw  Voiding, no bm yet      Exam:    A&O x3, GCS 15  PERRL, EOMI  Face symm, tongue midline  MOORE with FS  Inc c/d/i with staples, dressing removed    BP  Min: 100/48  Max: 124/44  Pulse  Av  Min: 56  Max: 69  Resp  Av.4  Min: 16  Max: 17  Temp  Av.7 °C (98 °F)  Min: 36.2 °C (97.2 °F)  Max: 37.3 °C (99.2 °F)  SpO2  Av.2 %  Min: 94 %  Max: 98 %    No data recorded    Recent Labs     21  0540 21  0315   WBC 3.6* 4.5*   RBC 2.44* 2.49*   HEMOGLOBIN 9.3* 9.4*   HEMATOCRIT 27.3* 28.2*   .9* 113.3*   MCH 38.1* 37.8*   MCHC 34.1 33.3*   RDW 56.3* 58.8*   PLATELETCT 70* 74*   MPV 10.3 10.7     Recent Labs     21  0540 21  0315   SODIUM 134* 136   POTASSIUM 3.8 4.2   CHLORIDE 104 104   CO2 20 25   GLUCOSE 166* 172*   BUN 12 13   CREATININE 0.59 0.70   CALCIUM 7.1* 8.6               Intake/Output       21 - 21 0659 21 - 2159       Total 1900-0659 Total       Intake    P.O.  360  840 1200  240  -- 240    P.O.  240 -- 240    I.V.  500  -- 500  --  -- --    Volume (mL) (0.9 % NaCl with KCl 20 mEq infusion) 500 -- 500 -- -- --    Total Intake  240 -- 240       Output    Urine  1000  -- 1000  --  -- --    Number of Times Voided 2 x 2 x 4 x 1 x -- 1 x    Urine Void (mL) 400 -- 400 -- -- --    Output (mL) ([REMOVED] Urethral Catheter Latex 16 Fr.) 600 -- 600 -- -- --    Total Output 1000 -- 1000 -- -- --       Net I/O     -140 840 700 240 -- 240            Intake/Output Summary (Last 24 hours) at 3/5/2021 1015  Last data filed at 3/5/2021 0851  Gross per 24 hour   Intake 1620 ml   Output 1000 ml   Net 620 ml            • levETIRAcetam  500 mg BID   • dexamethasone  4 mg Q12HRS    Followed by   • [START ON 3/6/2021] dexamethasone  2 mg Q12HRS   • HYDROmorphone  0.5 mg  Q3HRS PRN   • atorvastatin  80 mg DAILY   • oyster shell calcium/vitamin D  2 tablet BID   • Abemaciclib  150 mg BID   • Pharmacy Consult Request  1 Each PHARMACY TO DOSE   • MD ALERT...DO NOT ADMINISTER NSAIDS or ASPIRIN unless ORDERED By Neurosurgery  1 Each PRN   • ondansetron  4 mg Q4HRS PRN   • diphenhydrAMINE  25 mg Q6HRS PRN   • scopolamine  1 Patch Q72HRS PRN   • labetalol  10 mg Q HOUR PRN   • hydrALAZINE  10 mg Q HOUR PRN   • cloNIDine  0.1 mg Q4HRS PRN   • docusate sodium  100 mg BID   • senna-docusate  1 tablet Nightly   • senna-docusate  1 tablet Q24HRS PRN   • polyethylene glycol/lytes  1 Packet BID PRN   • magnesium hydroxide  30 mL QDAY PRN   • bisacodyl  10 mg Q24HRS PRN   • fleet  1 Each Once PRN   • artificial tears  1 Application Q8HRS   • 0.9 % NaCl with KCl 20 mEq 1,000 mL   Continuous   • oxyCODONE immediate-release  5 mg Q3HRS PRN    Or   • oxyCODONE immediate-release  10 mg Q3HRS PRN       Assessment and Plan:  Hospital day # 3  POD# 2 right crani for tumor  Path: met adenocarcinoma c/w breast ca  Chemical prophylactic DVT therapy: No  Start date/time: n/a  MRI brain completed   Bowel protocol  Keppra x 7 d, dec taper  DC home today  Will send oxy, keppra, dec to [facility] on [address] electronically through our office.  F/u our office 2 weeks  Has f/u arranged with Dr. Granado

## 2021-03-05 NOTE — CARE PLAN
Problem: Pain Management  Goal: Pain level will decrease to patient's comfort goal  Outcome: PROGRESSING AS EXPECTED  Note: Pain medication given per MAR.     Problem: Communication  Goal: The ability to communicate needs accurately and effectively will improve  Outcome: PROGRESSING AS EXPECTED  Note: Pt updated on POC. All questions answered. Pt uses call light appropriately to communicate needs.

## 2021-03-05 NOTE — PROGRESS NOTES
2 RN Skin Check    2 RN skin check complete.   Devices in place: SCDs.  Skin assessed under devices: yes.  Confirmed pressure ulcers found on: none found.  New potential pressure ulcers noted on none. Wound consult placed No.  The following interventions in place Pillows     Performed with KIRBY Nash. Skin intact. Craniotomy site on right side of skull, staples intact, dressing in place.

## 2021-03-05 NOTE — PROGRESS NOTES
Pt AAOx4, VSS on room air. Pt transferred to the discharge lounge via wheelchair with UC. Pt left with all belongings.

## 2021-03-15 ENCOUNTER — HOSPITAL ENCOUNTER (OUTPATIENT)
Dept: RADIATION ONCOLOGY | Facility: MEDICAL CENTER | Age: 56
End: 2021-03-31
Attending: RADIOLOGY
Payer: COMMERCIAL

## 2021-03-15 VITALS — BODY MASS INDEX: 23.32 KG/M2 | WEIGHT: 140 LBS | HEIGHT: 65 IN

## 2021-03-15 DIAGNOSIS — C79.31 BRAIN METASTASES: ICD-10-CM

## 2021-03-15 PROCEDURE — 99222 1ST HOSP IP/OBS MODERATE 55: CPT | Performed by: RADIOLOGY

## 2021-03-15 RX ORDER — CEPHALEXIN 500 MG/1
500 CAPSULE ORAL
COMMUNITY
Start: 2021-03-10 | End: 2021-07-16

## 2021-03-15 ASSESSMENT — FIBROSIS 4 INDEX: FIB4 SCORE: 3.8

## 2021-03-15 ASSESSMENT — PAIN SCALES - GENERAL: PAINLEVEL: NO PAIN

## 2021-03-15 NOTE — CT SIMULATION
PATIENT NAME Michelle Jerry Diana   PRIMARY PHYSICIAN Pcp Pt States None [id number]   REFERRING PHYSICIAN Mi Granado M.D. 1965     Breast cancer metastasized to brain, left (HCC)  Staging form: BREAST AJCC V7  - Pathologic: Stage IV (T2, N0, M1) - Signed by Merlene Darnell M.D. on 2/3/2020  Laterality: Left  Method of detection of distant metastases: Radiographic  Biopsy of metastatic site performed: No  Source of metastatic specimen: Brain/CNS  Histologic grade (G): G2  Estrogen receptor status: Positive  Progesterone receptor status: Negative  HER2 status: Negative         Treatment Planning CT Simulation      Order Questions     Question Answer Comment    Is this for a new course of treatment? Yes     Is this an Addendum? No     Simulation Status Initial     Treatment Technique SRS     Treatment Pattern/Frequency Daily     Concurrent Chemotherapy No     CT Technique 3D     Slice Thickness 1mm     Scan Extent Brain     Treatment Device(s) SRS Mask     Patient Attire Gown     Patient Position Supine     Patient Orientation Head First     Treatment Machine No preference     Treatment Image Guidance CBCT     Image Guidance Match Bone     Other Orders Special Tx Procedure

## 2021-03-15 NOTE — PROGRESS NOTES
Telemedicine Video Visit: Established Patient   This Remote Face to Face encounter was conducted via Zoom. Given the importance of social distancing and other strategies recommended to reduce the risk of COVID-19 transmission, I am providing medical care to this patient via audio/video visit in place of an in person visit at the request of the patient. Verbal consent to telehealth, risks, benefits, and consequences were discussed. Patient retains the right to withdraw at any time. All existing confidentiality protections apply. The patient has access to all transmitted medical information. No dissemination of any patient images or information to other entities without further written consent.  Subjective:   No chief complaint on file.      Michelle Diana is a 55 y.o. female presenting for evaluation and management of:    Had a static estrogen positive breast cancer to the brain    ROS No new complaints just has clips in from the recent neurosurgery on her right forehead in the hairline  Denies any recent fevers or chills. No nausea or vomiting. No chest pains or shortness of breath.     No Known Allergies    Current medicines (including changes today)  Current Outpatient Medications   Medication Sig Dispense Refill   • cephALEXin (KEFLEX) 500 MG Cap Take 500 mg by mouth.     • docusate sodium 100 MG Cap Take 100 mg by mouth 2 Times a Day. 60 capsule    • denosumab (XGEVA) 120 MG/1.7ML injection Inject 120 mg under the skin every 3 months.     • VERZENIO 150 MG Tab Take 150 mg by mouth 2 Times a Day.     • fulvestrant (FASLODEX) 250 MG/5ML Solution injection Inject 5 mL into the shoulder, thigh, or buttocks Q30 DAYS.     • atorvastatin (LIPITOR) 80 MG tablet Take 80 mg by mouth every day.     • CALCIUM PO Take 600 mg by mouth 2 times a day.     • dexamethasone (DECADRON) 4 MG Tab Take 1 tablet by mouth every 12 hours. (Patient not taking: Reported on 3/15/2021) 2 tablet 0   • dexamethasone (DECADRON) 2 MG  "tablet Take 1 tablet by mouth every 12 hours. (Patient not taking: Reported on 3/15/2021) 2 tablet 0   • levETIRAcetam (KEPPRA) 500 MG Tab Take 1 tablet by mouth 2 Times a Day. (Patient not taking: Reported on 3/15/2021) 10 tablet 0     No current facility-administered medications for this encounter.       Patient Active Problem List    Diagnosis Date Noted   • Brain mass 11/14/2016     Priority: High   • Vasogenic brain edema (HCC) 11/14/2016     Priority: High   • Breast cancer metastasized to brain, left (HCC) 11/14/2016     Priority: High   • Unsteady gait 11/14/2016     Priority: Low   • Brain metastases (HCC) 10/23/2020       Family History   Problem Relation Age of Onset   • Cancer Father         Prostate cancer/colon cancer       She  has a past medical history of Cancer (HCC), Chemotherapy adverse reaction, Heart burn, High cholesterol, and History of radiation therapy.  She  has a past surgical history that includes lumpectomy (Left); lymph node excision (Left, 6/26/2018); other (2017); other (2016); and craniotomy stealth (Right, 3/3/2021).       Objective:   Vitals obtained by patient:  Ht 1.651 m (5' 5\")   Wt 63.5 kg (140 lb)   BMI 23.30 kg/m²     Physical Exam:Well-appearing alert and oriented x3 in no apparent distress clips look clean on the right forehead hairline  Constitutional: Alert, no distress, well-groomed.  Skin: No rashes in visible areas.  Eye: Round. Conjunctiva clear, lids normal. No icterus.   ENMT: Lips pink without lesions, good dentition, moist mucous membranes. Phonation normal.  Neck: No masses, no thyromegaly. Moves freely without pain.  CV: Pulse as reported by patient  Respiratory: Unlabored respiratory effort, no cough or audible wheeze  Psych: Alert and oriented x3, normal affect and mood.       Assessment and Plan:   The following treatment plan was discussed:     A 55 y.o. female with metastatic estrogen positive breast cancer to the brain now with 5 new brain metastasis " status post SRS complete 2/26/2022 a left mary lesion, right and left parietal occipital lesions, right temporal lesion, and right occipital lesion.  She had previously been treated to a left occipital lesion following resection and stereotactic radiotherapy with [employer] in 2017.Now status post stereotactic radiosurgery to multiple lesions complete 11/10/2020.    Currently on Verzenio and letrozole.Now status post resection of a right frontal metastasis on 3 3 showing again identical markers of metastatic breast cancer.We discussed our options at this point.  I explained she was presented to get a neurosurgery tumor board with Dr. Snowden this morning.  Have also spoken with Dr. Granado.  The plan is for us to treat the tumor bed that was resected on 3 3 as well as any additional metastatic lesions that we see. She plans to go to [facility] so we are going to do the SRS scan and simulation a few days after she returns to do the treatment the first or second week in April.      Follow-up: We will put orders in for the SRS scan as well as simulation.    Face to Face Video Visit:   I spent 15 minutes with patient/guardian and I conducted this visit with audio and video present.  Merlene Darnell M.D.

## 2021-03-15 NOTE — NON-PROVIDER
"Patient was seen today through Virtual Visit with Dr. Darnell for follow up.  Vitals signs and weight were obtained and pain assessment was completed.  Allergies and medications were reviewed with the patient.  Toxicities of treatment assessed.     Vitals/Pain:  Vitals:    03/15/21 1408   Weight: 63.5 kg (140 lb)   Height: 1.651 m (5' 5\")   Pain Score: No pain        Allergies:   Patient has no known allergies.    Current Medications:  Current Outpatient Medications   Medication Sig Dispense Refill   • cephALEXin (KEFLEX) 500 MG Cap Take 500 mg by mouth.     • docusate sodium 100 MG Cap Take 100 mg by mouth 2 Times a Day. 60 capsule    • denosumab (XGEVA) 120 MG/1.7ML injection Inject 120 mg under the skin every 3 months.     • VERZENIO 150 MG Tab Take 150 mg by mouth 2 Times a Day.     • fulvestrant (FASLODEX) 250 MG/5ML Solution injection Inject 5 mL into the shoulder, thigh, or buttocks Q30 DAYS.     • atorvastatin (LIPITOR) 80 MG tablet Take 80 mg by mouth every day.     • CALCIUM PO Take 600 mg by mouth 2 times a day.     • dexamethasone (DECADRON) 4 MG Tab Take 1 tablet by mouth every 12 hours. (Patient not taking: Reported on 3/15/2021) 2 tablet 0   • dexamethasone (DECADRON) 2 MG tablet Take 1 tablet by mouth every 12 hours. (Patient not taking: Reported on 3/15/2021) 2 tablet 0   • levETIRAcetam (KEPPRA) 500 MG Tab Take 1 tablet by mouth 2 Times a Day. (Patient not taking: Reported on 3/15/2021) 10 tablet 0     No current facility-administered medications for this encounter.           Suzette Meza, Med Ass't    "

## 2021-04-01 ENCOUNTER — HOSPITAL ENCOUNTER (OUTPATIENT)
Dept: RADIATION ONCOLOGY | Facility: MEDICAL CENTER | Age: 56
End: 2021-04-30
Attending: RADIOLOGY
Payer: COMMERCIAL

## 2021-04-06 ENCOUNTER — HOSPITAL ENCOUNTER (OUTPATIENT)
Dept: RADIATION ONCOLOGY | Facility: MEDICAL CENTER | Age: 56
End: 2021-04-06
Payer: COMMERCIAL

## 2021-04-06 ENCOUNTER — HOSPITAL ENCOUNTER (OUTPATIENT)
Dept: RADIOLOGY | Facility: MEDICAL CENTER | Age: 56
End: 2021-04-06
Attending: RADIOLOGY
Payer: COMMERCIAL

## 2021-04-06 DIAGNOSIS — C79.31 BRAIN METASTASES: ICD-10-CM

## 2021-04-06 PROCEDURE — 77470 SPECIAL RADIATION TREATMENT: CPT | Mod: 26 | Performed by: RADIOLOGY

## 2021-04-06 PROCEDURE — A9576 INJ PROHANCE MULTIPACK: HCPCS | Performed by: RADIOLOGY

## 2021-04-06 PROCEDURE — 77334 RADIATION TREATMENT AID(S): CPT | Performed by: RADIOLOGY

## 2021-04-06 PROCEDURE — 77290 THER RAD SIMULAJ FIELD CPLX: CPT | Performed by: RADIOLOGY

## 2021-04-06 PROCEDURE — 77334 RADIATION TREATMENT AID(S): CPT | Mod: 26 | Performed by: RADIOLOGY

## 2021-04-06 PROCEDURE — 700117 HCHG RX CONTRAST REV CODE 255: Performed by: RADIOLOGY

## 2021-04-06 PROCEDURE — 70553 MRI BRAIN STEM W/O & W/DYE: CPT

## 2021-04-06 PROCEDURE — 77470 SPECIAL RADIATION TREATMENT: CPT | Performed by: RADIOLOGY

## 2021-04-06 PROCEDURE — 77290 THER RAD SIMULAJ FIELD CPLX: CPT | Mod: 26 | Performed by: RADIOLOGY

## 2021-04-06 PROCEDURE — 77263 THER RADIOLOGY TX PLNG CPLX: CPT | Performed by: RADIOLOGY

## 2021-04-06 RX ADMIN — GADOTERIDOL 15 ML: 279.3 INJECTION, SOLUTION INTRAVENOUS at 07:26

## 2021-04-08 PROCEDURE — 77370 RADIATION PHYSICS CONSULT: CPT | Performed by: RADIOLOGY

## 2021-04-09 PROCEDURE — 77300 RADIATION THERAPY DOSE PLAN: CPT | Performed by: RADIOLOGY

## 2021-04-09 PROCEDURE — 77334 RADIATION TREATMENT AID(S): CPT | Mod: 26 | Performed by: RADIOLOGY

## 2021-04-09 PROCEDURE — 77300 RADIATION THERAPY DOSE PLAN: CPT | Mod: 26 | Performed by: RADIOLOGY

## 2021-04-09 PROCEDURE — 77334 RADIATION TREATMENT AID(S): CPT | Performed by: RADIOLOGY

## 2021-04-09 PROCEDURE — 77295 3-D RADIOTHERAPY PLAN: CPT | Performed by: RADIOLOGY

## 2021-04-09 PROCEDURE — 77295 3-D RADIOTHERAPY PLAN: CPT | Mod: 26 | Performed by: RADIOLOGY

## 2021-04-12 ENCOUNTER — HOSPITAL ENCOUNTER (OUTPATIENT)
Dept: RADIATION ONCOLOGY | Facility: MEDICAL CENTER | Age: 56
End: 2021-04-12
Payer: COMMERCIAL

## 2021-04-12 LAB
CHEMOTHERAPY INFUSION START DATE: NORMAL
CHEMOTHERAPY RECORDS: 2700
CHEMOTHERAPY RECORDS: 9
CHEMOTHERAPY RECORDS: NORMAL
CHEMOTHERAPY RX CANCER: NORMAL
DATE 1ST CHEMO CANCER: NORMAL
RAD ONC ARIA COURSE LAST TREATMENT DATE: NORMAL
RAD ONC ARIA COURSE TREATMENT ELAPSED DAYS: NORMAL
RAD ONC ARIA REFERENCE POINT DOSAGE GIVEN TO DATE: 11.05
RAD ONC ARIA REFERENCE POINT DOSAGE GIVEN TO DATE: 9
RAD ONC ARIA REFERENCE POINT ID: NORMAL
RAD ONC ARIA REFERENCE POINT ID: NORMAL
RAD ONC ARIA REFERENCE POINT SESSION DOSAGE GIVEN: 11.05
RAD ONC ARIA REFERENCE POINT SESSION DOSAGE GIVEN: 9

## 2021-04-12 PROCEDURE — 77373 STRTCTC BDY RAD THER TX DLVR: CPT | Performed by: RADIOLOGY

## 2021-04-12 PROCEDURE — 77280 THER RAD SIMULAJ FIELD SMPL: CPT | Mod: 26 | Performed by: RADIOLOGY

## 2021-04-12 PROCEDURE — 77435 SBRT MANAGEMENT: CPT | Performed by: RADIOLOGY

## 2021-04-12 PROCEDURE — 77280 THER RAD SIMULAJ FIELD SMPL: CPT | Performed by: RADIOLOGY

## 2021-04-13 ENCOUNTER — HOSPITAL ENCOUNTER (OUTPATIENT)
Dept: RADIATION ONCOLOGY | Facility: MEDICAL CENTER | Age: 56
End: 2021-04-13
Payer: COMMERCIAL

## 2021-04-13 LAB
CHEMOTHERAPY INFUSION START DATE: NORMAL
CHEMOTHERAPY RECORDS: 2700
CHEMOTHERAPY RECORDS: 9
CHEMOTHERAPY RECORDS: NORMAL
CHEMOTHERAPY RX CANCER: NORMAL
DATE 1ST CHEMO CANCER: NORMAL
RAD ONC ARIA COURSE LAST TREATMENT DATE: NORMAL
RAD ONC ARIA COURSE TREATMENT ELAPSED DAYS: NORMAL
RAD ONC ARIA REFERENCE POINT DOSAGE GIVEN TO DATE: 18
RAD ONC ARIA REFERENCE POINT DOSAGE GIVEN TO DATE: 22.1
RAD ONC ARIA REFERENCE POINT ID: NORMAL
RAD ONC ARIA REFERENCE POINT ID: NORMAL
RAD ONC ARIA REFERENCE POINT SESSION DOSAGE GIVEN: 11.05
RAD ONC ARIA REFERENCE POINT SESSION DOSAGE GIVEN: 9

## 2021-04-13 PROCEDURE — 77280 THER RAD SIMULAJ FIELD SMPL: CPT | Mod: 26 | Performed by: RADIOLOGY

## 2021-04-13 PROCEDURE — 77373 STRTCTC BDY RAD THER TX DLVR: CPT | Performed by: RADIOLOGY

## 2021-04-13 PROCEDURE — 77336 RADIATION PHYSICS CONSULT: CPT | Performed by: RADIOLOGY

## 2021-04-13 PROCEDURE — 77280 THER RAD SIMULAJ FIELD SMPL: CPT | Performed by: RADIOLOGY

## 2021-04-14 ENCOUNTER — HOSPITAL ENCOUNTER (OUTPATIENT)
Dept: RADIATION ONCOLOGY | Facility: MEDICAL CENTER | Age: 56
End: 2021-04-14
Payer: COMMERCIAL

## 2021-04-14 ENCOUNTER — HOSPITAL ENCOUNTER (OUTPATIENT)
Dept: LAB | Facility: MEDICAL CENTER | Age: 56
End: 2021-04-14
Attending: INTERNAL MEDICINE
Payer: COMMERCIAL

## 2021-04-14 VITALS
OXYGEN SATURATION: 100 % | HEART RATE: 76 BPM | BODY MASS INDEX: 23.85 KG/M2 | DIASTOLIC BLOOD PRESSURE: 70 MMHG | SYSTOLIC BLOOD PRESSURE: 143 MMHG | WEIGHT: 143.3 LBS | TEMPERATURE: 98.6 F

## 2021-04-14 DIAGNOSIS — C79.31 BRAIN METASTASES: ICD-10-CM

## 2021-04-14 LAB
ALBUMIN SERPL BCP-MCNC: 4 G/DL (ref 3.2–4.9)
ALBUMIN/GLOB SERPL: 1.5 G/DL
ALP SERPL-CCNC: 62 U/L (ref 30–99)
ALT SERPL-CCNC: 17 U/L (ref 2–50)
ANION GAP SERPL CALC-SCNC: 9 MMOL/L (ref 7–16)
AST SERPL-CCNC: 15 U/L (ref 12–45)
BASOPHILS # BLD AUTO: 0.8 % (ref 0–1.8)
BASOPHILS # BLD: 0.02 K/UL (ref 0–0.12)
BILIRUB SERPL-MCNC: 0.3 MG/DL (ref 0.1–1.5)
BUN SERPL-MCNC: 19 MG/DL (ref 8–22)
CALCIUM SERPL-MCNC: 9.8 MG/DL (ref 8.5–10.5)
CHEMOTHERAPY INFUSION START DATE: NORMAL
CHEMOTHERAPY RECORDS: 2700
CHEMOTHERAPY RECORDS: 9
CHEMOTHERAPY RECORDS: NORMAL
CHEMOTHERAPY RX CANCER: NORMAL
CHLORIDE SERPL-SCNC: 107 MMOL/L (ref 96–112)
CO2 SERPL-SCNC: 25 MMOL/L (ref 20–33)
CREAT SERPL-MCNC: 0.89 MG/DL (ref 0.5–1.4)
DATE 1ST CHEMO CANCER: NORMAL
EOSINOPHIL # BLD AUTO: 0.02 K/UL (ref 0–0.51)
EOSINOPHIL NFR BLD: 0.8 % (ref 0–6.9)
ERYTHROCYTE [DISTWIDTH] IN BLOOD BY AUTOMATED COUNT: 60 FL (ref 35.9–50)
GLOBULIN SER CALC-MCNC: 2.7 G/DL (ref 1.9–3.5)
GLUCOSE SERPL-MCNC: 95 MG/DL (ref 65–99)
HCT VFR BLD AUTO: 30.8 % (ref 37–47)
HGB BLD-MCNC: 10.3 G/DL (ref 12–16)
IMM GRANULOCYTES # BLD AUTO: 0 K/UL (ref 0–0.11)
IMM GRANULOCYTES NFR BLD AUTO: 0 % (ref 0–0.9)
LYMPHOCYTES # BLD AUTO: 1.5 K/UL (ref 1–4.8)
LYMPHOCYTES NFR BLD: 60.2 % (ref 22–41)
MAGNESIUM SERPL-MCNC: 2.2 MG/DL (ref 1.5–2.5)
MCH RBC QN AUTO: 38.4 PG (ref 27–33)
MCHC RBC AUTO-ENTMCNC: 33.4 G/DL (ref 33.6–35)
MCV RBC AUTO: 114.9 FL (ref 81.4–97.8)
MONOCYTES # BLD AUTO: 0.16 K/UL (ref 0–0.85)
MONOCYTES NFR BLD AUTO: 6.4 % (ref 0–13.4)
NEUTROPHILS # BLD AUTO: 0.79 K/UL (ref 2–7.15)
NEUTROPHILS NFR BLD: 31.8 % (ref 44–72)
NRBC # BLD AUTO: 0 K/UL
NRBC BLD-RTO: 0 /100 WBC
PHOSPHATE SERPL-MCNC: 3.5 MG/DL (ref 2.5–4.5)
PLATELET # BLD AUTO: 104 K/UL (ref 164–446)
PMV BLD AUTO: 10.4 FL (ref 9–12.9)
POTASSIUM SERPL-SCNC: 4.2 MMOL/L (ref 3.6–5.5)
PROT SERPL-MCNC: 6.7 G/DL (ref 6–8.2)
RAD ONC ARIA COURSE LAST TREATMENT DATE: NORMAL
RAD ONC ARIA COURSE TREATMENT ELAPSED DAYS: NORMAL
RAD ONC ARIA REFERENCE POINT DOSAGE GIVEN TO DATE: 27
RAD ONC ARIA REFERENCE POINT DOSAGE GIVEN TO DATE: 33.15
RAD ONC ARIA REFERENCE POINT ID: NORMAL
RAD ONC ARIA REFERENCE POINT ID: NORMAL
RAD ONC ARIA REFERENCE POINT SESSION DOSAGE GIVEN: 11.05
RAD ONC ARIA REFERENCE POINT SESSION DOSAGE GIVEN: 9
RBC # BLD AUTO: 2.68 M/UL (ref 4.2–5.4)
SODIUM SERPL-SCNC: 141 MMOL/L (ref 135–145)
WBC # BLD AUTO: 2.6 K/UL (ref 4.8–10.8)

## 2021-04-14 PROCEDURE — 77280 THER RAD SIMULAJ FIELD SMPL: CPT | Performed by: RADIOLOGY

## 2021-04-14 PROCEDURE — 86300 IMMUNOASSAY TUMOR CA 15-3: CPT

## 2021-04-14 PROCEDURE — 85025 COMPLETE CBC W/AUTO DIFF WBC: CPT

## 2021-04-14 PROCEDURE — 77280 THER RAD SIMULAJ FIELD SMPL: CPT | Mod: 26 | Performed by: RADIOLOGY

## 2021-04-14 PROCEDURE — 36415 COLL VENOUS BLD VENIPUNCTURE: CPT

## 2021-04-14 PROCEDURE — 83735 ASSAY OF MAGNESIUM: CPT

## 2021-04-14 PROCEDURE — 84100 ASSAY OF PHOSPHORUS: CPT

## 2021-04-14 PROCEDURE — 80053 COMPREHEN METABOLIC PANEL: CPT

## 2021-04-14 PROCEDURE — 77373 STRTCTC BDY RAD THER TX DLVR: CPT | Performed by: RADIOLOGY

## 2021-04-14 ASSESSMENT — PAIN SCALES - GENERAL: PAINLEVEL: NO PAIN

## 2021-04-14 ASSESSMENT — FIBROSIS 4 INDEX: FIB4 SCORE: 3.8

## 2021-04-14 NOTE — ON TREATMENT VISIT
ON TREATMENT  NOTE  RADIATION ONCOLOGY DEPARTMENT    Patient name:  Michelle Diana    Primary Physician:  Pcp Pt States None MRN: [id number]  [facility]: [id number]   Referring physician:  Mi Granado M.D. : 1965, 55 y.o.     ENCOUNTER DATE:  21    DIAGNOSIS:    Breast cancer metastasized to brain, left (HCC)  Staging form: BREAST AJCC V7  - Pathologic: Stage IV (T2, N0, M1) - Signed by Merlene Darnell M.D. on 2/3/2020  Laterality: Left  Method of detection of distant metastases: Radiographic  Biopsy of metastatic site performed: No  Source of metastatic specimen: Brain/CNS  Histologic grade (G): G2  Estrogen receptor status: Positive  Progesterone receptor status: Negative  HER2 status: Negative      TREATMENT SUMMARY:  [facility]ria Treatment Information        Some values may be hidden. Unless noted otherwise, only the newest values recorded on each date are displayed.         Aria Treatment Summary 21   Course First Treatment Date 2021   Course Last Treatment Date 2021   4 Lesion SRT Plan from Course C4_4_lesion_SRT   Fraction 3 of 3   Elapsed Course Days 2 @ [id number]   Prescribed Fraction Dose 900 cGy   Prescribed Total Dose 2,700 cGy   4 Les SRT CP Reference Point from Course C4_4_lesion_SRT   Elapsed Course Days 2 @ [id number]   Session Dose 1,105 cGy   Total Dose 3,315 cGy   4 Lesion SRT Reference Point from Course C4_4_lesion_SRT   Elapsed Course Days 2 @ [id number]   Session Dose 900 cGy   Total Dose 2,700 cGy   12-15 SRT Plan from Course C3_4 Lesion SRT   12-15 SRT Reference Point from Course C3_4 Lesion SRT   12-15 SRT CP Reference Point from Course C3_4 Lesion SRT   6 Lesion SRT Plan from Course C2_SRSbrain   6 Lesion SRT Reference Point from Course C2_SRSbrain   6 Lesion SRT CP Reference Point from Course C2_SRSbrain   ,02,03 SRS Plan from Course C1_brain   L Rodolfo SRT Plan from Course C1_brain   R Temp SRS Plan from Course C1_brain   ,, SRS Reference  Point from Course C1_brain   01,02,03 SRS CP Reference Point from Course C1_brain   L Rodolfo SRT Reference Point from Course C1_brain   L Rodolfo SRT CP Reference Point from Course C1_brain   R Temp SRS Reference Point from Course C1_brain   R Temp SRS CP Reference Point from Course C1_brain             SUBJECTIVE:  Tolerating therapy without event        VITAL SIGNS:  KPS: 100, Fully active, able to carry on all pre-disease performed without restriction (ECOG equivalent 0)     Pain Assessment 3/15/2021 1/11/2021 10/23/2020   Pain Score 0 0 0   Some recent data might be hidden          PHYSICAL EXAM:  No change      Toxicity Assessment 8/12/2020   Toxicity Assessment Brain   Fatigue (lethargy, malaise, asthenia) None   Radiation Dermatitis None   Nausea None   Headache None   External Auditory Canal Normal   Dizziness/lightheadedness None   Memory loss Normal   Neuropathy - Motor Normal   Seizure None   Vertigo None           IMPRESSION:  Cancer Staging  Breast cancer metastasized to brain, left (HCC)  Staging form: BREAST AJCC V7  - Pathologic: Stage IV (T2, N0, M1) - Signed by Merlene Darnell M.D. on 2/3/2020      PLAN:  No change in treatment plan    Disposition:  Treatment plan reviewed. Questions answered. Continue therapy outlined.     Merlene Darnell M.D.    No orders of the defined types were placed in this encounter.

## 2021-04-15 LAB
CHEMOTHERAPY INFUSION START DATE: NORMAL
CHEMOTHERAPY INFUSION STOP DATE: NORMAL
CHEMOTHERAPY RECORDS: 2700
CHEMOTHERAPY RECORDS: 9
CHEMOTHERAPY RECORDS: NORMAL
CHEMOTHERAPY RX CANCER: NORMAL
DATE 1ST CHEMO CANCER: NORMAL
RAD ONC ARIA COURSE LAST TREATMENT DATE: NORMAL
RAD ONC ARIA COURSE TREATMENT ELAPSED DAYS: NORMAL
RAD ONC ARIA REFERENCE POINT DOSAGE GIVEN TO DATE: 27
RAD ONC ARIA REFERENCE POINT DOSAGE GIVEN TO DATE: 33.15
RAD ONC ARIA REFERENCE POINT ID: NORMAL
RAD ONC ARIA REFERENCE POINT ID: NORMAL

## 2021-04-17 LAB — CANCER AG27-29 SERPL-ACNC: 18.4 U/ML (ref 0–40)

## 2021-04-30 LAB
CHEMOTHERAPY INFUSION START DATE: NORMAL
CHEMOTHERAPY INFUSION STOP DATE: NORMAL
CHEMOTHERAPY RECORDS: 2500
CHEMOTHERAPY RECORDS: 5
CHEMOTHERAPY RECORDS: NORMAL
CHEMOTHERAPY RX CANCER: NORMAL
DATE 1ST CHEMO CANCER: NORMAL
RAD ONC ARIA COURSE LAST TREATMENT DATE: NORMAL
RAD ONC ARIA COURSE TREATMENT ELAPSED DAYS: NORMAL
RAD ONC ARIA REFERENCE POINT DOSAGE GIVEN TO DATE: 25
RAD ONC ARIA REFERENCE POINT DOSAGE GIVEN TO DATE: 29.28
RAD ONC ARIA REFERENCE POINT ID: NORMAL
RAD ONC ARIA REFERENCE POINT ID: NORMAL

## 2021-06-14 ENCOUNTER — HOSPITAL ENCOUNTER (OUTPATIENT)
Dept: LAB | Facility: MEDICAL CENTER | Age: 56
End: 2021-06-14
Attending: NURSE PRACTITIONER
Payer: COMMERCIAL

## 2021-06-14 PROCEDURE — 86300 IMMUNOASSAY TUMOR CA 15-3: CPT

## 2021-06-17 LAB — CANCER AG27-29 SERPL-ACNC: 26.4 U/ML (ref 0–40)

## 2021-06-23 ENCOUNTER — HOSPITAL ENCOUNTER (OUTPATIENT)
Dept: RADIOLOGY | Facility: MEDICAL CENTER | Age: 56
End: 2021-06-23
Attending: INTERNAL MEDICINE
Payer: COMMERCIAL

## 2021-06-23 DIAGNOSIS — C50.412 MALIGNANT NEOPLASM OF UPPER-OUTER QUADRANT OF LEFT FEMALE BREAST, UNSPECIFIED ESTROGEN RECEPTOR STATUS (HCC): ICD-10-CM

## 2021-06-23 PROCEDURE — A9503 TC99M MEDRONATE: HCPCS

## 2021-07-14 ENCOUNTER — HOSPITAL ENCOUNTER (OUTPATIENT)
Dept: RADIOLOGY | Facility: MEDICAL CENTER | Age: 56
End: 2021-07-14
Attending: RADIOLOGY
Payer: COMMERCIAL

## 2021-07-14 DIAGNOSIS — C79.31 BRAIN METASTASES: ICD-10-CM

## 2021-07-14 PROCEDURE — 70553 MRI BRAIN STEM W/O & W/DYE: CPT

## 2021-07-14 PROCEDURE — 700117 HCHG RX CONTRAST REV CODE 255: Performed by: RADIOLOGY

## 2021-07-14 PROCEDURE — A9576 INJ PROHANCE MULTIPACK: HCPCS | Performed by: RADIOLOGY

## 2021-07-14 RX ADMIN — GADOTERIDOL 14 ML: 279.3 INJECTION, SOLUTION INTRAVENOUS at 18:22

## 2021-07-16 ENCOUNTER — HOSPITAL ENCOUNTER (OUTPATIENT)
Dept: RADIATION ONCOLOGY | Facility: MEDICAL CENTER | Age: 56
End: 2021-07-31
Attending: RADIOLOGY
Payer: COMMERCIAL

## 2021-07-16 VITALS
SYSTOLIC BLOOD PRESSURE: 145 MMHG | TEMPERATURE: 97.5 F | WEIGHT: 148.37 LBS | HEART RATE: 77 BPM | OXYGEN SATURATION: 99 % | HEIGHT: 65 IN | DIASTOLIC BLOOD PRESSURE: 83 MMHG | BODY MASS INDEX: 24.72 KG/M2

## 2021-07-16 DIAGNOSIS — C79.31 BRAIN METASTASES: ICD-10-CM

## 2021-07-16 PROCEDURE — 99214 OFFICE O/P EST MOD 30 MIN: CPT | Performed by: RADIOLOGY

## 2021-07-16 PROCEDURE — 99212 OFFICE O/P EST SF 10 MIN: CPT | Performed by: RADIOLOGY

## 2021-07-16 ASSESSMENT — PAIN SCALES - GENERAL: PAINLEVEL: NO PAIN

## 2021-07-16 ASSESSMENT — FIBROSIS 4 INDEX: FIB4 SCORE: 1.99

## 2021-07-16 NOTE — NON-PROVIDER
"Patient was seen today in clinic with Dr. Darnell for follow up.  Vitals signs and weight were obtained and pain assessment was completed.  Allergies and medications were reviewed with the patient.  Toxicities of treatment assessed.     Vitals/Pain:  Vitals:    07/16/21 0934   BP: 145/83   BP Location: Right arm   Patient Position: Sitting   BP Cuff Size: Adult   Pulse: 77   Temp: 36.4 °C (97.5 °F)   TempSrc: Temporal   SpO2: 99%   Weight: 67.3 kg (148 lb 5.9 oz)   Height: 1.651 m (5' 5\")   Pain Score: No pain        Allergies:   Patient has no known allergies.    Current Medications:  Current Outpatient Medications   Medication Sig Dispense Refill   • dexamethasone (DECADRON) 4 MG Tab Take 1 tablet by mouth every 12 hours. 2 tablet 0   • denosumab (XGEVA) 120 MG/1.7ML injection Inject 120 mg under the skin every 3 months.     • VERZENIO 100 MG Tab Take 100 mg by mouth every day.     • fulvestrant (FASLODEX) 250 MG/5ML Solution injection Inject 5 mL into the shoulder, thigh, or buttocks Q30 DAYS.     • atorvastatin (LIPITOR) 80 MG tablet Take 80 mg by mouth every day.     • CALCIUM PO Take 600 mg by mouth 2 times a day.     • cephALEXin (KEFLEX) 500 MG Cap Take 500 mg by mouth. (Patient not taking: Reported on 7/16/2021)     • dexamethasone (DECADRON) 2 MG tablet Take 1 tablet by mouth every 12 hours. (Patient not taking: Reported on 7/16/2021) 2 tablet 0   • docusate sodium 100 MG Cap Take 100 mg by mouth 2 Times a Day. (Patient not taking: Reported on 7/16/2021) 60 capsule    • levETIRAcetam (KEPPRA) 500 MG Tab Take 1 tablet by mouth 2 Times a Day. (Patient not taking: Reported on 3/15/2021) 10 tablet 0     No current facility-administered medications for this encounter.           Suzette Meza, Med Ass't    "

## 2021-07-16 NOTE — CT SIMULATION
PATIENT NAME Michelle Jerry Diana   PRIMARY PHYSICIAN Pcp Pt States None [id number]   REFERRING PHYSICIAN No ref. provider found 1965     Breast cancer metastasized to brain, left (HCC)  Staging form: BREAST AJCC V7  - Pathologic: Stage IV (T2, N0, M1) - Signed by Merlene Darnell M.D. on 2/3/2020  Laterality: Left  Method of detection of distant metastases: Radiographic  Biopsy of metastatic site performed: No  Source of metastatic specimen: Brain/CNS  Histologic grade (G): G2  Estrogen receptor status: Positive  Progesterone receptor status: Negative  HER2 status: Negative         Treatment Planning CT Simulation      Order Questions     Question Answer Comment    Is this for a new course of treatment? Yes     Is this an Addendum? No     Simulation Status Initial     Treatment Technique SRS     Treatment Pattern/Frequency Daily     Concurrent Chemotherapy No     CT Technique 3D     Slice Thickness 1mm     Scan Extent Brain     Treatment Device(s) SRS Mask     Patient Attire Gown     Patient Position Supine     Patient Orientation Head First     Treatment Machine No preference     Treatment Image Guidance CBCT     Image Guidance Match Bone     Treatment Planning Image Fusion CT/MR     Special Physics Consult Stereotactic     Other Orders Special Tx Procedure

## 2021-07-16 NOTE — PROGRESS NOTES
RADIATION ONCOLOGY FOLLOW-UP    DATE OF SERVICE: 7/16/2021    IDENTIFICATION:   A 55 y.o. 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  She had previously been treated to a left occipital lesion following resection and stereotactic radiotherapy with CyberKnife in 2017.Now status post stereotactic radiosurgery to multiple lesions complete 11/10/2020.    Currently on Verzenio and letrozole.Here to review the results of her recent MRI scan.     HISTORY OF PRESENT ILLNESS:   Since last seen she has been doing well she did notice some fullness in her head so she put herself back on dexamethasone the first weekend in July.  4 mg once a day its aching the head pressure less and helping with balance.  MRI scan done 7/14/2021 compared to April shows the right inferior cerebellum is slightly larger in size compared to the previous study.  Left superior nodular enhancing lesion in the cerebellum is also larger.  Those in the left medial occipital are stable and she essentially has mixed response throughout the brain but no new lesions.She is continuing on Verzenio and letrozole.  And otherwise has a very good quality of life.She is continuing to work.    CURRENT MEDICATIONS:  Current Outpatient Medications   Medication Sig Dispense Refill   • dexamethasone (DECADRON) 4 MG Tab Take 1 tablet by mouth every 12 hours. 2 tablet 0   • denosumab (XGEVA) 120 MG/1.7ML injection Inject 120 mg under the skin every 3 months.     • VERZENIO 100 MG Tab Take 100 mg by mouth every day.     • fulvestrant (FASLODEX) 250 MG/5ML Solution injection Inject 5 mL into the shoulder, thigh, or buttocks Q30 DAYS.     • atorvastatin (LIPITOR) 80 MG tablet Take 80 mg by mouth every day.     • CALCIUM PO Take 600 mg by mouth 2 times a day.     • cephALEXin  "(KEFLEX) 500 MG Cap Take 500 mg by mouth. (Patient not taking: Reported on 7/16/2021)     • dexamethasone (DECADRON) 2 MG tablet Take 1 tablet by mouth every 12 hours. (Patient not taking: Reported on 7/16/2021) 2 tablet 0   • docusate sodium 100 MG Cap Take 100 mg by mouth 2 Times a Day. (Patient not taking: Reported on 7/16/2021) 60 capsule    • levETIRAcetam (KEPPRA) 500 MG Tab Take 1 tablet by mouth 2 Times a Day. (Patient not taking: Reported on 3/15/2021) 10 tablet 0     No current facility-administered medications for this encounter.       ALLERGIES:  Patient has no known allergies.    FAMILY HISTORY:    Family History   Problem Relation Age of Onset   • Cancer Father         Prostate cancer/colon cancer   [unfilled]        SOCIAL HISTORY:     reports that she has never smoked. She has never used smokeless tobacco. She reports that she does not drink alcohol and does not use drugs.        REVIEW OF SYSTEMS: Is significant for Dizziness better on steroids.  Slightly unsteady gait also better on steroids.  The rest of the review of systems has been reviewed.    PHYSICAL EXAM:     ECOG PERFORMANCE STATUS:  0= Fully active, able to carry on all pre-disease performance without restriction.   Vitals:    07/16/21 0934   BP: 145/83   BP Location: Right arm   Patient Position: Sitting   BP Cuff Size: Adult   Pulse: 77   Temp: 36.4 °C (97.5 °F)   TempSrc: Temporal   SpO2: 99%   Weight: 67.3 kg (148 lb 5.9 oz)   Height: 1.651 m (5' 5\")   Pain Score: No pain        GENERAL: Well-appearing alert and oriented x3 in no apparent distress  HEENT:  Pupils are equal, round, and reactive to light.  Extraocular muscles   are intact. Sclerae nonicteric.  Conjunctivae pink.  Oral cavity, tongue   protrudes midline.   NECK:  No preauricular neck supraclavicular axillary adenopathy.  LUNGS:  Clear to ascultation   HEART:  Regular rate and rhythm.  No murmur appreciated  ABDOMEN:  Soft. No evidence of hepatosplenomegaly. "   EXTREMITIES:  Without Edema.  NEUROLOGIC:  Cranial nerves II through XII were intact. Normal stance and gait motor and sensory grossly within normal limits          IMPRESSION:    A 55 y.o. 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  She had previously been treated to a left occipital lesion following resection and stereotactic radiotherapy with CyberKnife in 2017.Now status post stereotactic radiosurgery to multiple lesions complete 11/10/2020.    Currently on Verzenio and letrozole.Here to review the results of her recent MRI scan. No new lesions on MRI scan but some slightly increased in size.    RECOMMENDATIONS:     I have given her a Decadron taper. We will repeat an MRI scan in 6 weeks or sooner as needed.  I think some of this could be radiation necrosis and since it is easily controlled on dexamethasone I think the best idea is just serial follow-ups and try to taper her off the steroids        Thank you for the opportunity to participate in her care.  If any questions or comments, please do not hesitate in calling.      Please note that this dictation was created using voice recognition software. I have made every reasonable attempt to correct obvious errors, but I expect that there are errors of grammar and possibly content that I did not discover before finalizing the note.        Addendum: I carefully reviewed the scan myself and actually found 2 new lesions in the left occipital region one 1 mm and  2 mm.  I have discussed this with her and we have decided to  treat these lesions.

## 2021-07-16 NOTE — ADDENDUM NOTE
Encounter addended by: Merlene Darnell M.D. on: 7/16/2021 2:55 PM   Actions taken: Clinical Note Signed

## 2021-07-20 ENCOUNTER — HOSPITAL ENCOUNTER (OUTPATIENT)
Dept: RADIATION ONCOLOGY | Facility: MEDICAL CENTER | Age: 56
End: 2021-07-20
Payer: COMMERCIAL

## 2021-07-20 PROCEDURE — 77470 SPECIAL RADIATION TREATMENT: CPT | Performed by: RADIOLOGY

## 2021-07-20 PROCEDURE — 77290 THER RAD SIMULAJ FIELD CPLX: CPT | Performed by: RADIOLOGY

## 2021-07-20 PROCEDURE — 77263 THER RADIOLOGY TX PLNG CPLX: CPT | Performed by: RADIOLOGY

## 2021-07-20 PROCEDURE — 77470 SPECIAL RADIATION TREATMENT: CPT | Mod: 26 | Performed by: RADIOLOGY

## 2021-07-20 PROCEDURE — 77290 THER RAD SIMULAJ FIELD CPLX: CPT | Mod: 26 | Performed by: RADIOLOGY

## 2021-07-20 PROCEDURE — 77334 RADIATION TREATMENT AID(S): CPT | Performed by: RADIOLOGY

## 2021-07-20 PROCEDURE — 77334 RADIATION TREATMENT AID(S): CPT | Mod: 26 | Performed by: RADIOLOGY

## 2021-07-22 PROCEDURE — 77370 RADIATION PHYSICS CONSULT: CPT | Performed by: RADIOLOGY

## 2021-07-26 PROCEDURE — 77295 3-D RADIOTHERAPY PLAN: CPT | Mod: 26 | Performed by: RADIOLOGY

## 2021-07-26 PROCEDURE — 77300 RADIATION THERAPY DOSE PLAN: CPT | Performed by: RADIOLOGY

## 2021-07-26 PROCEDURE — 77334 RADIATION TREATMENT AID(S): CPT | Performed by: RADIOLOGY

## 2021-07-26 PROCEDURE — 77334 RADIATION TREATMENT AID(S): CPT | Mod: 26 | Performed by: RADIOLOGY

## 2021-07-26 PROCEDURE — 77300 RADIATION THERAPY DOSE PLAN: CPT | Mod: 26 | Performed by: RADIOLOGY

## 2021-07-26 PROCEDURE — 77295 3-D RADIOTHERAPY PLAN: CPT | Performed by: RADIOLOGY

## 2021-07-27 ENCOUNTER — HOSPITAL ENCOUNTER (OUTPATIENT)
Dept: RADIATION ONCOLOGY | Facility: MEDICAL CENTER | Age: 56
End: 2021-07-27

## 2021-07-27 DIAGNOSIS — C79.31 BRAIN METASTASES: ICD-10-CM

## 2021-07-27 LAB
CHEMOTHERAPY INFUSION START DATE: NORMAL
CHEMOTHERAPY RECORDS: 22
CHEMOTHERAPY RECORDS: 2200
CHEMOTHERAPY RECORDS: NORMAL
CHEMOTHERAPY RX CANCER: NORMAL
DATE 1ST CHEMO CANCER: NORMAL
RAD ONC ARIA COURSE LAST TREATMENT DATE: NORMAL
RAD ONC ARIA COURSE TREATMENT ELAPSED DAYS: NORMAL
RAD ONC ARIA REFERENCE POINT DOSAGE GIVEN TO DATE: 22
RAD ONC ARIA REFERENCE POINT DOSAGE GIVEN TO DATE: 27.62
RAD ONC ARIA REFERENCE POINT ID: NORMAL
RAD ONC ARIA REFERENCE POINT ID: NORMAL
RAD ONC ARIA REFERENCE POINT SESSION DOSAGE GIVEN: 22
RAD ONC ARIA REFERENCE POINT SESSION DOSAGE GIVEN: 27.62

## 2021-07-27 PROCEDURE — 77336 RADIATION PHYSICS CONSULT: CPT | Performed by: RADIOLOGY

## 2021-07-27 PROCEDURE — 77280 THER RAD SIMULAJ FIELD SMPL: CPT | Mod: 26 | Performed by: RADIOLOGY

## 2021-07-27 PROCEDURE — 77372 SRS LINEAR BASED: CPT | Performed by: RADIOLOGY

## 2021-07-27 PROCEDURE — 77432 STEREOTACTIC RADIATION TRMT: CPT | Performed by: RADIOLOGY

## 2021-07-27 PROCEDURE — 77280 THER RAD SIMULAJ FIELD SMPL: CPT | Performed by: RADIOLOGY

## 2021-07-27 RX ORDER — DEXAMETHASONE 4 MG/1
4 TABLET ORAL EVERY 12 HOURS
Qty: 30 TABLET | Refills: 1 | Status: SHIPPED | OUTPATIENT
Start: 2021-07-27 | End: 2021-10-06

## 2021-07-27 NOTE — ON TREATMENT VISIT
ON TREATMENT  NOTE  RADIATION ONCOLOGY DEPARTMENT    Patient name:  Michelle Diana    Primary Physician:  Pcp Pt States None MRN: [id number]  CSN: [id number]   Referring physician:  No ref. provider found : 1965, 55 y.o.     ENCOUNTER DATE:  21    DIAGNOSIS:    Breast cancer metastasized to brain, left (HCC)  Staging form: BREAST AJCC V7  - Pathologic: Stage IV (T2, N0, M1) - Signed by Merlene Darnell M.D. on 2/3/2020  Laterality: Left  Method of detection of distant metastases: Radiographic  Biopsy of metastatic site performed: No  Source of metastatic specimen: Brain/CNS  Histologic grade (G): G2  Estrogen receptor status: Positive  Progesterone receptor status: Negative  HER2 status: Negative      TREATMENT SUMMARY:  [facility]ria Treatment Information        Some values may be hidden. Unless noted otherwise, only the newest values recorded on each date are displayed.         Aria Treatment Summary 21   Course First Treatment Date 2021   Course Last Treatment Date 2021   L_Occipit_SRS Plan from Course C5_SRS   Fraction 1 of 1   Elapsed Course Days 0 @ [id number]   Prescribed Fraction Dose 2,200 cGy   Prescribed Total Dose 2,200 cGy   L_Occipit_SRS CP Reference Point from Course C5_SRS   Elapsed Course Days 0 @ [id number]   Session Dose 2,762 cGy   Total Dose 2,762 cGy   L_Occipital_SRS Reference Point from Course C5_SRS   Elapsed Course Days 0 @ [id number]   Session Dose 2,200 cGy   Total Dose 2,200 cGy   6 Lesion SRT Plan from Course C2_SRSbrain   6 Lesion SRT Reference Point from Course C2_SRSbrain   6 Lesion SRT CP Reference Point from Course C2_SRSbrain   4 Lesion SRT Plan from Course C4_4_lesion_SRT   4 Les SRT CP Reference Point from Course C4_4_lesion_SRT   4 Lesion SRT Reference Point from Course C4_4_lesion_SRT   12-15 SRT Plan from Course C3_4 Lesion SRT   12-15 SRT Reference Point from Course C3_4 Lesion SRT   12-15 SRT CP Reference Point from Course C3_4 Lesion  SRT   01,02,03 SRS Plan from Course C1_brain   L Rodolfo SRT Plan from Course C1_brain   R Temp SRS Plan from Course C1_brain   01,02,03 SRS Reference Point from Course C1_brain   01,02,03 SRS CP Reference Point from Course C1_brain   L Rodolfo SRT Reference Point from Course C1_brain   L Rodolfo SRT CP Reference Point from Course C1_brain   R Temp SRS Reference Point from Course C1_brain   R Temp SRS CP Reference Point from Course C1_brain             SUBJECTIVE:  Tolerated treatment without difficulty        VITAL SIGNS:  KPS: 100, Fully active, able to carry on all pre-disease performed without restriction (ECOG equivalent 0)     Pain Assessment 7/16/2021 4/14/2021 3/15/2021   Pain Assessment - Denies Pain -   Pain Score 0 0 0   Some recent data might be hidden          PHYSICAL EXAM:  No change      Toxicity Assessment 4/14/2021 8/12/2020   Toxicity Assessment Brain Brain   Fatigue (lethargy, malaise, asthenia) None None   Radiation Dermatitis None None   Nausea None None   Mouth Dryness Normal -   Headache None None   External Auditory Canal Normal Normal   Inner Ear/Hearing Normal -   Middle Ear/Hearing Normal -   Dizziness/lightheadedness None None   Memory loss Normal Normal   Neuropathy - Motor Normal Normal   Seizure None None   Vertigo None None           IMPRESSION:  Cancer Staging  Breast cancer metastasized to brain, left (HCC)  Staging form: BREAST AJCC V7  - Pathologic: Stage IV (T2, N0, M1) - Signed by Merlene Darnell M.D. on 2/3/2020      PLAN:  No change in treatment plan. Gve a prescription for decadron taper. Hopefully we can get her off it with a slow tper.    Disposition:  Treatment plan reviewed. Questions answered. Continue therapy outlined.     Merlene Darnell M.D.    Orders Placed This Encounter   • Rad Onc Aria Session Summary   • dexamethasone (DECADRON) 4 MG Tab

## 2021-07-30 LAB
CHEMOTHERAPY INFUSION START DATE: NORMAL
CHEMOTHERAPY INFUSION START DATE: NORMAL
CHEMOTHERAPY INFUSION STOP DATE: NORMAL
CHEMOTHERAPY INFUSION STOP DATE: NORMAL
CHEMOTHERAPY RECORDS: 20
CHEMOTHERAPY RECORDS: 20
CHEMOTHERAPY RECORDS: 2000
CHEMOTHERAPY RECORDS: 2000
CHEMOTHERAPY RECORDS: 22
CHEMOTHERAPY RECORDS: 2200
CHEMOTHERAPY RECORDS: 2500
CHEMOTHERAPY RECORDS: 5
CHEMOTHERAPY RECORDS: NORMAL
CHEMOTHERAPY RX CANCER: NORMAL
CHEMOTHERAPY RX CANCER: NORMAL
DATE 1ST CHEMO CANCER: NORMAL
DATE 1ST CHEMO CANCER: NORMAL
RAD ONC ARIA COURSE LAST TREATMENT DATE: NORMAL
RAD ONC ARIA COURSE LAST TREATMENT DATE: NORMAL
RAD ONC ARIA COURSE TREATMENT ELAPSED DAYS: NORMAL
RAD ONC ARIA COURSE TREATMENT ELAPSED DAYS: NORMAL
RAD ONC ARIA REFERENCE POINT DOSAGE GIVEN TO DATE: 20
RAD ONC ARIA REFERENCE POINT DOSAGE GIVEN TO DATE: 20
RAD ONC ARIA REFERENCE POINT DOSAGE GIVEN TO DATE: 22
RAD ONC ARIA REFERENCE POINT DOSAGE GIVEN TO DATE: 24.03
RAD ONC ARIA REFERENCE POINT DOSAGE GIVEN TO DATE: 24.69
RAD ONC ARIA REFERENCE POINT DOSAGE GIVEN TO DATE: 25
RAD ONC ARIA REFERENCE POINT DOSAGE GIVEN TO DATE: 27.62
RAD ONC ARIA REFERENCE POINT DOSAGE GIVEN TO DATE: 28.73
RAD ONC ARIA REFERENCE POINT ID: NORMAL

## 2021-08-09 ENCOUNTER — HOSPITAL ENCOUNTER (OUTPATIENT)
Facility: MEDICAL CENTER | Age: 56
End: 2021-08-09
Attending: INTERNAL MEDICINE
Payer: COMMERCIAL

## 2021-08-09 LAB
FORWARD REASON: SPWHY: NORMAL
FORWARDED TO LAB: SPWHR: NORMAL
SPECIMEN SENT: SPWT1: NORMAL

## 2021-08-13 ENCOUNTER — HOSPITAL ENCOUNTER (OUTPATIENT)
Dept: RADIATION ONCOLOGY | Facility: MEDICAL CENTER | Age: 56
End: 2021-08-31
Attending: RADIOLOGY
Payer: COMMERCIAL

## 2021-08-13 NOTE — PROGRESS NOTES
Telephone Appointment Visit   As a means of avoiding spread of COVID-19, this visit is being conducted by telephone. This telephone visit was initiated by the patient and they verbally consented.    Time at start of call: 1:30    Reason for Call:  Trouble with Decadron taper    HPI:    A 55 y.o. female with metastatic estrogen positive breast cancer to the brain now with 5 new brain metastasis status post SRS complete 2/26/2022 a left mary lesion,  right and left parietal occipital lesions, right temporal lesion, and right occipital lesion.  She had previously been treated to a left occipital lesion following resection and stereotactic radiotherapy with CyberKnife in 2017.Now status post stereotactic radiosurgery to multiple lesions complete 11/10/2020.    Currently on Verzenio and letrozole. Status post most recent SRT complete 7/27/2021. Since last seen we have been trying to get her on a Decadron taper.  She was on 2 mg twice a day and then dropped down to 1 and then was unable to stop.  She still felt pressure with the 2 mg a day.     Labs / Images Reviewed:   None     Assessment and Plan:     Metastatic estrogen positive breast cancer to brain  Follow-up: I am going to see her in follow-up by telephone in 2 weeks.  What we have decided to do is to keep her on 4 mg in the morning 2 mg at night for 2 weeks and we will try to taper after that.    Time at end of call: 1:40  Total Time Spent: 5-10 minutes    Merlene Darnell M.D.

## 2021-08-27 NOTE — PROGRESS NOTES
Telephone Appointment Visit   As a means of avoiding spread of COVID-19, this visit is being conducted by telephone. This telephone visit was initiated by the patient and they verbally consented.    Time at start of call: 1:00    Reason for Call:  Checking on steroid taper    HPI:    Since last seen patient has been on 4 mg of dexamethasone in the morning and 2 mg in the afternoon.  Her symptoms are stable.  She was seen by Nilam at [facility] and she was noted to be cushingoid and had a small amount of weight gain.  Patient still feels pressure but really no headache.     Labs / Images Reviewed:   None     Assessment and Plan:     Metastatic breast cancer to the brain with low-grade pressure in the head  Follow-up: What we have decided to do is decrease the dexamethasone to 4 mg in the morning for 2 weeks then 2 mg a morning for 2 weeks.  I will be seeing her 928 with a repeat MRI scan.  I am happy to see her sooner.    Time at end of call: 1:15  Total Time Spent: 5-10 minutes    Merlene Darnell M.D.

## 2021-09-20 ENCOUNTER — HOSPITAL ENCOUNTER (OUTPATIENT)
Dept: RADIATION ONCOLOGY | Facility: MEDICAL CENTER | Age: 56
End: 2021-09-30
Attending: RADIOLOGY
Payer: COMMERCIAL

## 2021-09-20 NOTE — PROGRESS NOTES
Telephone Appointment Visit   As a means of avoiding spread of COVID-19, this visit is being conducted by telephone. This telephone visit was initiated by the patient and they verbally consented.    Time at start of call: 4:39    Reason for Call:  Medication Follow-up    HPI:    Patient has been continuing on her dexamethasone taper.  She has been on 2 mg in the morning every day since September 12.  She has been doing well with it but the last 2 days she is had a little bit more of a headache but this did respond to Tylenol.  She called because she was a little concerned about this issue.     Labs / Images Reviewed:   None     Assessment and Plan:     Metastatic estrogen positive breast cancer  Follow-up: Patient has a follow-up with me next week.  I have explained to her that I am in the speak with the neuroradiologist to see if in her situation an MRI Speck may help us in determining whether this is progressive disease or radiation necrosis.  I have also spoken with Dr. Granado she is worried that this tumor recurrence.  We will be in touch with her once we spoken with the neuroradiologist and we may change the MRI scan next week to an MRI SPECT.    Time at end of call: 4:49  Total Time Spent: 11-20 minutes    Merlene Darnell M.D.

## 2021-09-27 ENCOUNTER — HOSPITAL ENCOUNTER (OUTPATIENT)
Dept: RADIOLOGY | Facility: MEDICAL CENTER | Age: 56
End: 2021-09-27
Attending: RADIOLOGY
Payer: COMMERCIAL

## 2021-09-27 DIAGNOSIS — C79.31 BRAIN METASTASES: ICD-10-CM

## 2021-09-27 PROCEDURE — 700117 HCHG RX CONTRAST REV CODE 255: Performed by: RADIOLOGY

## 2021-09-27 PROCEDURE — A9576 INJ PROHANCE MULTIPACK: HCPCS | Performed by: RADIOLOGY

## 2021-09-27 PROCEDURE — 70553 MRI BRAIN STEM W/O & W/DYE: CPT

## 2021-09-27 RX ADMIN — GADOTERIDOL 10 ML: 279.3 INJECTION, SOLUTION INTRAVENOUS at 13:42

## 2021-09-28 ENCOUNTER — HOSPITAL ENCOUNTER (OUTPATIENT)
Facility: MEDICAL CENTER | Age: 56
End: 2021-09-28
Attending: INTERNAL MEDICINE
Payer: COMMERCIAL

## 2021-09-28 VITALS — HEART RATE: 103 BPM | TEMPERATURE: 96.9 F | OXYGEN SATURATION: 97 %

## 2021-09-28 DIAGNOSIS — C79.31 BRAIN METASTASES: ICD-10-CM

## 2021-09-28 LAB
FORWARD REASON: SPWHY: NORMAL
FORWARDED TO LAB: SPWHR: NORMAL
SPECIMEN SENT: SPWT1: NORMAL

## 2021-09-28 PROCEDURE — 99212 OFFICE O/P EST SF 10 MIN: CPT | Performed by: RADIOLOGY

## 2021-09-28 ASSESSMENT — PAIN SCALES - GENERAL: PAINLEVEL: 4=SLIGHT-MODERATE PAIN

## 2021-09-28 NOTE — CT SIMULATION
PATIENT NAME Michelle Jerry Diana   PRIMARY PHYSICIAN Pcp Pt States None [phone]   REFERRING PHYSICIAN No ref. provider found 1965     Breast cancer metastasized to brain, left (HCC)  Staging form: BREAST AJCC V7  - Pathologic: Stage IV (T2, N0, M1) - Signed by Merlene Darnell M.D. on 2/3/2020  Laterality: Left  Method of detection of distant metastases: Radiographic  Biopsy of metastatic site performed: No  Source of metastatic specimen: Brain/CNS  Histologic grade (G): G2  Estrogen receptor status: Positive  Progesterone receptor status: Negative  HER2 status: Negative         Treatment Planning CT Simulation      Order Questions     Question Answer    Is this for a new course of treatment? Yes    Is this an Addendum? No    Simulation Status Initial    Treatment Technique SRS    Treatment Pattern/Frequency Daily    Concurrent Chemotherapy No    CT Technique 3D    Slice Thickness 1mm    Scan Extent Brain    Treatment Device(s) SRS Mask    Patient Attire Gown    Patient Position Supine    Patient Orientation Head First    Treatment Machine No preference    Treatment Image Guidance CBCT    Image Guidance Match Bone    Treatment Planning Image Fusion CT/MR    Special Physics Consult Stereotactic    Other Orders Special Tx Procedure

## 2021-09-28 NOTE — NON-PROVIDER
Patient was seen today in clinic with Dr. Darnell for Follow up.  Vitals signs and weight were obtained and pain assessment was completed.  Allergies and medications were reviewed with the patient.  Toxicities of treatment assessed.     Vitals/Pain:  Vitals:    09/28/21 1340   Pulse: (!) 103   Temp: 36.1 °C (96.9 °F)   SpO2: 97%   Pain Score: 4=Slight-Moderate Pain        Allergies:   Patient has no known allergies.    Current Medications:  Current Outpatient Medications   Medication Sig Dispense Refill   • dexamethasone (DECADRON) 4 MG Tab Take 1 tablet by mouth every 12 hours. 30 tablet 1   • dexamethasone (DECADRON) 2 MG tablet Take 1 tablet by mouth every 12 hours. (Patient taking differently: Take 2 mg by mouth every 12 hours. 2mg once daily) 2 tablet 0   • docusate sodium 100 MG Cap Take 100 mg by mouth 2 Times a Day. (Patient not taking: Reported on 7/16/2021) 60 capsule    • denosumab (XGEVA) 120 MG/1.7ML injection Inject 120 mg under the skin every 3 months.     • VERZENIO 100 MG Tab Take 100 mg by mouth every day.     • fulvestrant (FASLODEX) 250 MG/5ML Solution injection Inject 5 mL into the shoulder, thigh, or buttocks Q30 DAYS.     • atorvastatin (LIPITOR) 80 MG tablet Take 80 mg by mouth every day.     • CALCIUM PO Take 600 mg by mouth 2 times a day.       No current facility-administered medications for this encounter.         PCP:  Pcp Pt States None        Jorge Hogue Ass't

## 2021-09-28 NOTE — PROGRESS NOTES
RADIATION ONCOLOGY FOLLOW-UP    DATE OF SERVICE: 9/28/2021    IDENTIFICATION:   A 55 y.o. female with metastatic estrogen positive breast cancer to the brain now with 5 new brain metastasis status post SRS complete 2/26/2022 a left mary lesion,  right and left parietal occipital lesions, right temporal lesion, and right occipital lesion.  She had previously been treated to a left occipital lesion following resection and stereotactic radiotherapy with CyberKnife in 2017.Now status post stereotactic radiosurgery to multiple lesions complete 11/10/2020.    Currently on Verzenio and letrozole. Status post most recent SRT complete 7/27/2021. Here to review most recent MRI results.    HISTORY OF PRESENT ILLNESS:   Since last seen we have had multiple phone conversations about trying to taper off the steroids which she has had trouble doing.  Over the last several months she has gained about 30 pounds being on the steroids she is really having a difficult time because of headaches.  She otherwise is fully functional and continuing on her Verzenio and Faslodex. MRI scan of the brain was done 9/27/2021 showing multiple lesions there was at least one new metastasis and either tumor progression versus radiation necrosis in multiple of the other lesions.  She is actually doing fairly well continue to work but she is planning to retire October 8.    CURRENT MEDICATIONS:  Current Outpatient Medications   Medication Sig Dispense Refill   • dexamethasone (DECADRON) 4 MG Tab Take 1 tablet by mouth every 12 hours. 30 tablet 1   • dexamethasone (DECADRON) 2 MG tablet Take 1 tablet by mouth every 12 hours. (Patient taking differently: Take 2 mg by mouth every 12 hours. 2mg once daily) 2 tablet 0   • docusate sodium 100 MG Cap Take 100 mg by mouth 2 Times a Day. (Patient not taking: Reported on 7/16/2021) 60 capsule    • denosumab (XGEVA) 120 MG/1.7ML injection Inject 120 mg under the skin every 3 months.     • VERZENIO 100 MG Tab Take  100 mg by mouth every day.     • fulvestrant (FASLODEX) 250 MG/5ML Solution injection Inject 5 mL into the shoulder, thigh, or buttocks Q30 DAYS.     • atorvastatin (LIPITOR) 80 MG tablet Take 80 mg by mouth every day.     • CALCIUM PO Take 600 mg by mouth 2 times a day.       No current facility-administered medications for this encounter.       ALLERGIES:  Patient has no known allergies.    FAMILY HISTORY:    Family History   Problem Relation Age of Onset   • Cancer Father         Prostate cancer/colon cancer   [unfilled]        SOCIAL HISTORY:     reports that she has never smoked. She has never used smokeless tobacco. She reports that she does not drink alcohol and does not use drugs.        REVIEW OF SYSTEMS: Is significant for 30 pound weight gain in the last few months because of steroids, blurred vision headaches that she describes as from swelling in her brain.  The rest of the review of systems has been reviewed.    PHYSICAL EXAM:     ECOG PERFORMANCE STATUS:  0= Fully active, able to carry on all pre-disease performance without restriction.   Vitals:    09/28/21 1340   Pulse: (!) 103   Temp: 36.1 °C (96.9 °F)   SpO2: 97%   Pain Score: 4=Slight-Moderate Pain        GENERAL: Well-appearing alert and oriented x3 in no apparent distress but cushingoid  HEENT:  Pupils are equal, round, and reactive to light.  Extraocular muscles   are intact. Sclerae nonicteric.  Conjunctivae pink.  Oral cavity, tongue   protrudes midline.   NECK:  No preauricular neck supraclavicular axillary adenopathy.  LUNGS:  Clear to ascultation   HEART:  Regular rate and rhythm.  No murmur appreciated  ABDOMEN:  Soft. No evidence of hepatosplenomegaly.   EXTREMITIES:  Without Edema.  NEUROLOGIC:  Cranial nerves II through XII were intact. Normal stance and gait motor and sensory grossly within normal limits          IMPRESSION:    A 55 y.o. female with metastatic estrogen positive breast cancer to the brain now with 5 new brain  metastasis status post SRS complete 2/26/2022 a left mary lesion,  right and left parietal occipital lesions, right temporal lesion, and right occipital lesion.  She had previously been treated to a left occipital lesion following resection and stereotactic radiotherapy with CyberKnife in 2017.Now status post stereotactic radiosurgery to multiple lesions complete 11/10/2020.    Currently on Verzenio and letrozole. Status post most recent SRT complete 7/27/2021. Most recent MRI scan shows multiple areas that have been irradiated are larger consistent with either radiation necrosis or tumor progression.  There are also four additional lesions that we see after fusing the MRI scan with the previous treatment planning CAT scans.    RECOMMENDATIONS:     I explained to the patient that I presented to her neurosurgery tumor board on Monday.  No one is quite sure what to do.  She does have quite a bit of swelling in the right cerebellar region.  I did spoken with Dr. Snowden and we talked about resecting that lesion would give us an idea if that was necrosis or regrowth of tumor and it would also prevent hydrocephalus.  I also discussed treating with SRS those for lesions just to get us through the surgery and make decisions on what we find at surgery.I explained her that were kind about the end of the rope here.  If we see any further progression of disease me may want to consider whole brain radiation therapy.  Also we discussed Optune which I am not sure she is interested in and I am not even sure she is a candidate for it.At this time organ to proceed forward with SRS to the 4 lesions and resection of the right cerebellar mass to alleviate the brain swelling and prevent hydrocephalus.We will then make further decisions based on the results of the surgery.      Thank you for the opportunity to participate in her care.  If any questions or comments, please do not hesitate in calling.      Please note that this dictation  was created using voice recognition software. I have made every reasonable attempt to correct obvious errors, but I expect that there are errors of grammar and possibly content that I did not discover before finalizing the note.

## 2021-09-29 ENCOUNTER — HOSPITAL ENCOUNTER (OUTPATIENT)
Dept: RADIATION ONCOLOGY | Facility: MEDICAL CENTER | Age: 56
End: 2021-09-29

## 2021-09-29 ENCOUNTER — HOSPITAL ENCOUNTER (OUTPATIENT)
Dept: RADIOLOGY | Facility: MEDICAL CENTER | Age: 56
End: 2021-09-29

## 2021-09-29 PROCEDURE — 77334 RADIATION TREATMENT AID(S): CPT | Performed by: RADIOLOGY

## 2021-09-29 PROCEDURE — 77290 THER RAD SIMULAJ FIELD CPLX: CPT | Performed by: RADIOLOGY

## 2021-09-29 PROCEDURE — 77334 RADIATION TREATMENT AID(S): CPT | Mod: 26 | Performed by: RADIOLOGY

## 2021-09-29 PROCEDURE — 77290 THER RAD SIMULAJ FIELD CPLX: CPT | Mod: 26 | Performed by: RADIOLOGY

## 2021-09-29 PROCEDURE — 77470 SPECIAL RADIATION TREATMENT: CPT | Mod: 26 | Performed by: RADIOLOGY

## 2021-09-29 PROCEDURE — 77263 THER RADIOLOGY TX PLNG CPLX: CPT | Performed by: RADIOLOGY

## 2021-09-29 PROCEDURE — 77470 SPECIAL RADIATION TREATMENT: CPT | Performed by: RADIOLOGY

## 2021-10-01 ENCOUNTER — HOSPITAL ENCOUNTER (OUTPATIENT)
Dept: RADIATION ONCOLOGY | Facility: MEDICAL CENTER | Age: 56
End: 2021-10-31
Attending: RADIOLOGY
Payer: COMMERCIAL

## 2021-10-01 ENCOUNTER — HOSPITAL ENCOUNTER (OUTPATIENT)
Facility: MEDICAL CENTER | Age: 56
End: 2021-10-01
Attending: INTERNAL MEDICINE | Admitting: INTERNAL MEDICINE
Payer: COMMERCIAL

## 2021-10-01 PROCEDURE — 77370 RADIATION PHYSICS CONSULT: CPT | Performed by: RADIOLOGY

## 2021-10-04 PROCEDURE — 77300 RADIATION THERAPY DOSE PLAN: CPT | Performed by: RADIOLOGY

## 2021-10-04 PROCEDURE — 77300 RADIATION THERAPY DOSE PLAN: CPT | Mod: 26 | Performed by: RADIOLOGY

## 2021-10-04 PROCEDURE — 77334 RADIATION TREATMENT AID(S): CPT | Mod: 26 | Performed by: RADIOLOGY

## 2021-10-04 PROCEDURE — 77295 3-D RADIOTHERAPY PLAN: CPT | Mod: 26 | Performed by: RADIOLOGY

## 2021-10-04 PROCEDURE — 77295 3-D RADIOTHERAPY PLAN: CPT | Performed by: RADIOLOGY

## 2021-10-04 PROCEDURE — 77334 RADIATION TREATMENT AID(S): CPT | Performed by: RADIOLOGY

## 2021-10-06 ENCOUNTER — HOSPITAL ENCOUNTER (OUTPATIENT)
Dept: RADIATION ONCOLOGY | Facility: MEDICAL CENTER | Age: 56
End: 2021-10-06
Payer: COMMERCIAL

## 2021-10-06 ENCOUNTER — APPOINTMENT (OUTPATIENT)
Dept: ADMISSIONS | Facility: MEDICAL CENTER | Age: 56
End: 2021-10-06
Payer: COMMERCIAL

## 2021-10-06 ENCOUNTER — HOSPITAL ENCOUNTER (OUTPATIENT)
Dept: RADIOLOGY | Facility: MEDICAL CENTER | Age: 56
End: 2021-10-06
Attending: INTERNAL MEDICINE
Payer: COMMERCIAL

## 2021-10-06 VITALS
SYSTOLIC BLOOD PRESSURE: 148 MMHG | TEMPERATURE: 96.7 F | DIASTOLIC BLOOD PRESSURE: 91 MMHG | HEART RATE: 77 BPM | OXYGEN SATURATION: 97 %

## 2021-10-06 DIAGNOSIS — C79.31 BRAIN METASTASES: ICD-10-CM

## 2021-10-06 LAB
CHEMOTHERAPY INFUSION START DATE: NORMAL
CHEMOTHERAPY RECORDS: 20
CHEMOTHERAPY RECORDS: 2000
CHEMOTHERAPY RECORDS: NORMAL
CHEMOTHERAPY RX CANCER: NORMAL
DATE 1ST CHEMO CANCER: NORMAL
RAD ONC ARIA COURSE LAST TREATMENT DATE: NORMAL
RAD ONC ARIA COURSE TREATMENT ELAPSED DAYS: NORMAL
RAD ONC ARIA REFERENCE POINT DOSAGE GIVEN TO DATE: 20
RAD ONC ARIA REFERENCE POINT DOSAGE GIVEN TO DATE: 24.37
RAD ONC ARIA REFERENCE POINT ID: NORMAL
RAD ONC ARIA REFERENCE POINT ID: NORMAL
RAD ONC ARIA REFERENCE POINT SESSION DOSAGE GIVEN: 20
RAD ONC ARIA REFERENCE POINT SESSION DOSAGE GIVEN: 24.37

## 2021-10-06 PROCEDURE — 77372 SRS LINEAR BASED: CPT | Performed by: RADIOLOGY

## 2021-10-06 PROCEDURE — 77336 RADIATION PHYSICS CONSULT: CPT | Performed by: RADIOLOGY

## 2021-10-06 PROCEDURE — 77432 STEREOTACTIC RADIATION TRMT: CPT | Performed by: RADIOLOGY

## 2021-10-06 PROCEDURE — 77280 THER RAD SIMULAJ FIELD SMPL: CPT | Mod: 26 | Performed by: RADIOLOGY

## 2021-10-06 PROCEDURE — 77280 THER RAD SIMULAJ FIELD SMPL: CPT | Performed by: RADIOLOGY

## 2021-10-06 RX ORDER — DEXAMETHASONE 4 MG/1
4 TABLET ORAL 3 TIMES DAILY
Qty: 90 TABLET | Refills: 2 | Status: SHIPPED | OUTPATIENT
Start: 2021-10-06 | End: 2021-12-02

## 2021-10-06 ASSESSMENT — PAIN SCALES - GENERAL: PAINLEVEL: NO PAIN

## 2021-10-06 NOTE — ON TREATMENT VISIT
ON TREATMENT  NOTE  RADIATION ONCOLOGY DEPARTMENT    Patient name:  Michelle Diana    Primary Physician:  Pcp Pt States None MRN: [id number]  [facility]: [id number]   Referring physician:  Mi Granado M.D. : 1965, 55 y.o.     ENCOUNTER DATE:  10/06/21    DIAGNOSIS:    Breast cancer metastasized to brain, left (HCC)  Staging form: BREAST AJCC V7  - Pathologic: Stage IV (T2, N0, M1) - Signed by Merlene Darnell M.D. on 2/3/2020  Laterality: Left  Method of detection of distant metastases: Radiographic  Biopsy of metastatic site performed: No  Source of metastatic specimen: Brain/CNS  Histologic grade (G): G2  Estrogen receptor status: Positive  Progesterone receptor status: Negative  HER2 status: Negative      TREATMENT SUMMARY:  Aria Treatment Information        Some values may be hidden. Unless noted otherwise, only the newest values recorded on each date are displayed.         Aria Treatment Summary 10/6/21   Course First Treatment Date 10/06/2021   Course Last Treatment Date 10/06/2021   4 Lesion SRS Plan from Course C6 4 Lesion SRS   Fraction 1 of 1   Elapsed Course Days 0 @ [id number]   Prescribed Fraction Dose 2,000 cGy   Prescribed Total Dose 2,000 cGy   4 Lesion SRS Reference Point from Course C6 4 Lesion SRS   Elapsed Course Days 0 @ [id number]   Session Dose 2,000 cGy   Total Dose 2,000 cGy   4 Lesion SRS CP Reference Point from Course C6 4 Lesion SRS   Elapsed Course Days 0 @ [id number]   Session Dose 2,437 cGy   Total Dose 2,437 cGy   L_Occipit_SRS Plan from Course C5_SRS   L_Occipit_SRS CP Reference Point from Course C5_SRS   L_Occipital_SRS Reference Point from Course C5_SRS   4 Lesion SRT Plan from Course C4_4_lesion_SRT   4 Les SRT CP Reference Point from Course C4_4_lesion_SRT   4 Lesion SRT Reference Point from Course C4_4_lesion_SRT   12-15 SRT Plan from Course C3_4 Lesion SRT   12-15 SRT Reference Point from Course C3_4 Lesion SRT   12-15 SRT CP Reference Point from Course  C3_4 Lesion SRT   6 Lesion SRT Plan from Course C2_SRSbrain   6 Lesion SRT Reference Point from Course C2_SRSbrain   6 Lesion SRT CP Reference Point from Course C2_SRSbrain   01,02,03 SRS Plan from Course C1_brain   L Rodolfo SRT Plan from Course C1_brain   R Temp SRS Plan from Course C1_brain   01,02,03 SRS Reference Point from Course C1_brain   01,02,03 SRS CP Reference Point from Course C1_brain   L Rodolfo SRT Reference Point from Course C1_brain   L Rodolfo SRT CP Reference Point from Course C1_brain   R Temp SRS Reference Point from Course C1_brain   R Temp SRS CP Reference Point from Course C1_brain             SUBJECTIVE:  Tolerated SRS without event.  Here also discussed with Dr. Snowden where to go from here.        VITAL SIGNS:  KPS: 100, Fully active, able to carry on all pre-disease performed without restriction (ECOG equivalent 0)  Encounter Vitals  Temperature: 35.9 °C (96.7 °F)  Temp src: Temporal  Blood Pressure: 148/91  Pulse: 77  Pulse Oximetry: 97 %  Pain Score: No pain  Pain Assessment 10/6/2021 9/28/2021 7/16/2021 4/14/2021   Pain Assessment - - - Denies Pain   Pain Score 0 4 0 0   Pain Loc - Head - -   Some recent data might be hidden          PHYSICAL EXAM:  Cushingoid otherwise no change      Toxicity Assessment 10/6/2021 4/14/2021 8/12/2020   Toxicity Assessment Brain Brain Brain   Fatigue (lethargy, malaise, asthenia) None None None   Radiation Dermatitis None None None   Nausea None None None   Mouth Dryness Normal Normal -   Headache None None None   External Auditory Canal Normal Normal Normal   Inner Ear/Hearing Normal Normal -   Middle Ear/Hearing Normal Normal -   Dizziness/lightheadedness None None None   Memory loss Normal Normal Normal   Neuropathy - Motor Normal Normal Normal   Seizure None None None   Vertigo None None None           IMPRESSION:  Cancer Staging  Breast cancer metastasized to brain, left (HCC)  Staging form: BREAST AJCC V7  - Pathologic: Stage IV (T2, N0, M1) - Signed by  Merlene Darnell M.D. on 2/3/2020      PLAN:  No change in treatment plan Patient was seen with Dr. Snowden.She understands that we need to continue the staging work-up.  She had a PET CT scan that shows aggression of disease in the bones the internal mammary node endobronchial area subcarinal region and liver.  Dr. Granado has a liver biopsy ordered as well.  We talked about resecting the right cerebellar metastasis versus necrosis.  She wants to think about this.  She understands it probably she will pass away from the disease in the brain.MRI scan ordered for 2 months and refill dexamethasone.  She knows that if she becomes more symptomatic to come in sooner.    Disposition:  Treatment plan reviewed. Questions answered. Continue therapy outlined.     Merlene Darnell M.D.    Orders Placed This Encounter   • MR-BRAIN-WITH & W/O   • dexamethasone (DECADRON) 4 MG Tab

## 2021-10-08 NOTE — OP REPORT
DATE OF SERVICE:  10/06/2021     PREOPERATIVE DIAGNOSIS:  Metastatic breast cancer.     POSTOPERATIVE DIAGNOSIS:  Metastatic breast cancer.     PROCEDURE:  Stereotactic radiosurgery to 4 metastatic lesions.     SURGEON:  German Snowden MD     RADIATION ONCOLOGIST:  Merlene Darnell MD     RADIATION PHYSICIST:  Shan Caceres.     COMPLICATIONS:  None.     DESCRIPTION OF PROCEDURE:  I was present for approval of the final plan and   for the first and only fraction.  The patient was treated with a total of 20   Gy with the 99.5% target volume.  She was brought to the radiation suite and   identified in the usual fashion, registered with excellent accuracy and   performing the procedure as listed above.  There were no complications.  The   patient tolerated the procedure well.        ______________________________  German Snowden MD    CPD/TRISH    DD:  10/07/2021 18:22  DT:  10/07/2021 18:34    Job#:  [id number]

## 2021-10-11 LAB
CHEMOTHERAPY INFUSION START DATE: NORMAL
CHEMOTHERAPY INFUSION STOP DATE: NORMAL
CHEMOTHERAPY RECORDS: 20
CHEMOTHERAPY RECORDS: 2000
CHEMOTHERAPY RECORDS: NORMAL
CHEMOTHERAPY RX CANCER: NORMAL
DATE 1ST CHEMO CANCER: NORMAL
RAD ONC ARIA COURSE LAST TREATMENT DATE: NORMAL
RAD ONC ARIA COURSE TREATMENT ELAPSED DAYS: NORMAL
RAD ONC ARIA REFERENCE POINT DOSAGE GIVEN TO DATE: 20
RAD ONC ARIA REFERENCE POINT DOSAGE GIVEN TO DATE: 24.37
RAD ONC ARIA REFERENCE POINT ID: NORMAL
RAD ONC ARIA REFERENCE POINT ID: NORMAL

## 2021-10-19 ENCOUNTER — APPOINTMENT (OUTPATIENT)
Dept: RADIOLOGY | Facility: MEDICAL CENTER | Age: 56
End: 2021-10-19
Attending: INTERNAL MEDICINE
Payer: COMMERCIAL

## 2021-11-16 ENCOUNTER — HOSPITAL ENCOUNTER (OUTPATIENT)
Facility: MEDICAL CENTER | Age: 56
End: 2021-11-16
Attending: NURSE PRACTITIONER
Payer: COMMERCIAL

## 2021-11-16 PROCEDURE — 80074 ACUTE HEPATITIS PANEL: CPT

## 2021-11-17 LAB
HAV IGM SERPL QL IA: NORMAL
HBV CORE IGM SER QL: NORMAL
HBV SURFACE AG SER QL: NORMAL
HCV AB SER QL: NORMAL

## 2021-12-02 ENCOUNTER — HOSPITAL ENCOUNTER (EMERGENCY)
Facility: MEDICAL CENTER | Age: 56
End: 2021-12-02
Attending: EMERGENCY MEDICINE
Payer: COMMERCIAL

## 2021-12-02 VITALS
RESPIRATION RATE: 16 BRPM | SYSTOLIC BLOOD PRESSURE: 133 MMHG | WEIGHT: 173.94 LBS | OXYGEN SATURATION: 95 % | BODY MASS INDEX: 28.95 KG/M2 | TEMPERATURE: 97.2 F | HEART RATE: 92 BPM | DIASTOLIC BLOOD PRESSURE: 87 MMHG

## 2021-12-02 DIAGNOSIS — I82.4Y2 ACUTE DEEP VEIN THROMBOSIS (DVT) OF PROXIMAL VEIN OF LEFT LOWER EXTREMITY (HCC): ICD-10-CM

## 2021-12-02 PROCEDURE — 700102 HCHG RX REV CODE 250 W/ 637 OVERRIDE(OP): Performed by: EMERGENCY MEDICINE

## 2021-12-02 PROCEDURE — 99284 EMERGENCY DEPT VISIT MOD MDM: CPT

## 2021-12-02 PROCEDURE — A9270 NON-COVERED ITEM OR SERVICE: HCPCS | Performed by: EMERGENCY MEDICINE

## 2021-12-02 RX ORDER — CAPECITABINE 500 MG/1
1500 TABLET, FILM COATED ORAL 2 TIMES DAILY
Status: ON HOLD | COMMUNITY
End: 2021-12-24

## 2021-12-02 RX ORDER — CAPECITABINE 150 MG/1
300 TABLET, FILM COATED ORAL 2 TIMES DAILY
Status: ON HOLD | COMMUNITY
End: 2021-12-24

## 2021-12-02 RX ORDER — DEXAMETHASONE 4 MG/1
4 TABLET ORAL 2 TIMES DAILY
COMMUNITY

## 2021-12-02 RX ORDER — FUROSEMIDE 20 MG/1
20 TABLET ORAL DAILY
Status: SHIPPED | COMMUNITY
Start: 2021-12-01 | End: 2021-12-22

## 2021-12-02 RX ADMIN — APIXABAN 10 MG: 5 TABLET, FILM COATED ORAL at 21:17

## 2021-12-02 ASSESSMENT — FIBROSIS 4 INDEX: FIB4 SCORE: 2.03

## 2021-12-03 NOTE — ED NOTES
Med Rec completed per patient and med bottles at bedside (Returned)  Allergies reviewed  No ORAL antibiotics in last 30 days

## 2021-12-03 NOTE — ED NOTES
DC instructions reviewed with pt. Pt verbalized understanding. Pt ambulated to [facility] with steady gait.

## 2021-12-03 NOTE — ED TRIAGE NOTES
Chief Complaint   Patient presents with   • Sent by MD   • Leg Swelling       57 yo female to triage for above complaint. Patient reports she was sent by PCP after ultrasound confirmed DVT in left leg. Patient reports LLE swelling x2weeks.    Patient hx of breast cancer, currently receiving radiation and oral chemo.    Pt is alert and oriented, speaking in full sentences, follows commands and responds appropriately to questions.     Patient placed in family room and educated on triage process. Asked to inform RN of any changes.    /97   Pulse 95   Temp 35.8 °C (96.5 °F) (Temporal)   Resp 16   Wt 78.9 kg (173 lb 15.1 oz)   SpO2 99%   BMI 28.95 kg/m²

## 2021-12-03 NOTE — ED PROVIDER NOTES
ED Provider Note    CHIEF COMPLAINT  Chief Complaint   Patient presents with   • Sent by MD   • Leg Swelling       HPI  Michelle Diana is a 56 y.o. female who presents with 2 weeks of left lower extremity swelling.  Patient was found to have a DVT in her left lower extremity by ultrasound this afternoon.  Patient is currently being treated for breast cancer by cancer care specialists.  Patient reports longstanding chronic malaise since starting chemotherapy but denies any weakness or numbness, she denies any new decrease in exertional tolerance or dyspnea on exertion any SOB or chest pain.    REVIEW OF SYSTEMS  ROS  See HPI for further details. All other systems are negative.     PAST MEDICAL HISTORY   has a past medical history of Cancer (HCC), Chemotherapy adverse reaction, Heart burn, High cholesterol, and History of radiation therapy.    SOCIAL HISTORY  Social History     Tobacco Use   • Smoking status: Never Smoker   • Smokeless tobacco: Never Used   Vaping Use   • Vaping Use: Never used   Substance and Sexual Activity   • Alcohol use: No   • Drug use: No   • Sexual activity: Not on file       SURGICAL HISTORY   has a past surgical history that includes lumpectomy (Left); lymph node excision (Left, 6/26/2018); other (2017); other (2016); and craniotomy stealth (Right, 3/3/2021).    CURRENT MEDICATIONS  Home Medications    **Home medications have not yet been reviewed for this encounter**         ALLERGIES  No Known Allergies    PHYSICAL EXAM  Vitals:    12/02/21 1938   BP: 133/87   Pulse: 92   Resp: 16   Temp: 36.2 °C (97.2 °F)   SpO2: 95%       Physical Exam  Constitutional:       Appearance: She is well-developed.   HENT:      Head: Normocephalic and atraumatic.   Eyes:      Conjunctiva/sclera: Conjunctivae normal.   Cardiovascular:      Rate and Rhythm: Normal rate and regular rhythm.   Pulmonary:      Effort: Pulmonary effort is normal. No respiratory distress.      Breath sounds: Normal breath  sounds. No stridor.   Musculoskeletal:      Cervical back: Normal range of motion and neck supple.      Comments: Left lower extremity with 1+ pitting edema, mildly enlarged compared to right.  Distal pulses are 2+.   Skin:     General: Skin is warm.   Neurological:      Mental Status: She is alert and oriented to person, place, and time.   Psychiatric:         Behavior: Behavior normal.           COURSE & MEDICAL DECISION MAKING  Pertinent Labs & Imaging studies reviewed. (See chart for details)    I requested records from [facility] to confirm that patient is in fact have a positive ultrasound. Ultrasound results do reveal DVT in her left lower extremity. DVT is in the distal femoral vein to posterior tibialis. Patient without any associated symptoms to suggest PE. I confirm with pharmacy and with [facility] that Eliquis would be appropriate for patient on her current chemo regimen. They're comfortable with this. Patient denies any hematuria, melena or hematochezia. Patient will be started on Eliquis. Patient will be discharged home, follow-up with primary care and oncology    The patient will return for worsening symptoms and is stable at the time of discharge. The patient verbalizes understanding and will comply.    FINAL IMPRESSION    1. Acute deep vein thrombosis (DVT) of proximal vein of left lower extremity (HCC)               Electronically signed by: Papo Corona M.D., 12/2/2021 7:51 PM

## 2021-12-06 ENCOUNTER — TELEPHONE (OUTPATIENT)
Dept: VASCULAR LAB | Facility: MEDICAL CENTER | Age: 56
End: 2021-12-06

## 2021-12-06 NOTE — TELEPHONE ENCOUNTER
[facility]    Received anticoag referral for Eliquis due to DVT from Dr. Corona on 12/2/21    Scheduled pt for initial visit on 12/10    Insurance: PEBP  PCP: None  Locations to be seen: [address]    [facility] at [phone], fax [phone]    Kareem Fernandez, PharmD

## 2021-12-10 ENCOUNTER — APPOINTMENT (OUTPATIENT)
Dept: RADIOLOGY | Facility: MEDICAL CENTER | Age: 56
End: 2021-12-10
Attending: RADIOLOGY
Payer: COMMERCIAL

## 2021-12-13 ENCOUNTER — TELEPHONE (OUTPATIENT)
Dept: VASCULAR LAB | Facility: MEDICAL CENTER | Age: 56
End: 2021-12-13

## 2021-12-13 NOTE — TELEPHONE ENCOUNTER
[facility]     Received anticoag referral for Eliquis due to DVT from Dr. Corona on 12/2/21     Pt no showed initial visit on 12/10  Pt is driving from [place]  Scheduled NP for 12/15     Insurance: PEBP  PCP: None  Locations to be seen: [facility] at [phone], fax [phone]     Beulah Gore, LitzyD

## 2021-12-15 ENCOUNTER — DOCUMENTATION (OUTPATIENT)
Dept: VASCULAR LAB | Facility: MEDICAL CENTER | Age: 56
End: 2021-12-15

## 2021-12-15 ENCOUNTER — HOSPITAL ENCOUNTER (OUTPATIENT)
Dept: RADIOLOGY | Facility: MEDICAL CENTER | Age: 56
End: 2021-12-15
Attending: RADIOLOGY
Payer: COMMERCIAL

## 2021-12-15 ENCOUNTER — SUPERVISING PHYSICIAN REVIEW (OUTPATIENT)
Dept: VASCULAR LAB | Facility: MEDICAL CENTER | Age: 56
End: 2021-12-15

## 2021-12-15 ENCOUNTER — ANTICOAGULATION VISIT (OUTPATIENT)
Dept: VASCULAR LAB | Facility: MEDICAL CENTER | Age: 56
End: 2021-12-15
Attending: INTERNAL MEDICINE
Payer: COMMERCIAL

## 2021-12-15 DIAGNOSIS — C79.31 BRAIN METASTASES: ICD-10-CM

## 2021-12-15 DIAGNOSIS — I82.4Y9 DEEP VEIN THROMBOSIS (DVT) OF PROXIMAL LOWER EXTREMITY, UNSPECIFIED CHRONICITY, UNSPECIFIED LATERALITY (HCC): ICD-10-CM

## 2021-12-15 PROBLEM — I82.409 DEEP VEIN THROMBOSIS (HCC): Status: ACTIVE | Noted: 2021-12-15

## 2021-12-15 PROCEDURE — A9576 INJ PROHANCE MULTIPACK: HCPCS | Performed by: RADIOLOGY

## 2021-12-15 PROCEDURE — 99213 OFFICE O/P EST LOW 20 MIN: CPT

## 2021-12-15 PROCEDURE — 70553 MRI BRAIN STEM W/O & W/DYE: CPT

## 2021-12-15 PROCEDURE — 700117 HCHG RX CONTRAST REV CODE 255: Performed by: RADIOLOGY

## 2021-12-15 RX ADMIN — GADOTERIDOL 15 ML: 279.3 INJECTION, SOLUTION INTRAVENOUS at 18:10

## 2021-12-15 NOTE — PROGRESS NOTES
NEW DOAC   .  Anticoagulation Summary  As of 12/15/2021    INR goal:     TTR:  --   INR used for dosing:  No new INR was available at the time of this encounter.   Warfarin maintenance plan:  No maintenance plan   Next INR check:     Target end date:  Indefinite    Indications    Deep vein thrombosis (HCC) [I82.409]             Anticoagulation Episode Summary     INR check location:      Preferred lab:      Send INR reminders to:      Comments:          Anticoagulation Patient Findings      PCP: Pcp Pt States None  Cardiologist: Dr. Diaz at [facility]  Dx: LLE DVT  CHADSVASC = N/a  HAS-BLED = 0  Target End Date = Indefinite (Given malignancy related w/ metastases)    Pt Hx: Pt presented to ED on 12/2/21 d/t 2 weeks of LLE swelling. An US was conducted which was positive for DVT in the distal femoral vein to posterior tibialis. Of note, pt currently being treated for breast cancer by [facility]. Pt was subsequently started on Eliquis after consultation w/ pharmacy team and [facility]. No prior hx of VTE.    Labs:  Lab Results   Component Value Date/Time    WBC 2.6 (L) 04/14/2021 04:08 PM    RBC 2.68 (L) 04/14/2021 04:08 PM    HEMOGLOBIN 10.3 (L) 04/14/2021 04:08 PM    HEMATOCRIT 30.8 (L) 04/14/2021 04:08 PM    .9 (H) 04/14/2021 04:08 PM    MCH 38.4 (H) 04/14/2021 04:08 PM    MCHC 33.4 (L) 04/14/2021 04:08 PM    MPV 10.4 04/14/2021 04:08 PM    NEUTSPOLYS 31.80 (L) 04/14/2021 04:08 PM    LYMPHOCYTES 60.20 (H) 04/14/2021 04:08 PM    MONOCYTES 6.40 04/14/2021 04:08 PM    EOSINOPHILS 0.80 04/14/2021 04:08 PM    BASOPHILS 0.80 04/14/2021 04:08 PM    ANISOCYTOSIS 1+ 11/16/2020 10:53 AM      Lab Results   Component Value Date/Time    SODIUM 141 04/14/2021 04:08 PM    POTASSIUM 4.2 04/14/2021 04:08 PM    CHLORIDE 107 04/14/2021 04:08 PM    CO2 25 04/14/2021 04:08 PM    GLUCOSE 95 04/14/2021 04:08 PM    BUN 19 04/14/2021 04:08 PM    CREATININE 0.89 04/14/2021 04:08 PM          Pt is new to Eliquis and new to [facility]. Discussed:    · Indication for DOAC therapy.  · Importance of monitoring and compliance.   · Monitoring parameters, signs and symptoms of bleeding or clotting.  · DOAC therapy, side effects, potential DDIs, OTC medications  · Hormonal therapy - None  · Pregnancy - N/a  · DDI - No significant DDI noted  · Pt is not on antiplatelet/NSAID therapy.   · Lifestyle safety, ie smoking, ETOH, hobby safety, fall safety/prevention  · Procedures for missed doses or suspected missed doses, surgeries/procedures, travel, dental work, any medication changes    Pt started with Eliquis 10mg taken 2 times a day for 1 week on 12/3/21 and then decreased to 5mg taken 2 times daily thereafter. Pt transitioned to 5 mg BID dose on 12/10/21.    DOAC affordable = TBD    Samples provided: Yes    Labs to be completed prior to next f/u - CBC, CMP (Pt goes to [facility]). Pt to go w/in 7 days as well as prior to f/u.    F/U - 3 months    Kareem Fernandez, PharmD, BCACP      Added [facility] to care team   CC: Dr. Bloch

## 2021-12-16 NOTE — PROGRESS NOTES
Called and requested 12/2/21 US report from [facility] be sent to [facility].    To be scanned into media.    Kareem Fernandez PharmD

## 2021-12-16 NOTE — PROGRESS NOTES
Initial anticoag note and most recent ED note reviewed.  Pending further recs from her oncologist we will continue indefinite oac with eliquis for malignancy associated dvt.    Michael Bloch, MD  Anticoagulation Clinic    Cc:  SIMON Granado

## 2021-12-17 ENCOUNTER — DOCUMENTATION (OUTPATIENT)
Dept: VASCULAR LAB | Facility: MEDICAL CENTER | Age: 56
End: 2021-12-17

## 2021-12-17 ENCOUNTER — HOSPITAL ENCOUNTER (OUTPATIENT)
Dept: RADIATION ONCOLOGY | Facility: MEDICAL CENTER | Age: 56
End: 2021-12-31
Attending: RADIOLOGY
Payer: COMMERCIAL

## 2021-12-17 DIAGNOSIS — C79.31 BRAIN METASTASES: ICD-10-CM

## 2021-12-17 NOTE — PROGRESS NOTES
Submitted PA for Eliquis via CoverMyMeds (Key: [id number]) - PA approved.    Notified pharmacy who will contact ptKirstin Fernandez PharmD

## 2021-12-17 NOTE — PROGRESS NOTES
Nick PA - Approval     Key: [id number]  Eliquis 5mg Tablets   CaseID: [id number]  Status: APPROVED

## 2021-12-17 NOTE — ADDENDUM NOTE
Encounter addended by: Merlene Darnell M.D. on: 12/17/2021 2:55 PM   Actions taken: Clinical Note Signed

## 2021-12-17 NOTE — PROGRESS NOTES
RADIATION ONCOLOGY FOLLOW-UP    DATE OF SERVICE: 12/17/2021  Video visit as a means of avoiding spread of Covid this visit is being conducted by video.  This video visit was initiated by patient and they verbally consented.    IDENTIFICATION:   A 56 y.o. female with metastatic estrogen positive breast cancer to the brain now with 5 new brain metastasis status post SRS complete 2/26/2022 a left mary lesion,  right and left parietal occipital lesions, right temporal lesion, and right occipital lesion.  She had previously been treated to a left occipital lesion following resection and stereotactic radiotherapy with CyberKnife in 2017.Now status post stereotactic radiosurgery to multiple lesions complete 11/10/2020.   Status post additional radiosurgery to the brain complete 10/6/2021.  Here to review the most recent MRI results.     HISTORY OF PRESENT ILLNESS:   Since last seen patient has started on Xeloda she started that on 1120.  She has been completely wiped out and pretty much bedridden.  Unfortunately this was complicated by development of DVTs and she is currently on Eliquis.  They had a long conversation with Dr. Granado over the weekend and were considering hospice.  However they had a change of heart and wanted to see if they could finish out the Xeloda and go from there.  She is still currently requiring 12 mg of dexamethasone a day.MRI scan of the brain was done 12/15/2021.  On the report it shows that there is no new lesions however we carefully examined this and we feel that there is at least 3 very small millimeter or less new lesions.  They are not associated with swelling.    CURRENT MEDICATIONS:  Current Outpatient Medications   Medication Sig Dispense Refill   • apixaban (ELIQUIS) 5mg Tab Take 1 Tablet by mouth 2 times a day. 180 Tablet 3   • dexamethasone (DECADRON) 4 MG Tab Take 4 mg by mouth 2 times a day.     • furosemide (LASIX) 20 MG Tab Take 20 mg by mouth every day. 2 day course (Patient not  taking: Reported on 12/15/2021)     • capecitabine (XELODA) 500 MG tablet Take 1,500 mg by mouth 2 times a day. Take with 300 mg (two 150 mg) for a total of 1800 mg     • capecitabine (XELODA) 150 MG tablet Take 300 mg by mouth 2 times a day. Take with 1500 mg (three 500 mg) for a total of 1800 mg     • raNITIdine HCl (ZANTAC PO) Take 1 Tablet by mouth every day.     • denosumab (XGEVA) 120 MG/1.7ML injection Inject 120 mg under the skin every 3 months.     • CALCIUM PO Take 600 mg by mouth 2 times a day.       No current facility-administered medications for this encounter.       ALLERGIES:  Patient has no known allergies.    FAMILY HISTORY:    Family History   Problem Relation Age of Onset   • Cancer Father         Prostate cancer/colon cancer   [unfilled]        SOCIAL HISTORY:     reports that she has never smoked. She has never used smokeless tobacco. She reports that she does not drink alcohol and does not use drugs.    PAIN: No pain    REVIEW OF SYSTEMS: Is significant for Extreme fatigue slight diarrhea from the Xeloda slight confusion and cushingoid features from dexamethasone.  The rest of the review of systems has been reviewed.    PHYSICAL EXAM:     ECOG PERFORMANCE STATUS:  3 = Capable of only limited self care, confined to bed or chair more than 50% of waking hours   There were no vitals filed for this visit.         GENERAL: Alert oriented x3 in no major distress distress resting in a chair at home.  Obviously cushingoid  HEENT:  Pupils are equal, round, and reactive to light.  Extraocular muscles   are intact. Sclerae nonicteric.  Conjunctivae pink.  Oral cavity, tongue   protrudes midline.     NEUROLOGIC:  Cranial nerves II through XII were intact. Upper extremities within normal limits        IMPRESSION:    A 56 y.o. with metastatic estrogen positive breast cancer to the brain now with 5 new brain metastasis status post SRS complete 2/26/2022 a left mary lesion,  right and left parietal occipital  lesions, right temporal lesion, and right occipital lesion.  She had previously been treated to a left occipital lesion following resection and stereotactic radiotherapy with CyberKnife in 2017.Now status post stereotactic radiosurgery to multiple lesions complete 11/10/2020.   Status post additional radiosurgery to the brain complete 10/6/2021.    RECOMMENDATIONS:     The MRI scan shows no progression however on careful review I think a C3 submillimeter lesions that have no associated swelling with him.  Patient has started on Xeloda and is having a difficult time with that and her situation has been complicated by a DVT and is currently on Eliquis.  She had a discussion with hospice with Dr. Granado over the weekend but has decided that she would like to complete her course of Xeloda and see how she does with that.  As far as the brain metastasis are concerned.  We have agreed to do is to repeat an MRI scan in January and see if they are stable or if there are further lesions at that time.  We can discuss whether we would want to consider SRS at that time.  I am concerned at this time that there would not be much benefit to SRS depending on how her systemic disease response to Xeloda.In any event we have agreed to get the MRI scan in a month and we will go from there.      Thank you for the opportunity to participate in her care.  If any questions or comments, please do not hesitate in calling.      Please note that this dictation was created using voice recognition software. I have made every reasonable attempt to correct obvious errors, but I expect that there are errors of grammar and possibly content that I did not discover before finalizing the note.    Video visit lasted from 2:30-2:50 time spent 20 minutes.

## 2021-12-22 ENCOUNTER — APPOINTMENT (OUTPATIENT)
Dept: RADIOLOGY | Facility: MEDICAL CENTER | Age: 56
DRG: 378 | End: 2021-12-22
Attending: EMERGENCY MEDICINE
Payer: COMMERCIAL

## 2021-12-22 ENCOUNTER — HOSPITAL ENCOUNTER (INPATIENT)
Facility: MEDICAL CENTER | Age: 56
LOS: 2 days | DRG: 378 | End: 2021-12-24
Attending: EMERGENCY MEDICINE | Admitting: FAMILY MEDICINE
Payer: COMMERCIAL

## 2021-12-22 DIAGNOSIS — C79.31 BREAST CANCER METASTASIZED TO BRAIN, LEFT (HCC): ICD-10-CM

## 2021-12-22 DIAGNOSIS — C50.912 BREAST CANCER METASTASIZED TO BRAIN, LEFT (HCC): ICD-10-CM

## 2021-12-22 PROBLEM — R55 SYNCOPE AND COLLAPSE: Status: ACTIVE | Noted: 2021-12-22

## 2021-12-22 LAB
ABO GROUP BLD: NORMAL
ALBUMIN SERPL BCP-MCNC: 3 G/DL (ref 3.2–4.9)
ALBUMIN/GLOB SERPL: 2.3 G/DL
ALP SERPL-CCNC: 65 U/L (ref 30–99)
ALT SERPL-CCNC: 172 U/L (ref 2–50)
ANION GAP SERPL CALC-SCNC: 11 MMOL/L (ref 7–16)
APTT PPP: <20 SEC (ref 24.7–36)
AST SERPL-CCNC: 43 U/L (ref 12–45)
BASOPHILS # BLD AUTO: 1 % (ref 0–1.8)
BASOPHILS # BLD: 0.03 K/UL (ref 0–0.12)
BILIRUB SERPL-MCNC: 1 MG/DL (ref 0.1–1.5)
BLD GP AB SCN SERPL QL: NORMAL
BUN SERPL-MCNC: 21 MG/DL (ref 8–22)
CALCIUM SERPL-MCNC: 6.9 MG/DL (ref 8.5–10.5)
CHLORIDE SERPL-SCNC: 104 MMOL/L (ref 96–112)
CO2 SERPL-SCNC: 20 MMOL/L (ref 20–33)
CREAT SERPL-MCNC: 0.43 MG/DL (ref 0.5–1.4)
EOSINOPHIL # BLD AUTO: 0 K/UL (ref 0–0.51)
EOSINOPHIL NFR BLD: 0 % (ref 0–6.9)
ERYTHROCYTE [DISTWIDTH] IN BLOOD BY AUTOMATED COUNT: 57.2 FL (ref 35.9–50)
GLOBULIN SER CALC-MCNC: 1.3 G/DL (ref 1.9–3.5)
GLUCOSE SERPL-MCNC: 252 MG/DL (ref 65–99)
HCT VFR BLD AUTO: 30.3 % (ref 37–47)
HCT VFR BLD AUTO: 30.4 % (ref 37–47)
HCT VFR BLD AUTO: 31.2 % (ref 37–47)
HGB BLD-MCNC: 10.1 G/DL (ref 12–16)
HGB BLD-MCNC: 10.2 G/DL (ref 12–16)
HGB BLD-MCNC: 10.8 G/DL (ref 12–16)
IMM GRANULOCYTES # BLD AUTO: 0.03 K/UL (ref 0–0.11)
IMM GRANULOCYTES NFR BLD AUTO: 1 % (ref 0–0.9)
INR PPP: 1.63 (ref 0.87–1.13)
LIPASE SERPL-CCNC: 45 U/L (ref 11–82)
LYMPHOCYTES # BLD AUTO: 0.47 K/UL (ref 1–4.8)
LYMPHOCYTES NFR BLD: 16.1 % (ref 22–41)
MCH RBC QN AUTO: 35.1 PG (ref 27–33)
MCHC RBC AUTO-ENTMCNC: 33.2 G/DL (ref 33.6–35)
MCV RBC AUTO: 105.6 FL (ref 81.4–97.8)
MONOCYTES # BLD AUTO: 0.03 K/UL (ref 0–0.85)
MONOCYTES NFR BLD AUTO: 1 % (ref 0–13.4)
NEUTROPHILS # BLD AUTO: 2.36 K/UL (ref 2–7.15)
NEUTROPHILS NFR BLD: 80.9 % (ref 44–72)
NRBC # BLD AUTO: 0.02 K/UL
NRBC BLD-RTO: 0.7 /100 WBC
PLATELET # BLD AUTO: 56 K/UL (ref 164–446)
PMV BLD AUTO: 10.9 FL (ref 9–12.9)
POTASSIUM SERPL-SCNC: 3.9 MMOL/L (ref 3.6–5.5)
PROT SERPL-MCNC: 4.3 G/DL (ref 6–8.2)
PROTHROMBIN TIME: 18.9 SEC (ref 12–14.6)
RBC # BLD AUTO: 2.88 M/UL (ref 4.2–5.4)
RH BLD: NORMAL
SODIUM SERPL-SCNC: 135 MMOL/L (ref 135–145)
WBC # BLD AUTO: 2.9 K/UL (ref 4.8–10.8)

## 2021-12-22 PROCEDURE — 85730 THROMBOPLASTIN TIME PARTIAL: CPT

## 2021-12-22 PROCEDURE — 71045 X-RAY EXAM CHEST 1 VIEW: CPT

## 2021-12-22 PROCEDURE — 86850 RBC ANTIBODY SCREEN: CPT

## 2021-12-22 PROCEDURE — 85610 PROTHROMBIN TIME: CPT

## 2021-12-22 PROCEDURE — 700102 HCHG RX REV CODE 250 W/ 637 OVERRIDE(OP): Performed by: STUDENT IN AN ORGANIZED HEALTH CARE EDUCATION/TRAINING PROGRAM

## 2021-12-22 PROCEDURE — 86901 BLOOD TYPING SEROLOGIC RH(D): CPT

## 2021-12-22 PROCEDURE — 83690 ASSAY OF LIPASE: CPT

## 2021-12-22 PROCEDURE — 36415 COLL VENOUS BLD VENIPUNCTURE: CPT

## 2021-12-22 PROCEDURE — 85014 HEMATOCRIT: CPT

## 2021-12-22 PROCEDURE — 80053 COMPREHEN METABOLIC PANEL: CPT

## 2021-12-22 PROCEDURE — 99222 1ST HOSP IP/OBS MODERATE 55: CPT | Mod: GC | Performed by: FAMILY MEDICINE

## 2021-12-22 PROCEDURE — 85018 HEMOGLOBIN: CPT

## 2021-12-22 PROCEDURE — 770020 HCHG ROOM/CARE - TELE (206)

## 2021-12-22 PROCEDURE — 85025 COMPLETE CBC W/AUTO DIFF WBC: CPT

## 2021-12-22 PROCEDURE — 99285 EMERGENCY DEPT VISIT HI MDM: CPT

## 2021-12-22 PROCEDURE — A9270 NON-COVERED ITEM OR SERVICE: HCPCS | Performed by: STUDENT IN AN ORGANIZED HEALTH CARE EDUCATION/TRAINING PROGRAM

## 2021-12-22 PROCEDURE — 86900 BLOOD TYPING SEROLOGIC ABO: CPT

## 2021-12-22 RX ORDER — OMEPRAZOLE 20 MG/1
20 CAPSULE, DELAYED RELEASE ORAL DAILY
Status: DISCONTINUED | OUTPATIENT
Start: 2021-12-22 | End: 2021-12-24 | Stop reason: HOSPADM

## 2021-12-22 RX ORDER — BISACODYL 10 MG
10 SUPPOSITORY, RECTAL RECTAL
Status: DISCONTINUED | OUTPATIENT
Start: 2021-12-22 | End: 2021-12-24 | Stop reason: HOSPADM

## 2021-12-22 RX ORDER — LABETALOL HYDROCHLORIDE 5 MG/ML
10 INJECTION, SOLUTION INTRAVENOUS EVERY 4 HOURS PRN
Status: DISCONTINUED | OUTPATIENT
Start: 2021-12-22 | End: 2021-12-24 | Stop reason: HOSPADM

## 2021-12-22 RX ORDER — AMOXICILLIN 250 MG
2 CAPSULE ORAL 2 TIMES DAILY
Status: DISCONTINUED | OUTPATIENT
Start: 2021-12-22 | End: 2021-12-24 | Stop reason: HOSPADM

## 2021-12-22 RX ORDER — DEXAMETHASONE 4 MG/1
4 TABLET ORAL 2 TIMES DAILY WITH MEALS
Status: DISCONTINUED | OUTPATIENT
Start: 2021-12-22 | End: 2021-12-24 | Stop reason: HOSPADM

## 2021-12-22 RX ORDER — CALCIUM CARBONATE 500 MG/1
500 TABLET, CHEWABLE ORAL 2 TIMES DAILY
Status: DISCONTINUED | OUTPATIENT
Start: 2021-12-22 | End: 2021-12-24 | Stop reason: HOSPADM

## 2021-12-22 RX ORDER — GUAIFENESIN/DEXTROMETHORPHAN 100-10MG/5
10 SYRUP ORAL EVERY 6 HOURS PRN
Status: DISCONTINUED | OUTPATIENT
Start: 2021-12-22 | End: 2021-12-24 | Stop reason: HOSPADM

## 2021-12-22 RX ORDER — ACETAMINOPHEN 325 MG/1
650 TABLET ORAL EVERY 6 HOURS PRN
Status: DISCONTINUED | OUTPATIENT
Start: 2021-12-22 | End: 2021-12-24 | Stop reason: HOSPADM

## 2021-12-22 RX ORDER — POLYETHYLENE GLYCOL 3350 17 G/17G
1 POWDER, FOR SOLUTION ORAL
Status: DISCONTINUED | OUTPATIENT
Start: 2021-12-22 | End: 2021-12-24 | Stop reason: HOSPADM

## 2021-12-22 RX ORDER — RANITIDINE 150 MG/1
150 TABLET ORAL DAILY
Status: DISCONTINUED | OUTPATIENT
Start: 2021-12-22 | End: 2021-12-22

## 2021-12-22 RX ADMIN — OMEPRAZOLE 20 MG: 20 CAPSULE, DELAYED RELEASE ORAL at 18:25

## 2021-12-22 RX ADMIN — CALCIUM CARBONATE 500 MG: 500 TABLET, CHEWABLE ORAL at 18:25

## 2021-12-22 RX ADMIN — DEXAMETHASONE 4 MG: 4 TABLET ORAL at 18:25

## 2021-12-22 ASSESSMENT — PAIN DESCRIPTION - PAIN TYPE: TYPE: ACUTE PAIN

## 2021-12-22 ASSESSMENT — LIFESTYLE VARIABLES: DO YOU DRINK ALCOHOL: NO

## 2021-12-22 ASSESSMENT — FIBROSIS 4 INDEX: FIB4 SCORE: 2.03

## 2021-12-22 NOTE — ED PROVIDER NOTES
ED Provider Note    CHIEF COMPLAINT  Chief Complaint   Patient presents with   • Syncope     transfer from [facility] for lower gi bleed       HPI  Michelle Diana is a 56 y.o. female who presents here as a transfer for GI bleed.  Duration has been since this morning.  Is described as grossly bloody.  There is been no alleviating factors it is spontaneous is associate with multiple episodes.  And associate with lightheadedness.    The patient unfortunately history of breast cancer with mets to the brain.  She recently had a diagnosis of DVT is on Eliquis.  According to the note she is currently still on Eliquis she is unknown if she took a dose this morning.  Her  gives her medications.  She is here for transfer and higher level of care    REVIEW OF SYSTEMS  General: Generalized weakness  Eyes: Eye discharge  Pulmonary no difficulty breathing  GI see above no abdominal pain  Pulmonary: No difficulty breathing      PAST MEDICAL HISTORY  Past Medical History:   Diagnosis Date   • Cancer (HCC)     Metastatic breast cancer dx 2016   • Chemotherapy adverse reaction     on oral abemicicilb and faslodex   • Heart burn    • High cholesterol    • History of radiation therapy     2016 Left occipital and 2017 left breast  [place]       FAMILY HISTORY  Family History   Problem Relation Age of Onset   • Cancer Father         Prostate cancer/colon cancer       SOCIAL HISTORY  Social History     Socioeconomic History   • Marital status:      Spouse name: Not on file   • Number of children: Not on file   • Years of education: Not on file   • Highest education level: Not on file   Occupational History   • Not on file   Tobacco Use   • Smoking status: Never Smoker   • Smokeless tobacco: Never Used   Vaping Use   • Vaping Use: Never used   Substance and Sexual Activity   • Alcohol use: No   • Drug use: No   • Sexual activity: Not on file   Other Topics Concern   • Not on file   Social History Narrative   •  Not on file     Social Determinants of Health     Financial Resource Strain:    • Difficulty of Paying Living Expenses: Not on file   Food Insecurity:    • Worried About Running Out of Food in the Last Year: Not on file   • Ran Out of Food in the Last Year: Not on file   Transportation Needs:    • Lack of Transportation (Medical): Not on file   • Lack of Transportation (Non-Medical): Not on file   Physical Activity:    • Days of Exercise per Week: Not on file   • Minutes of Exercise per Session: Not on file   Stress:    • Feeling of Stress : Not on file   Social Connections:    • Frequency of Communication with Friends and Family: Not on file   • Frequency of Social Gatherings with Friends and Family: Not on file   • Attends Ep[employer] Services: Not on file   • Active Member of Clubs or Organizations: Not on file   • Attends Club or Organization Meetings: Not on file   • Marital Status: Not on file   Intimate Partner Violence:    • Fear of Current or Ex-Partner: Not on file   • Emotionally Abused: Not on file   • Physically Abused: Not on file   • Sexually Abused: Not on file   Housing Stability:    • Unable to Pay for Housing in the Last Year: Not on file   • Number of Places Lived in the Last Year: Not on file   • Unstable Housing in the Last Year: Not on file       SURGICAL HISTORY  Past Surgical History:   Procedure Laterality Date   • CRANIOTOMY STEALTH Right 3/3/2021    Procedure: CRANIOTOMY, USING FRAMELESS STEREOTAXY-FOR TUMOR;  Surgeon: German Snowden M.D.;  Location: [facility];  Service: Neurosurgery   • LYMPH NODE EXCISION Left 6/26/2018    Procedure: LYMPH NODE EXCISION - CERVICAL NODE BX;  Surgeon: Nataliia Acevedo M.D.;  Location: [facility];  Service: General   • OTHER  2017    lymph node from neck removed   • OTHER  2016    partial mastectomy   • LUMPECTOMY Left     12/2016       CURRENT MEDICATIONS  Home Medications    **Home medications have not yet been reviewed for  "this encounter**          ALLERGIES  No Known Allergies    PHYSICAL EXAM  VITAL SIGNS: /75   Pulse 87   Temp 36.2 °C (97.2 °F) (Temporal)   Resp 18   Ht 1.651 m (5' 5\")   Wt 72.6 kg (160 lb)   SpO2 92%   BMI 26.63 kg/m²   Constitutional: Well-nourished female 48 with round facies no acute distress  HENT: Round facies oropharynx moist no exudate no erythema  Eyes: PERRLA, EOMI, Conjunctiva normal, No discharge.   Musculoskeletal: Neck has normal range of motion, No tenderness, Supple.   Lymphatic: No cervical lymphadenopathy noted.   Cardiovascular: Normal heart rate, Normal rhythm, No murmurs, No rubs, No gallops.   Thorax & Lungs: Normal breath sounds, No respiratory distress, No wheezing, No chest tenderness.   Abdomen: Distended nontender soft  Skin: Warm, Dry, No erythema, No rash.   : No CVA tenderness.   Psychiatric: Calm, not anxious  Neurologic: Alert & oriented, moves all extremities equally    RADIOLOGY/PROCEDURES  Results for orders placed or performed during the hospital encounter of 12/22/21   COD (ADULT)   Result Value Ref Range    ABO Grouping Only O     Rh Grouping Only POS     Antibody Screen-Cod NEG    CBC WITH DIFFERENTIAL   Result Value Ref Range    WBC 2.9 (L) 4.8 - 10.8 K/uL    RBC 2.88 (L) 4.20 - 5.40 M/uL    Hemoglobin 10.1 (L) 12.0 - 16.0 g/dL    Hematocrit 30.4 (L) 37.0 - 47.0 %    .6 (H) 81.4 - 97.8 fL    MCH 35.1 (H) 27.0 - 33.0 pg    MCHC 33.2 (L) 33.6 - 35.0 g/dL    RDW 57.2 (H) 35.9 - 50.0 fL    Platelet Count 56 (L) 164 - 446 K/uL    MPV 10.9 9.0 - 12.9 fL    Neutrophils-Polys 80.90 (H) 44.00 - 72.00 %    Lymphocytes 16.10 (L) 22.00 - 41.00 %    Monocytes 1.00 0.00 - 13.40 %    Eosinophils 0.00 0.00 - 6.90 %    Basophils 1.00 0.00 - 1.80 %    Immature Granulocytes 1.00 (H) 0.00 - 0.90 %    Nucleated RBC 0.70 /100 WBC    Neutrophils (Absolute) 2.36 2.00 - 7.15 K/uL    Lymphs (Absolute) 0.47 (L) 1.00 - 4.80 K/uL    Monos (Absolute) 0.03 0.00 - 0.85 K/uL    Eos " (Absolute) 0.00 0.00 - 0.51 K/uL    Baso (Absolute) 0.03 0.00 - 0.12 K/uL    Immature Granulocytes (abs) 0.03 0.00 - 0.11 K/uL    NRBC (Absolute) 0.02 K/uL   COMP METABOLIC PANEL   Result Value Ref Range    Sodium 135 135 - 145 mmol/L    Potassium 3.9 3.6 - 5.5 mmol/L    Chloride 104 96 - 112 mmol/L    Co2 20 20 - 33 mmol/L    Anion Gap 11.0 7.0 - 16.0    Glucose 252 (H) 65 - 99 mg/dL    Bun 21 8 - 22 mg/dL    Creatinine 0.43 (L) 0.50 - 1.40 mg/dL    Calcium 6.9 (LL) 8.5 - 10.5 mg/dL    AST(SGOT) 43 12 - 45 U/L    ALT(SGPT) 172 (H) 2 - 50 U/L    Alkaline Phosphatase 65 30 - 99 U/L    Total Bilirubin 1.0 0.1 - 1.5 mg/dL    Albumin 3.0 (L) 3.2 - 4.9 g/dL    Total Protein 4.3 (L) 6.0 - 8.2 g/dL    Globulin 1.3 (L) 1.9 - 3.5 g/dL    A-G Ratio 2.3 g/dL   LIPASE   Result Value Ref Range    Lipase 45 11 - 82 U/L   PROTHROMBIN TIME   Result Value Ref Range    PT 18.9 (H) 12.0 - 14.6 sec    INR 1.63 (H) 0.87 - 1.13   APTT   Result Value Ref Range    APTT <20.0 (L) 24.7 - 36.0 sec   ESTIMATED GFR   Result Value Ref Range    GFR If African American >60 >60 mL/min/1.73 m 2    GFR If Non African American >60 >60 mL/min/1.73 m 2        COURSE & MEDICAL DECISION MAKING  Pertinent Labs & Imaging studies reviewed. (See chart for details)  Patient is here with a GI bleed will admit her to the hospital for observation.  She is uncertain if she took her Eliquis this morning    GI bleed  Patient is not tachycardic.  She already received fluids from the other facility will hold fluids    Plan  Serial H&H  Call hospitalist  Consult GI if hospitalist would like me to do an urgent consult  Hold Eliquis    [facility]r was notified and they will admit the patient    GI was notified and they will also see the patient today.    The patient's hemoglobin is stayed at 10.  Therefore I think patient can be safely admitted for serial adequate's and evaluation.  INR no to be elevated 1.6.  FINAL IMPRESSION  1.  Lower GI bleed  2.   3.      Electronically  signed by: Talat Pyle M.D., 12/22/2021 1:37 PM

## 2021-12-22 NOTE — ED NOTES
Med rec completed per patient  Allergies reviewed  No PO Antibiotics in the last 30 days     Preferred pharmacy is [facility] in [facility]

## 2021-12-22 NOTE — ED TRIAGE NOTES
Chief Complaint   Patient presents with   • Syncope     transfer from [facility] for lower gi bleed     Pt bib ems trf from [facility] for lower gi bleed . Pt has breast cancer mets to brain .

## 2021-12-22 NOTE — H&P
"[facility] MEDICINE HISTORY AND PHYSICAL     PATIENT ID:  NAME:  Michelle Diana  MRN:               [id number]  YOB: 1965    Date of Admission: 12/22/2021     Attending: Ladan Mix MD    Resident: Gamaliel Ludwig DO    Primary Care Physician: none in chart    CC:  syncope    HPI: Michelle Diana is a 56 y.o. female with past medical history of metastatic breast cancer who was transferred from [place] with syncope in the setting of GI bleed. Patient notes that she had one episode of syncope last night and she fell and hit her head. She notes that she had another episode of syncope today but her son was able to help her to the ground in her bathroom. She notes that she could feel like she was going to pass out before both episodes. She denied any chest pain, palpitations or shortness of breath with either episode. No changes in vision prior to either episode. She is not sure how long she was \"out\" for with either episode. She has associated dark stools for the past day. She notes that she had been constipated for four days and then over the past day was having bowel movements again but they were dark.     Patient has a PMH of DVT and is on Eliquis. She is not sure when the last time she took it was. She is currently being treated with oral chemotherapy but she notes that she would like to stop taking it because it makes her feel very weak.     ERCourse:  She presented to the [place] ED today where CT head was negative for acute bleed but showed metastases. Hgb dropped from 13 to 10 and in the setting of GI bleed, patient was transferred to [facility] for higher level of care. In the [facility] ED, patient had normal vital signs and Hgb was stable at 10. Patient had bloody bowel movement in the [facility] ED. GI was consulted from the ED.    REVIEW OF SYSTEMS:   Ten systems reviewed and were negative except as noted in the HPI.                PAST MEDICAL HISTORY:  Past Medical History:   Diagnosis Date   • Cancer (HCC) "     Metastatic breast cancer dx 2016   • Chemotherapy adverse reaction     on oral abemicicilb and faslodex   • Heart burn    • High cholesterol    • History of radiation therapy     2016 Left occipital and 2017 left breast  [place]       PAST SURGICAL HISTORY:  Past Surgical History:   Procedure Laterality Date   • CRANIOTOMY STEALTH Right 3/3/2021    Procedure: CRANIOTOMY, USING FRAMELESS STEREOTAXY-FOR TUMOR;  Surgeon: Geramn Snowden M.D.;  Location: SURGERY [facility];  Service: Neurosurgery   • LYMPH NODE EXCISION Left 6/26/2018    Procedure: LYMPH NODE EXCISION - CERVICAL NODE BX;  Surgeon: Nataliia Acevedo M.D.;  Location: SURGERY [facility];  Service: General   • OTHER  2017    lymph node from neck removed   • OTHER  2016    partial mastectomy   • LUMPECTOMY Left     12/2016       FAMILY HISTORY:  Family History   Problem Relation Age of Onset   • Cancer Father         Prostate cancer/colon cancer       SOCIAL HISTORY:   Pt lives: at home with family      ALLERGIES:  No Known Allergies    OUTPATIENT MEDICATIONS:  No current facility-administered medications on file prior to encounter.     Current Outpatient Medications on File Prior to Encounter   Medication Sig Dispense Refill   • apixaban (ELIQUIS) 5mg Tab Take 1 Tablet by mouth 2 times a day. 180 Tablet 3   • dexamethasone (DECADRON) 4 MG Tab Take 4 mg by mouth 2 times a day.     • capecitabine (XELODA) 500 MG tablet Take 1,500 mg by mouth 2 times a day. Take with 300 mg (two 150 mg) for a total of 1800 mg     • capecitabine (XELODA) 150 MG tablet Take 300 mg by mouth 2 times a day. Take with 1500 mg (three 500 mg) for a total of 1800 mg     • raNITIdine HCl (ZANTAC PO) Take 1 Tablet by mouth every day.     • denosumab (XGEVA) 120 MG/1.7ML injection Inject 120 mg under the skin every 3 months.     • CALCIUM PO Take 600 mg by mouth 2 times a day.         PHYSICAL EXAM:  Vitals:    12/22/21 1332 12/22/21 1402 12/22/21 1432 12/22/21 1502   BP:  122/82 134/74 105/69 116/63   Pulse: 85 71 74 65   Resp: 18 18 18 18   Temp:       TempSrc:       SpO2: 95% 94% 94% 91%   Weight:       Height:       , Temp (24hrs), Av.2 °C (97.2 °F), Min:36.2 °C (97.2 °F), Max:36.2 °C (97.2 °F)  , Pulse Oximetry: 91 %, O2 Delivery Device: None - Room Air    General: Pt resting in NAD, appears fatigued, pleasant, conversational  Skin:  Pink, warm and dry.  No rashes  HEENT: NC/AT. PERRL. EOMI. MMM. No nasal discharge.  Neck:  Supple without lymphadenopathy or rigidity. No JVD   Lungs:  Symmetrical.  CTAB with no W/R/R.  Good air movement   Cardiovascular:  S1/S2 RRR without M/R/G.  Abdomen:  Abdomen is soft NT/ND. +BS. No masses noted.  : blood noted at rectum with stool  Extremities:  LLE with left calf larger than right. 2+ pulses in all extremities.  CNS:  Cranial nerves II-XII grossly intact. A/O x4         LAB TESTS:   Results for orders placed or performed during the hospital encounter of 21   COD (ADULT)   Result Value Ref Range    ABO Grouping Only O     Rh Grouping Only POS     Antibody Screen-Cod NEG    CBC WITH DIFFERENTIAL   Result Value Ref Range    WBC 2.9 (L) 4.8 - 10.8 K/uL    RBC 2.88 (L) 4.20 - 5.40 M/uL    Hemoglobin 10.1 (L) 12.0 - 16.0 g/dL    Hematocrit 30.4 (L) 37.0 - 47.0 %    .6 (H) 81.4 - 97.8 fL    MCH 35.1 (H) 27.0 - 33.0 pg    MCHC 33.2 (L) 33.6 - 35.0 g/dL    RDW 57.2 (H) 35.9 - 50.0 fL    Platelet Count 56 (L) 164 - 446 K/uL    MPV 10.9 9.0 - 12.9 fL    Neutrophils-Polys 80.90 (H) 44.00 - 72.00 %    Lymphocytes 16.10 (L) 22.00 - 41.00 %    Monocytes 1.00 0.00 - 13.40 %    Eosinophils 0.00 0.00 - 6.90 %    Basophils 1.00 0.00 - 1.80 %    Immature Granulocytes 1.00 (H) 0.00 - 0.90 %    Nucleated RBC 0.70 /100 WBC    Neutrophils (Absolute) 2.36 2.00 - 7.15 K/uL    Lymphs (Absolute) 0.47 (L) 1.00 - 4.80 K/uL    Monos (Absolute) 0.03 0.00 - 0.85 K/uL    Eos (Absolute) 0.00 0.00 - 0.51 K/uL    Baso (Absolute) 0.03 0.00 - 0.12 K/uL     Immature Granulocytes (abs) 0.03 0.00 - 0.11 K/uL    NRBC (Absolute) 0.02 K/uL   COMP METABOLIC PANEL   Result Value Ref Range    Sodium 135 135 - 145 mmol/L    Potassium 3.9 3.6 - 5.5 mmol/L    Chloride 104 96 - 112 mmol/L    Co2 20 20 - 33 mmol/L    Anion Gap 11.0 7.0 - 16.0    Glucose 252 (H) 65 - 99 mg/dL    Bun 21 8 - 22 mg/dL    Creatinine 0.43 (L) 0.50 - 1.40 mg/dL    Calcium 6.9 (LL) 8.5 - 10.5 mg/dL    AST(SGOT) 43 12 - 45 U/L    ALT(SGPT) 172 (H) 2 - 50 U/L    Alkaline Phosphatase 65 30 - 99 U/L    Total Bilirubin 1.0 0.1 - 1.5 mg/dL    Albumin 3.0 (L) 3.2 - 4.9 g/dL    Total Protein 4.3 (L) 6.0 - 8.2 g/dL    Globulin 1.3 (L) 1.9 - 3.5 g/dL    A-G Ratio 2.3 g/dL   LIPASE   Result Value Ref Range    Lipase 45 11 - 82 U/L   PROTHROMBIN TIME   Result Value Ref Range    PT 18.9 (H) 12.0 - 14.6 sec    INR 1.63 (H) 0.87 - 1.13   APTT   Result Value Ref Range    APTT <20.0 (L) 24.7 - 36.0 sec   ESTIMATED GFR   Result Value Ref Range    GFR If African American >60 >60 mL/min/1.73 m 2    GFR If Non African American >60 >60 mL/min/1.73 m 2       CULTURES:   Results     ** No results found for the last 168 hours. **          IMAGES:  DX-CHEST-PORTABLE (1 VIEW)   Final Result      Mild left basilar opacity probably representing atelectasis however correlate clinically for infection. Cannot exclude a trace left pleural effusion.          CONSULTS:   GI    ASSESSMENT/PLAN: 56 y.o. female admitted for syncope in the setting of GI bleed.    # syncope  # hematochezia  # anemia  Patient had one episode of syncope last night when she fell and hit her head. She had one episode of syncope this morning when she was helped down to the floor by her son. ECG in [place] ED with sinus rhythm. CT head at [place] negative for acute bleed. The history that patient gives sounds like a vasovagal event. Patient has noted recent dark stools and there was blood with her stool in the ED. This is likely contributing to her light headedness/  syncope. Hgb is stable at 10 from [place] ED to [facility].   - admit to Telemetry  - hold home Eliquis (on for history of DVT)  - GI consult  - trend H/H  - repeat CT head with acute worsening of mental status  - switch home ranitidine to PPI  - transfuse if Hgb <7    # metastatic breast cancer  Patient with history of metastatic breast cancer with brain metastases. She follows with Dr. Granado at [facility]. She has been taking oral chemotherapy but notes that it makes her feel very weak and she is interested in stopping it moving forward. She is interested in pursing Hospice/ palliative care. She takes daily steroids due to brain metastases.   - will reach out to Dr. Granado's office tomorrow to see if any recommendations  - Hospice/ Palliative consult while in the hospital  - holding oral chemotherapy for now  - continue home steroids for now ? If to continue given bleed    # hypocalcemia  Noted to be 6.9 in the ED.   - continue home Ca2+ supplementation    # elevated INR  Holding Eliquis given active GI bleed.     # leukopenia  # thrombocytopenia  Likely secondary to chemotherapy.     # Code status  - DNAR/ DNI    # Dispo- inpatient for syncope in setting of GI bleed

## 2021-12-22 NOTE — PALLIATIVE CARE
Palliative Care follow-up  Consult received and chart reviewed. Discussed case with Dr. Ludwig. Pt is being admitted for GI bleed. No urgent PC needs at this time. Patient will be seen after she is admitted.    Updated: MD     Plan: Full consult to follow    Thank you for allowing Palliative Care to support this patient and family. Contact [phone] for additional assistance, change in patient status, or with any questions/concerns.

## 2021-12-23 LAB
ANISOCYTOSIS BLD QL SMEAR: ABNORMAL
BASOPHILS # BLD AUTO: 0 % (ref 0–1.8)
BASOPHILS # BLD: 0 K/UL (ref 0–0.12)
EOSINOPHIL # BLD AUTO: 0 K/UL (ref 0–0.51)
EOSINOPHIL NFR BLD: 0 % (ref 0–6.9)
ERYTHROCYTE [DISTWIDTH] IN BLOOD BY AUTOMATED COUNT: 56.2 FL (ref 35.9–50)
HCT VFR BLD AUTO: 30.8 % (ref 37–47)
HGB BLD-MCNC: 10.9 G/DL (ref 12–16)
LYMPHOCYTES # BLD AUTO: 0.84 K/UL (ref 1–4.8)
LYMPHOCYTES NFR BLD: 36.5 % (ref 22–41)
MACROCYTES BLD QL SMEAR: ABNORMAL
MANUAL DIFF BLD: NORMAL
MCH RBC QN AUTO: 36.1 PG (ref 27–33)
MCHC RBC AUTO-ENTMCNC: 35.4 G/DL (ref 33.6–35)
MCV RBC AUTO: 102 FL (ref 81.4–97.8)
MICROCYTES BLD QL SMEAR: ABNORMAL
MONOCYTES # BLD AUTO: 0 K/UL (ref 0–0.85)
MONOCYTES NFR BLD AUTO: 0 % (ref 0–13.4)
MORPHOLOGY BLD-IMP: NORMAL
NEUTROPHILS # BLD AUTO: 1.46 K/UL (ref 2–7.15)
NEUTROPHILS NFR BLD: 56.5 % (ref 44–72)
NEUTS BAND NFR BLD MANUAL: 7 % (ref 0–10)
NRBC # BLD AUTO: 0.04 K/UL
NRBC BLD-RTO: 1.7 /100 WBC
PLATELET # BLD AUTO: 63 K/UL (ref 164–446)
PLATELET BLD QL SMEAR: NORMAL
PMV BLD AUTO: 11 FL (ref 9–12.9)
POIKILOCYTOSIS BLD QL SMEAR: NORMAL
RBC # BLD AUTO: 3.02 M/UL (ref 4.2–5.4)
RBC BLD AUTO: PRESENT
STOMATOCYTES BLD QL SMEAR: NORMAL
WBC # BLD AUTO: 2.3 K/UL (ref 4.8–10.8)

## 2021-12-23 PROCEDURE — 85027 COMPLETE CBC AUTOMATED: CPT

## 2021-12-23 PROCEDURE — 85007 BL SMEAR W/DIFF WBC COUNT: CPT

## 2021-12-23 PROCEDURE — 770020 HCHG ROOM/CARE - TELE (206)

## 2021-12-23 PROCEDURE — 97166 OT EVAL MOD COMPLEX 45 MIN: CPT

## 2021-12-23 PROCEDURE — 97535 SELF CARE MNGMENT TRAINING: CPT

## 2021-12-23 PROCEDURE — 36415 COLL VENOUS BLD VENIPUNCTURE: CPT

## 2021-12-23 PROCEDURE — 700102 HCHG RX REV CODE 250 W/ 637 OVERRIDE(OP): Performed by: STUDENT IN AN ORGANIZED HEALTH CARE EDUCATION/TRAINING PROGRAM

## 2021-12-23 PROCEDURE — A9270 NON-COVERED ITEM OR SERVICE: HCPCS | Performed by: STUDENT IN AN ORGANIZED HEALTH CARE EDUCATION/TRAINING PROGRAM

## 2021-12-23 PROCEDURE — 99232 SBSQ HOSP IP/OBS MODERATE 35: CPT | Mod: GC | Performed by: FAMILY MEDICINE

## 2021-12-23 PROCEDURE — 97162 PT EVAL MOD COMPLEX 30 MIN: CPT

## 2021-12-23 RX ORDER — TETRAHYDROZOLINE HCL 0.05 %
1 DROPS OPHTHALMIC (EYE) 4 TIMES DAILY PRN
Status: DISCONTINUED | OUTPATIENT
Start: 2021-12-23 | End: 2021-12-24 | Stop reason: HOSPADM

## 2021-12-23 RX ADMIN — CALCIUM CARBONATE 500 MG: 500 TABLET, CHEWABLE ORAL at 05:59

## 2021-12-23 RX ADMIN — OMEPRAZOLE 20 MG: 20 CAPSULE, DELAYED RELEASE ORAL at 05:57

## 2021-12-23 RX ADMIN — DEXAMETHASONE 4 MG: 4 TABLET ORAL at 18:20

## 2021-12-23 RX ADMIN — CALCIUM CARBONATE 500 MG: 500 TABLET, CHEWABLE ORAL at 16:29

## 2021-12-23 RX ADMIN — TETRAHYDROZOLINE HCL 1 DROP: 0.05 LIQUID OPHTHALMIC at 16:20

## 2021-12-23 RX ADMIN — DEXAMETHASONE 4 MG: 4 TABLET ORAL at 11:37

## 2021-12-23 ASSESSMENT — PAIN DESCRIPTION - PAIN TYPE
TYPE: ACUTE PAIN
TYPE: ACUTE PAIN

## 2021-12-23 ASSESSMENT — COGNITIVE AND FUNCTIONAL STATUS - GENERAL
MOBILITY SCORE: 13
SUGGESTED CMS G CODE MODIFIER DAILY ACTIVITY: CK
PERSONAL GROOMING: A LITTLE
STANDING UP FROM CHAIR USING ARMS: A LOT
TOILETING: A LOT
PERSONAL GROOMING: A LITTLE
STANDING UP FROM CHAIR USING ARMS: A LOT
DRESSING REGULAR UPPER BODY CLOTHING: A LOT
HELP NEEDED FOR BATHING: A LOT
SUGGESTED CMS G CODE MODIFIER MOBILITY: CM
CLIMB 3 TO 5 STEPS WITH RAILING: TOTAL
MOVING TO AND FROM BED TO CHAIR: UNABLE
EATING MEALS: A LITTLE
MOVING TO AND FROM BED TO CHAIR: A LOT
STANDING UP FROM CHAIR USING ARMS: A LOT
HELP NEEDED FOR BATHING: A LOT
MOVING FROM LYING ON BACK TO SITTING ON SIDE OF FLAT BED: A LOT
DRESSING REGULAR UPPER BODY CLOTHING: A LOT
DRESSING REGULAR LOWER BODY CLOTHING: A LOT
MOVING FROM LYING ON BACK TO SITTING ON SIDE OF FLAT BED: A LOT
WALKING IN HOSPITAL ROOM: A LOT
DAILY ACTIVITIY SCORE: 14
MOVING FROM LYING ON BACK TO SITTING ON SIDE OF FLAT BED: UNABLE
MOVING TO AND FROM BED TO CHAIR: A LOT
CLIMB 3 TO 5 STEPS WITH RAILING: A LOT
TURNING FROM BACK TO SIDE WHILE IN FLAT BAD: A LOT
SUGGESTED CMS G CODE MODIFIER DAILY ACTIVITY: CK
PERSONAL GROOMING: A LITTLE
EATING MEALS: A LITTLE
HELP NEEDED FOR BATHING: A LOT
DRESSING REGULAR LOWER BODY CLOTHING: A LOT
MOBILITY SCORE: 9
SUGGESTED CMS G CODE MODIFIER MOBILITY: CL
DAILY ACTIVITIY SCORE: 14
SUGGESTED CMS G CODE MODIFIER MOBILITY: CL
WALKING IN HOSPITAL ROOM: A LOT
TOILETING: A LOT
TOILETING: A LOT
WALKING IN HOSPITAL ROOM: A LOT
EATING MEALS: A LITTLE
MOBILITY SCORE: 12
TURNING FROM BACK TO SIDE WHILE IN FLAT BAD: A LITTLE
CLIMB 3 TO 5 STEPS WITH RAILING: A LOT
DRESSING REGULAR UPPER BODY CLOTHING: A LOT
TURNING FROM BACK TO SIDE WHILE IN FLAT BAD: A LOT

## 2021-12-23 ASSESSMENT — PATIENT HEALTH QUESTIONNAIRE - PHQ9
2. FEELING DOWN, DEPRESSED, IRRITABLE, OR HOPELESS: NOT AT ALL
SUM OF ALL RESPONSES TO PHQ9 QUESTIONS 1 AND 2: 0
1. LITTLE INTEREST OR PLEASURE IN DOING THINGS: NOT AT ALL

## 2021-12-23 ASSESSMENT — LIFESTYLE VARIABLES
HAVE YOU EVER FELT YOU SHOULD CUT DOWN ON YOUR DRINKING: NO
TOTAL SCORE: 0
AVERAGE NUMBER OF DAYS PER WEEK YOU HAVE A DRINK CONTAINING ALCOHOL: 0
CONSUMPTION TOTAL: NEGATIVE
ALCOHOL_USE: NO
HOW MANY TIMES IN THE PAST YEAR HAVE YOU HAD 5 OR MORE DRINKS IN A DAY: 0
DOES PATIENT WANT TO STOP DRINKING: NO
TOTAL SCORE: 0
TOTAL SCORE: 0
ON A TYPICAL DAY WHEN YOU DRINK ALCOHOL HOW MANY DRINKS DO YOU HAVE: 0
EVER FELT BAD OR GUILTY ABOUT YOUR DRINKING: NO
EVER HAD A DRINK FIRST THING IN THE MORNING TO STEADY YOUR NERVES TO GET RID OF A HANGOVER: NO
HAVE PEOPLE ANNOYED YOU BY CRITICIZING YOUR DRINKING: NO

## 2021-12-23 ASSESSMENT — GAIT ASSESSMENTS: GAIT LEVEL OF ASSIST: UNABLE TO PARTICIPATE

## 2021-12-23 ASSESSMENT — FIBROSIS 4 INDEX: FIB4 SCORE: 3.02

## 2021-12-23 NOTE — CARE PLAN
The patient is Watcher - Medium risk of patient condition declining or worsening    Shift Goals  Clinical Goals: monitor hgb, maintain skin integrity, increase mobility  Patient Goals: comfort, rest  Family Goals: ELOINA. No family present.     Progress made toward(s) clinical / shift goals:       Problem: Knowledge Deficit - Standard  Goal: Patient and family/care givers will demonstrate understanding of plan of care, disease process/condition, diagnostic tests and medications  Outcome: Progressing     Problem: Skin Integrity  Goal: Skin integrity is maintained or improved  Outcome: Progressing     Problem: Psychosocial  Goal: Patient's level of anxiety will decrease  Outcome: Progressing     Problem: Mobility  Goal: Patient's capacity to carry out activities will improve  Outcome: Progressing       Patient is not progressing towards the following goals:n/a

## 2021-12-23 NOTE — DISCHARGE PLANNING
Anticipated Discharge Disposition: Home with hospice    Action: Case discussed with [facility] team, patient plans to go home on hospice.  Patient needs to be set up with a hospital bed.  KIRBY PORTILLO spoke with Suzette at [facility] ([phone]).  Suzette clarified that HH will need to be set up with [facility], and DME will be arranged on our end.  KIRBY PORTILLO called [facility], they confirmed that they supply hospital beds.  RN CM met with the patient at bedside to discuss HH and DME arrangements, Patient consented to sending referrals to [facility] and [facility].  Patient clarified that she does not have a current PCP, she has only been working with Dr. Granado with [facility].  Choice forms faxed to AR[facility]ray[facility].      Barriers to Discharge: HH/Hospice and DME arrangements     Plan: Case coordination to continue to follow up with medical team to discuss discharge barriers.

## 2021-12-23 NOTE — PROGRESS NOTES
Report received and patient care assumed. Pt is resting in bed, A&Ox4, with no complaints of pain, and is on RA. Tele box on. Monitor room notified, RR confirmed and box linked. All fall precautions are in place, belongings at bedside table.  Pt was updated on POC, no questions or concerns. Pt educated on use of call light for assistance.

## 2021-12-23 NOTE — PROGRESS NOTES
[facility] MEDICINE PROGRESS NOTE     Attending:   Cooper Martinez MD    Resident:   Gamaliel Ludwig DO    PATIENT: Michelle Diana; [id number]; 1965    ID: 56 y.o. female admitted for syncope and hematochezia with dropping hemoglobin    SUBJECTIVE: No acute events overnight, patient without any bloody bowel movements yet this morning    OBJECTIVE:     Vitals:    12/22/21 1912 12/22/21 1938 12/22/21 2354 12/23/21 0430   BP: 101/65 128/83 109/74 108/74   Pulse: 100 97 98 95   Resp: 18 15 16 17   Temp:  36.2 °C (97.1 °F) 36.8 °C (98.2 °F) 36.4 °C (97.6 °F)   TempSrc:  Temporal Temporal Temporal   SpO2: 92% 94% 91% 93%   Weight:       Height:         No intake or output data in the 24 hours ending 12/23/21 0719    PE:  General: No acute distress, resting comfortably in bed.  HEENT: Normocephalic, atraumatic. EOMI.  Cardiovascular: RRR with no M/R/G.  Respiratory: Symmetrical chest. Clear to auscultation bilaterally with no wheezes, rales or rhonchi  Abdomen: soft, non-tender, non-distended no masses, +BS   EXT:  Moves all extremities. No edema or cyanosis. 2+ pulses in all extremities. LLE with left calf larger than right.   Neuro: non focal, sensation grossly intact    LABS:  Recent Labs     12/22/21  1316 12/22/21  1316 12/22/21  1640 12/22/21 2026 12/23/21  0409   WBC 2.9*  --   --   --  2.3*   RBC 2.88*  --   --   --  3.02*   HEMOGLOBIN 10.1*   < > 10.2* 10.8* 10.9*   HEMATOCRIT 30.4*   < > 30.3* 31.2* 30.8*   .6*  --   --   --  102.0*   MCH 35.1*  --   --   --  36.1*   RDW 57.2*  --   --   --  56.2*   PLATELETCT 56*  --   --   --  63*   MPV 10.9  --   --   --  11.0   NEUTSPOLYS 80.90*  --   --   --  56.50   LYMPHOCYTES 16.10*  --   --   --  36.50   MONOCYTES 1.00  --   --   --  0.00   EOSINOPHILS 0.00  --   --   --  0.00   BASOPHILS 1.00  --   --   --  0.00   RBCMORPHOLO  --   --   --   --  Present    < > = values in this interval not displayed.     Recent Labs     12/22/21  1316   SODIUM 135    POTASSIUM 3.9   CHLORIDE 104   CO2 20   BUN 21   CREATININE 0.43*   CALCIUM 6.9*   ALBUMIN 3.0*     Estimated GFR/CRCL = Estimated Creatinine Clearance: 142.3 mL/min (A) (by C-G formula based on SCr of 0.43 mg/dL (L)).  Recent Labs     12/22/21  1316   GLUCOSE 252*     Recent Labs     12/22/21  1316   ASTSGOT 43   ALTSGPT 172*   TBILIRUBIN 1.0   ALKPHOSPHAT 65   GLOBULIN 1.3*   INR 1.63*             Recent Labs     12/22/21  1316   INR 1.63*   APTT <20.0*       MICROBIOLOGY: none    IMAGING:   DX-CHEST-PORTABLE (1 VIEW)   Final Result      Mild left basilar opacity probably representing atelectasis however correlate clinically for infection. Cannot exclude a trace left pleural effusion.          MEDS:  Current Facility-Administered Medications   Medication Last Admin   • senna-docusate (PERICOLACE or SENOKOT S) 8.6-50 MG per tablet 2 Tablet      And   • polyethylene glycol/lytes (MIRALAX) PACKET 1 Packet      And   • magnesium hydroxide (MILK OF MAGNESIA) suspension 30 mL      And   • bisacodyl (DULCOLAX) suppository 10 mg     • acetaminophen (Tylenol) tablet 650 mg     • labetalol (NORMODYNE/TRANDATE) injection 10 mg     • guaiFENesin dextromethorphan (ROBITUSSIN DM) 100-10 MG/5ML syrup 10 mL     • calcium carbonate (TUMS) chewable tab 500 mg 500 mg at 12/23/21 0559   • dexamethasone (DECADRON) tablet 4 mg 4 mg at 12/22/21 1825   • omeprazole (PRILOSEC) capsule 20 mg 20 mg at 12/23/21 0557         ASSESSMENT/PLAN:   56 yoF with PMH metastatic breast cancer admitted for syncope and hematochezia in the setting of dropping hemoglobin.     # hematochezia  Hemoglobin remained stable overnight. GI evaluated patient and recommended conservative management. Patient notes no further bloody stools today.   - continue to hold Eliquis  - continue PPI  - given patient's desire to pursue Hospice care, will hold off on IVC filter     # syncope  Telemetry with SR overnight.     # metastatic breast cancer  Patient wanting to stop  her oral chemotherapy. She is wanting to pursue Hospice care.   - Hospice/ Palliative consult placed  - continue holding oral chemotherapy  - contact her Oncology office today  - continue home steroids for now    # leukopenia  # thrombocytopenia  Likely secondary to chemotherapy    # hypocalcemia  Continue home Ca2+ supplementation    # LLE DVT  Continue to hold Eliquis.       Core Measures:  DVT PPX: holding eliquis in setting of GI bleed  ABX: none  Lines: PIV  Fluids: none  PCP: none in chart  CODE STATUS: DNAR/ DNI    Dispo: inpatient for syncope

## 2021-12-23 NOTE — PROGRESS NOTES
Report received from Alyssa Pepper. Assumed pt care. Pt is resting in bed, no complaints of pain. Pt A&O x 4. Fall precautions in place, call light and belongings within reach, bed in lowest position. No signs of distress.

## 2021-12-23 NOTE — FACE TO FACE
Face to Face Note  -  Durable Medical Equipment    Gamaliel Ludwig D.O. - NPI: [id number]  I certify that this patient is under my care and that they had a durable medical equipment(DME)face to face encounter by myself that meets the physician DME face-to-face encounter requirements with this patient on:    Date of encounter:   Patient:                    MRN:                       YOB: 2023  Michelle Diana  [id number]  1965     The encounter with the patient was in whole, or in part, for the following medical condition, which is the primary reason for durable medical equipment:  Other - metastatic cancer    I certify that, based on my findings, the following durable medical equipment is medically necessary:  Beds. Semi-electric bed.    HOME O2 Saturation Measurements:(Values must be present for Home Oxygen orders)         ,     ,         My Clinical findings support the need for the above equipment due to:  Frequent Falls, Other - weakness    Supporting Symptoms: fell twice due to weakness prior to admission    If patient feels more short of breath, they can go up to 6 liters per minute and contact healthcare provider.

## 2021-12-23 NOTE — CONSULTS
Gastroenterology Consult Note     Date of Consult: 12/22/2021    Requesting Physician: ED     Reason for consult: Hematochezia     History of Present Illness:   This is a pleasant 56-year-old female with metastatic breast cancer to the brain and bone (on chemotherapy) who presented with painless hematochezia and multiple syncopal episodes.  Patient passed bright red blood per rectum earlier this morning.  She noted that she has been having constipation for the past several days and after she evacuated her bowel, she had rectal bleed.  Patient is on Eliquis for a recently diagnosed left lower extremity DVT 2 weeks ago.  Last Eliquis use was yesterday, 12/21/2021.  She does not take NSAIDs or any other antiantibiotics.    Patient had a colonoscopy in 2017 by  which revealed diverticulosis in the sigmoid, descending and ascending colon.  Otherwise, unremarkable finding.  Patient has a family history of colon cancer and had her first colonoscopy in 2017.    In the ED, patient had 2 bowel movements which consisted of mostly light brown stool and some bright red blood on the chucks. Vitals are stable.  Hemoglobin is 10.2 which is unchanged from previous labs this year.     Past Medical History:  Past Medical History:   Diagnosis Date   • Cancer (HCC)     Metastatic breast cancer dx 2016   • Chemotherapy adverse reaction     on oral abemicicilb and faslodex   • Heart burn    • High cholesterol    • History of radiation therapy     2016 Left occipital and 2017 left breast  [facility]        Past Surgical History:   Past Surgical History:   Procedure Laterality Date   • CRANIOTOMY STEALTH Right 3/3/2021    Procedure: CRANIOTOMY, USING FRAMELESS STEREOTAXY-FOR TUMOR;  Surgeon: German Snowden M.D.;  Location: SURGERY [facility];  Service: Neurosurgery   • LYMPH NODE EXCISION Left 6/26/2018    Procedure: LYMPH NODE EXCISION - CERVICAL NODE BX;  Surgeon: Nataliia JAMES  MARY Acevedo;  Location: SURGERY [facility];  Service: General   • OTHER  2017    lymph node from neck removed   • OTHER  2016    partial mastectomy   • LUMPECTOMY Left     12/2016        Allergies  Patient has no known allergies.     Social History:   Social History     Socioeconomic History   • Marital status:      Spouse name: Not on file   • Number of children: Not on file   • Years of education: Not on file   • Highest education level: Not on file   Occupational History   • Not on file   Tobacco Use   • Smoking status: Never Smoker   • Smokeless tobacco: Never Used   Vaping Use   • Vaping Use: Never used   Substance and Sexual Activity   • Alcohol use: No   • Drug use: No   • Sexual activity: Not on file   Other Topics Concern   • Not on file   Social History Narrative   • Not on file     Social Determinants of Health     Financial Resource Strain:    • Difficulty of Paying Living Expenses: Not on file   Food Insecurity:    • Worried About Running Out of Food in the Last Year: Not on file   • Ran Out of Food in the Last Year: Not on file   Transportation Needs:    • Lack of Transportation (Medical): Not on file   • Lack of Transportation (Non-Medical): Not on file   Physical Activity:    • Days of Exercise per Week: Not on file   • Minutes of Exercise per Session: Not on file   Stress:    • Feeling of Stress : Not on file   Social Connections:    • Frequency of Communication with Friends and Family: Not on file   • Frequency of Social Gatherings with Friends and Family: Not on file   • Attends Worship Services: Not on file   • Active Member of Clubs or Organizations: Not on file   • Attends Club or Organization Meetings: Not on file   • Marital Status: Not on file   Intimate Partner Violence:    • Fear of Current or Ex-Partner: Not on file   • Emotionally Abused: Not on file   • Physically Abused: Not on file   • Sexually Abused: Not on file   Housing Stability:    • Unable to Pay for Housing in  the Last Year: Not on file   • Number of Places Lived in the Last Year: Not on file   • Unstable Housing in the Last Year: Not on file        Family History:   Family History   Problem Relation Age of Onset   • Cancer Father         Prostate cancer/colon cancer       Home Medications:  Home Medications    Medication Sig Taking? Last Dose Authorizing Provider   apixaban (ELIQUIS) 5mg Tab Take 1 Tablet by mouth 2 times a day.  12/21/2021 at 2000 Michael J Bloch, M.D.   dexamethasone (DECADRON) 4 MG Tab Take 4 mg by mouth 2 times a day.  12/21/2021 at 1900 Physician Outpatient   capecitabine (XELODA) 500 MG tablet Take 1,500 mg by mouth 2 times a day. Take with 300 mg (two 150 mg) for a total of 1800 mg  12/21/2021 at 2030 Physician Outpatient   capecitabine (XELODA) 150 MG tablet Take 300 mg by mouth 2 times a day. Take with 1500 mg (three 500 mg) for a total of 1800 mg  12/21/2021 at 2030 Physician Outpatient   raNITIdine HCl (ZANTAC PO) Take 1 Tablet by mouth every day.  12/21/2021 at 1700 Physician Outpatient   denosumab (XGEVA) 120 MG/1.7ML injection Inject 120 mg under the skin every 3 months.  >3 Months at [facility]   CALCIUM PO Take 600 mg by mouth 2 times a day.  >2 weeks at [facility]         Review of Systems:  General: No fevers, chills, unintentional, weight loss.  HEENT: No scleral icterus, gum bleed, dysphagia, odynophagia.  Cardiology: No chest pain, palpitations, orthopnea.  Respiratory: No dyspnea, cough, wheezing.  Gastrointestinal: No abdominal pain, nausea, vomiting, changes in bowel habits. (+) rectal bleed.  Genitourinary: No hematuria, dysuria, urgency.  Neurologic: No changes in memory, confusion, gait instability.  Skin: No ecchymosis, jaundice, telangiectasia.    Physical Exam:  Vitals:    12/22/21 1720 12/22/21 1735 12/22/21 1805 12/22/21 1835   BP: 128/75 110/64 121/80 126/76   Pulse: 78 83 95 (!) 102   Resp: 17 16 16 16   Temp:       TempSrc:       SpO2: 94%  91% 94% 95%   Weight:       Height:         General: No acute cardiopulmonary distress.  Head: Normocephalic.  EENT: Scleral anicterus. Mucosa moist. Moon facies.  Respiratory: Breath sounds present. Symmetrical rise of anterior thorax.  Cardiovascular: Normal S1, S2.  Gastrointestinal: Soft, nontender, nondistended. Normoactive bowel sounds. No guarding.  Neurologic: Alert and oriented.  Skin: Warm and dry.      LABS:  Lab Results   Component Value Date/Time    SODIUM 135 12/22/2021 01:16 PM    POTASSIUM 3.9 12/22/2021 01:16 PM    CHLORIDE 104 12/22/2021 01:16 PM    CO2 20 12/22/2021 01:16 PM    GLUCOSE 252 (H) 12/22/2021 01:16 PM    BUN 21 12/22/2021 01:16 PM    CREATININE 0.43 (L) 12/22/2021 01:16 PM      Lab Results   Component Value Date/Time    WBC 2.9 (L) 12/22/2021 01:16 PM    RBC 2.88 (L) 12/22/2021 01:16 PM    HEMOGLOBIN 10.2 (L) 12/22/2021 04:40 PM    HEMATOCRIT 30.3 (L) 12/22/2021 04:40 PM    .6 (H) 12/22/2021 01:16 PM    MCH 35.1 (H) 12/22/2021 01:16 PM    MCHC 33.2 (L) 12/22/2021 01:16 PM    MPV 10.9 12/22/2021 01:16 PM    NEUTSPOLYS 80.90 (H) 12/22/2021 01:16 PM    LYMPHOCYTES 16.10 (L) 12/22/2021 01:16 PM    MONOCYTES 1.00 12/22/2021 01:16 PM    EOSINOPHILS 0.00 12/22/2021 01:16 PM    BASOPHILS 1.00 12/22/2021 01:16 PM    ANISOCYTOSIS 1+ 11/16/2020 10:53 AM        Lab Results   Component Value Date/Time    PROTHROMBTM 18.9 (H) 12/22/2021 01:16 PM    INR 1.63 (H) 12/22/2021 01:16 PM      Recent Labs     12/22/21  1316   ASTSGOT 43   ALTSGPT 172*   TBILIRUBIN 1.0   GLOBULIN 1.3*   INR 1.63*       IMAGING: Reviewed personally and summarized in HPI.    Principal Problem:    Syncope and collapse POA: Yes  Resolved Problems:    * No resolved hospital problems. *          ASSESSMENT:   1.  Lower GI bleed with hematochezia  2.  Macrocytic anemia  3.  Metastatic breast cancer  4.  Left lower extremity DVT on Eliquis at home  5.  Chronic steroid use       PLAN:   This is a 56-year-old female who  presented with painless hematochezia x1 day.  Patient had constipation for several days and later developed hematochezia.  She is on Eliquis for a lower extremity DVT and last dose was on 12/21/2021.  Possible causes for her GI bleed include solitary rectal ulcer syndrome, proctitis/colitis, hemorrhoids, diverticulosis versus malignancy.  She is on steroids which increase risk of GI bleed. Patient's vitals as well as hemoglobin remain stable.  Continue to hold Eliquis.  Consider an IVC filter if patient is not a candidate for anticoagulation.    Conservative treatment.  Monitor H&H.  If patient continues to have rectal bleed and/or hemodynamic instability, will consider a colonoscopy.  Last colonoscopy was 4 years ago which revealed diverticulosis.       Thank you for the consultation. Please call with any questions or concerns.    Gaye Hobbs D.O.  Gastroenterology  [facility]  [phone]

## 2021-12-23 NOTE — PROGRESS NOTES
12 hour chart check    Have a great shift!   Detail Level: Zone Continue Regimen: Triamcinolone cream as needed for itching/irritation Plan: Gentle cleansers

## 2021-12-23 NOTE — PROGRESS NOTES
Gastroenterology Progress Note     Date of Consult: 12/23/2021    Requesting Physician: Dr. Segura     Chief Complaint: Transfer for GI bleed    Consult: Hematochezia     History of Present Illness:   -Patient was being treated for acute DVT provoked by breast cancer with brain mets, the former was dx a few weeks ago and presents after a small amount of painless gross bleeding in setting of syncope and stopped eliquis.  -No bleeding here, Hgb baseline at 19.9    -Patient feels well without complaint, going to talk to hospice later as she is considering stopping chemotherapy and moving to palliative/hospice route     Past Medical History:  Past Medical History:   Diagnosis Date   • Cancer (HCC)     Metastatic breast cancer dx 2016   • Chemotherapy adverse reaction     on oral abemicicilb and faslodex   • Heart burn    • High cholesterol    • History of radiation therapy     2016 Left occipital and 2017 left breast  [facility]        Past Surgical History:   Past Surgical History:   Procedure Laterality Date   • CRANIOTOMY STEALTH Right 3/3/2021    Procedure: CRANIOTOMY, USING FRAMELESS STEREOTAXY-FOR TUMOR;  Surgeon: German Snowden M.D.;  Location: SURGERY [facility];  Service: Neurosurgery   • LYMPH NODE EXCISION Left 6/26/2018    Procedure: LYMPH NODE EXCISION - CERVICAL NODE BX;  Surgeon: Nataliia Acevedo M.D.;  Location: [facility];  Service: General   • OTHER  2017    lymph node from neck removed   • OTHER  2016    partial mastectomy   • LUMPECTOMY Left     12/2016        Allergies  Patient has no known allergies.     Social History:   Social History     Socioeconomic History   • Marital status:      Spouse name: Not on file   • Number of children: Not on file   • Years of education: Not on file   • Highest education level: Not on file   Occupational History   • Not on file   Tobacco Use   • Smoking status: Never Smoker   • Smokeless tobacco:  Never Used   Vaping Use   • Vaping Use: Never used   Substance and Sexual Activity   • Alcohol use: No   • Drug use: No   • Sexual activity: Not on file   Other Topics Concern   • Not on file   Social History Narrative   • Not on file     Social Determinants of Health     Financial Resource Strain:    • Difficulty of Paying Living Expenses: Not on file   Food Insecurity:    • Worried About Running Out of Food in the Last Year: Not on file   • Ran Out of Food in the Last Year: Not on file   Transportation Needs:    • Lack of Transportation (Medical): Not on file   • Lack of Transportation (Non-Medical): Not on file   Physical Activity:    • Days of Exercise per Week: Not on file   • Minutes of Exercise per Session: Not on file   Stress:    • Feeling of Stress : Not on file   Social Connections:    • Frequency of Communication with Friends and Family: Not on file   • Frequency of Social Gatherings with Friends and Family: Not on file   • Attends Yazidism Services: Not on file   • Active Member of Clubs or Organizations: Not on file   • Attends Club or Organization Meetings: Not on file   • Marital Status: Not on file   Intimate Partner Violence:    • Fear of Current or Ex-Partner: Not on file   • Emotionally Abused: Not on file   • Physically Abused: Not on file   • Sexually Abused: Not on file   Housing Stability:    • Unable to Pay for Housing in the Last Year: Not on file   • Number of Places Lived in the Last Year: Not on file   • Unstable Housing in the Last Year: Not on file        Family History:   Family History   Problem Relation Age of Onset   • Cancer Father         Prostate cancer/colon cancer       Home Medications:  Home Medications    Medication Sig Taking? Last Dose Authorizing Provider   apixaban (ELIQUIS) 5mg Tab Take 1 Tablet by mouth 2 times a day.  12/21/2021 at 2000 Michael J Bloch, M.D.   dexamethasone (DECADRON) 4 MG Tab Take 4 mg by mouth 2 times a day.  12/21/2021 at 1900 Physician  Outpatient   capecitabine (XELODA) 500 MG tablet Take 1,500 mg by mouth 2 times a day. Take with 300 mg (two 150 mg) for a total of 1800 mg  12/21/2021 at 2030 Physician Outpatient   capecitabine (XELODA) 150 MG tablet Take 300 mg by mouth 2 times a day. Take with 1500 mg (three 500 mg) for a total of 1800 mg  12/21/2021 at 2030 Physician Outpatient   raNITIdine HCl (ZANTAC PO) Take 1 Tablet by mouth every day.  12/21/2021 at 1700 Physician Outpatient   denosumab (XGEVA) 120 MG/1.7ML injection Inject 120 mg under the skin every 3 months.  >3 Months at [facility]   CALCIUM PO Take 600 mg by mouth 2 times a day.  >2 weeks at [facility]         Review of Systems:  General: No fevers, chills, unintentional, weight loss.  HEENT: No scleral icterus, gum bleed, dysphagia, odynophagia.  Cardiology: No chest pain, palpitations, orthopnea.  Respiratory: No dyspnea, cough, wheezing.  Gastrointestinal: No abdominal pain, nausea, vomiting, changes in bowel habits. (+) rectal bleed.  Genitourinary: No hematuria, dysuria, urgency.  Neurologic: No changes in memory, confusion, gait instability.  Skin: No ecchymosis, jaundice, telangiectasia.    Physical Exam:  Vitals:    12/22/21 1938 12/22/21 2354 12/23/21 0430 12/23/21 0805   BP: 128/83 109/74 108/74 120/81   Pulse: 97 98 95 (!) 105   Resp: 15 16 17 18   Temp: 36.2 °C (97.1 °F) 36.8 °C (98.2 °F) 36.4 °C (97.6 °F) 36.2 °C (97.1 °F)   TempSrc: Temporal Temporal Temporal Temporal   SpO2: 94% 91% 93% 95%   Weight:       Height:         Physical Exam  Constitutional:       Appearance: She is obese.      Comments: Appears chronically ill with skin bruising/bleeding diffusely but not acutely toxic   HENT:      Mouth/Throat:      Mouth: Mucous membranes are moist.      Pharynx: Oropharynx is clear. No oropharyngeal exudate or posterior oropharyngeal erythema.   Cardiovascular:      Rate and Rhythm: Regular rhythm. Tachycardia present.      Pulses: Normal  pulses.      Heart sounds: Normal heart sounds. No murmur heard.      Pulmonary:      Effort: Pulmonary effort is normal. No respiratory distress.      Breath sounds: Normal breath sounds. No wheezing.   Abdominal:      General: Abdomen is flat. Bowel sounds are normal. There is no distension.      Palpations: Abdomen is soft.      Tenderness: There is no abdominal tenderness.   Musculoskeletal:         General: No swelling. Normal range of motion.   Skin:     Findings: Bruising present.   Neurological:      General: No focal deficit present.      Mental Status: She is alert and oriented to person, place, and time. Mental status is at baseline.   Psychiatric:         Mood and Affect: Mood normal.         Behavior: Behavior normal.           LABS:  Lab Results   Component Value Date/Time    SODIUM 135 12/22/2021 01:16 PM    POTASSIUM 3.9 12/22/2021 01:16 PM    CHLORIDE 104 12/22/2021 01:16 PM    CO2 20 12/22/2021 01:16 PM    GLUCOSE 252 (H) 12/22/2021 01:16 PM    BUN 21 12/22/2021 01:16 PM    CREATININE 0.43 (L) 12/22/2021 01:16 PM      Lab Results   Component Value Date/Time    WBC 2.3 (LL) 12/23/2021 04:09 AM    RBC 3.02 (L) 12/23/2021 04:09 AM    HEMOGLOBIN 10.9 (L) 12/23/2021 04:09 AM    HEMATOCRIT 30.8 (L) 12/23/2021 04:09 AM    .0 (H) 12/23/2021 04:09 AM    MCH 36.1 (H) 12/23/2021 04:09 AM    MCHC 35.4 (H) 12/23/2021 04:09 AM    MPV 11.0 12/23/2021 04:09 AM    NEUTSPOLYS 56.50 12/23/2021 04:09 AM    LYMPHOCYTES 36.50 12/23/2021 04:09 AM    MONOCYTES 0.00 12/23/2021 04:09 AM    EOSINOPHILS 0.00 12/23/2021 04:09 AM    BASOPHILS 0.00 12/23/2021 04:09 AM    ANISOCYTOSIS 2+ (A) 12/23/2021 04:09 AM        Lab Results   Component Value Date/Time    PROTHROMBTM 18.9 (H) 12/22/2021 01:16 PM    INR 1.63 (H) 12/22/2021 01:16 PM      Recent Labs     12/22/21  1316   ASTSGOT 43   ALTSGPT 172*   TBILIRUBIN 1.0   GLOBULIN 1.3*   INR 1.63*       IMAGING: Reviewed personally and summarized in HPI.    Principal Problem:     Syncope and collapse POA: Yes  Resolved Problems:    * No resolved hospital problems. *       ASSESSMENT:   #Lower GI bleed with hematochezia  #Macrocytic anemia  -Patient has painless hematochezia at baseline hemoglobin after starting anticoagulation with hx chronic steroids. Unclear etiology, diverticular bleed likely  #Metastatic breast cancer, terminal  #Acute Left lower extremity DVT on Eliquis at home     #Chronic steroid use  -Patient has poor prognosis and considering hospice     PLAN:   -Stop Eliquis  -No role for EGD/colonoscopy without active bleed, life limiting prognosis no followup indicated  -Agree with hospice, if she does go then no anticoagulation, asymptomatic and not in line with goals fo care  -If she changes her mind and does not go hospice, then can discuss IVC filter vs restart eliquis as indicated    -GI to sign off, if patient has return of bleed please re-consult    Shlomo Ramsey D.O.

## 2021-12-23 NOTE — THERAPY
"Physical Therapy   Initial Evaluation     Patient Name: Michelle Diana  Age:  56 y.o., Sex:  female  Medical Record #: [id number]  Today's Date: 12/23/2021     Precautions  Precautions: Fall Risk    Assessment  Patient is 56 y.o. female who presented acutely with syncope and GI bleed.  Patient has had several falls recently.  Patient had 2 episodes of syncope the night before admit, 1st episode she fell and hit her head, 2nd episode her son was able to assist her to the ground.  PMH includes metastatic breast cancer with mets to the brain s/p R frontal lobe craniotomy in March 2021.  Today patient required min-mod A for supine <> sit.  Patient c/o dizziness sitting at EOB and after ~5 min returned to supine.  After supine rest patient was able to stand with max A and FWW.  Attempted lateral steps toward HOB however patient stated \"I can't, I'm too weak\" and returned to sitting.  Patient would benefit from continued acute PT and post acute placement to address impairments and maximize safety with mobility.    Plan    Recommend Physical Therapy 3 times per week until therapy goals are met for the following treatments:  Bed Mobility, Equipment, Gait Training, Neuro Re-Education / Balance, Self Care/Home Evaluation, Stair Training, Therapeutic Activities and Therapeutic Exercises    DC Equipment Recommendations: Unable to determine at this time  Discharge Recommendations: Recommend post-acute placement for additional physical therapy services prior to discharge home       Objective     12/23/21 0952   Prior Living Situation   Prior Services Intermittent Physical Support for ADL Per Family   Housing / Facility 2 Story House (main floor + basement)   Steps Into Home 2   Steps In Home (FOS to basement)   Bathroom Set up Walk In Shower   Equipment Owned 4-Wheel Walker (Adjustable bed, no rails)   Lives with - Patient's Self Care Capacity Spouse   Comments Spouse and children present & supportive during evaluation " "  Prior Level of Functional Mobility   Bed Mobility Required Assist   Transfer Status Required Assist   Ambulation Required Assist   Distance Ambulation (Feet) (household)   Assistive Devices Used 4-Wheel Walker   Stairs Required Assist   Comments Pt reported requiring increased assist due to weakness   History of Falls   History of Falls Yes   Cognition    Cognition / Consciousness X   New Learning Impaired   Attention Impaired   Initiation Impaired   Comments Pleasant & cooperative, perseverative on dizziness and frequently stated \"I can't\".  Per family pt very fearful of falls even when in safe position lying in bed   Active ROM Lower Body    Active ROM Lower Body  WDL   Strength Lower Body   Comments Unable to assess fully due to pt limiting upright activity however no knee buckling in standing. 5/5 knee extension in supine   Sensation Lower Body   Lower Extremity Sensation   WDL   Comments Denied numbness/tingling   Balance Assessment   Sitting Balance (Static) Fair   Sitting Balance (Dynamic) Fair -   Standing Balance (Static) Fair -   Standing Balance (Dynamic) Poor   Weight Shift Sitting Poor   Weight Shift Standing Poor   Comments w/ FWW   Gait Analysis   Gait Level Of Assist Unable to Participate   Comments Attempted side steps at EOB however pt stated \"I can't I'm too weak\" and sat down on EOB   Bed Mobility    Supine to Sit Moderate Assist   Sit to Supine Minimal Assist   Scooting Minimal Assist (seated EOB)   Rolling Supervised   Comments HOB elevated, use of rails   Functional Mobility   Sit to Stand Maximal Assist   Bed, Chair, Wheelchair Transfer Unable to Participate   Transfer Method Stand Step   Mobility EOB, STS x 1   Comments Pt c/o dizziness seated EOB and standing   Activity Tolerance   Sitting in Chair NT   Sitting Edge of Bed 5 min   Standing <2 min   Comments limited by dizziness   Short Term Goals    Short Term Goal # 1 Pt will perform supine <> sit with SPV within 6 visits in order to " progress toward PLOF   Short Term Goal # 2 Pt will perform STS/functional transfers with FWW and min A within 6 visits in order to progress OOB activity   Short Term Goal # 3 Pt will ambulate 15 ft with FWW within 6 visits in order to progress toward PLOF   Short Term Goal # 4 Pt will negotiate 2 steps with min A within 6 visits in order to access home   Session Information   Date / Session Number  12/23 - 1 (1/3, 12/29)

## 2021-12-23 NOTE — THERAPY
Occupational Therapy   Initial Evaluation     Patient Name: Michelle Diana  Age:  56 y.o., Sex:  female  Medical Record #: [id number]  Today's Date: 12/23/2021     Precautions  Precautions: (P) Fall Risk    Assessment  Patient is 56 y.o. female admitted due to syncope and GI bleed with increased falls at home. Patient with recent fall in which she fell and hit her head. PMH: Metastatic breast CA with mets to brain s/p R frontal lobe craniotomy in March 2021. Patient presents with max A for LB dressing. Min/mod A for bed mobility. Patient with sit to stand transfer from bed level with HHA. Patient with dizziness with standing. Patient would benefit from OT to increase independence in ADL and functional mobility.      Plan    Recommend Occupational Therapy 3 times per week until therapy goals are met for the following treatments:  Self Care/Activities of Daily Living.    DC Equipment Recommendations: (P)  (Continue to assess for D/C needs)  Discharge Recommendations: (P) Recommend post-acute placement for additional occupational therapy services prior to discharge home     Subjective    Patient alert during session. Patients family present during session. Nurse richard occupational therapist to work with patient.       Objective       12/23/21 0955   Total Time Spent   Total Time Spent (Mins) 38   Charge Group   OT Evaluation OT Evaluation Mod   OT Self Care / ADL 1   Initial Contact Note    Initial Contact Note Order Received and Verified, Occupational Therapy Evaluation in Progress with Full Report to Follow.   Prior Living Situation   Prior Services Intermittent Physical Support for ADL Per Family   Housing / Facility 2 Story House   Steps Into Home 2   Steps In Home   (Flight of steps to basement )   Bathroom Set up Walk In Shower   Equipment Owned 4-Wheel Walker  (Adjustable bed )   Lives with - Patient's Self Care Capacity Spouse   Comments Patient has supportive spouse and children    Prior Level of ADL  Function   Self Feeding Independent   Bathing Requires Assist  (spouse )   Dressing Requires Assist  (spouse )   Prior Level of IADL Function   Prior Level Of Mobility Independent With Device in Community   History of Falls   History of Falls Yes   Precautions   Precautions Fall Risk   Vitals   Vitals Comments BP sitting /74, after standing patient dizzy BP 96/61   Pain   Pain Scales 0 to 10 Scale    Pain 0 - 10 Group   Therapist Pain Assessment Post Activity;0   Cognition    New Learning Impaired   Attention Impaired   Initiation Impaired   Comments Pleasant and cooperative    Passive ROM Upper Body   Passive ROM Upper Body WDL   Active ROM Upper Body   Active ROM Upper Body  WDL   Strength Upper Body   Upper Body Strength    (>/=3+/5 BUE grossly assessed during ADL task. Patient with w)   Sensation Upper Body   Upper Extremity Sensation  WDL   Comments BUE    Balance Assessment   Sitting Balance (Static) Fair   Sitting Balance (Dynamic) Fair -   Standing Balance (Static) Fair -   Standing Balance (Dynamic) Poor   Weight Shift Sitting Poor   Weight Shift Standing Poor   Comments   (FWW)   Bed Mobility    Supine to Sit Moderate Assist  (Head of bed elevated )   Sit to Supine Minimal Assist   Scooting Minimal Assist   Rolling Supervised   Comments Utilized bed rails and HOB slightl elevated    ADL Assessment   Grooming Supervision  (washing face sitting EOB )   Upper Body Dressing Moderate Assist  (donning gown )   Lower Body Dressing Maximal Assist  (Donning socks)   How much help from another person does the patient currently need...   Putting on and taking off regular lower body clothing? 2   Bathing (including washing, rinsing, and drying)? 2   Toileting, which includes using a toilet, bedpan, or urinal? 2   Putting on and taking off regular upper body clothing? 2   Taking care of personal grooming such as brushing teeth? 3   Eating meals? 3   6 Clicks Daily Activity Score 14   Functional Mobility   Sit to  Stand Maximal Assist  (HHA )   Transfer Method Stand Step   Mobility sit to stand x1 at bed side   Activity Tolerance   Sitting Edge of Bed ~5 minutes    Standing ~2 minutes    Comments Dizziness standing bed side    Patient / Family Goals   Patient / Family Goal #1 Increase independence in ADL and functional mobility    Short Term Goals   Short Term Goal # 1 Patient will perform UB/LB dressing with setup, AE as PRN    Short Term Goal # 2 Patient will perform standing grooming with setup, Supervision for standing balance    Short Term Goal # 3 Patient will perform toileting with setup   Education Group   Role of Occupational Therapist Patient Response Patient;Acceptance;Explanation;Verbal Demonstration   Additional Comments Safety with functional mobility    Problem List   Problem List Decreased Active Daily Living Skills;Decreased Functional Mobility;Decreased Activity Tolerance   Anticipated Discharge Equipment and Recommendations   DC Equipment Recommendations   (Continue to assess for D/C needs)   Discharge Recommendations Recommend post-acute placement for additional occupational therapy services prior to discharge home   Interdisciplinary Plan of Care Collaboration   IDT Collaboration with  Nursing;Family / Caregiver   Patient Position at End of Therapy In Bed;Bed Alarm On;Phone within Reach;Tray Table within Reach   Collaboration Comments JAYA treviño on functional mobility and ADL status    Session Information   Date / Session Number  12/23 #1 (1/3x 12/29)   Priority 3

## 2021-12-23 NOTE — ED NOTES
Blood sent to lab  Pt had another bowel movement, yellow in color, loose. No blood noted at this time.   GI doctor at bedside

## 2021-12-23 NOTE — DISCHARGE PLANNING
Anticipated Discharge Disposition: Home with hospice services    Action: LSW met with patient and her family, spouse Shawn, adult children Asmita and Olesya Diana at bedside to complete assessment and to discuss discharge plan. Patient resides with spouse Shawn in [place], [place]. LSW verified demographics with the patient and discussed hospice care services with the family. Patient and her family have been in contact with hospice services in [facility] which is a volunteer program. Patient asked LSW if she could just leave for good, she was done and did not want to keep going on. There are no programs to assist with end of life other than hospice services to help with comfort care. They also needs a prescription for a medical bed so patient can be cared for while home.           Patient does not have a PCP and has been following with her oncologist, she uses the [facility] in [place]. The family verbalizes they are not ready for patient to come home yet as they need a medical bed and supplies to care for her.     LSW spoke to patients  Shawn privately, he states she has been battling with cancer for 5 years now and was doing well but has recently declined. Patient has put in a good fight but she is so tired and does not want to keep going on, the family has accepted this and supportive of patients wishes. Shawn states Suzette is the person at [facility] they have been in contact with and her contact number is [phone].     Barriers to Discharge: Hospice care care and  services set up and delivery of a medical bed     Plan: Patient is anticipated discharge home with hospice services. Case management to follow up with discharge needs

## 2021-12-23 NOTE — FACE TO FACE
Face to Face Supporting Documentation - Home Health    The encounter with this patient was in whole or in part the primary reason for home health admission.    Date of encounter:   Patient:                    MRN:                       YOB: 2023  Michelle Diaan  [id number]  1965     Home health to see patient for:  Home health aide and Comment: assistance with hospice care    Skilled need for:  Recent Deterioration of Health Status hospice care secondary to metastatic cancer    Skilled nursing interventions to include:  Comment: assistance with hospice care    Homebound status evidenced by:  Need the aid of supportive devices such as crutches, canes, wheelchairs or walkers or Needs the assistance of another person in order to leave the home. Leaving home requires a considerable and taxing effort. There is a normal inability to leave the home.    Community Physician to provide follow up care: Pcp Pt States None     Optional Interventions? No      I certify the face to face encounter for this home health care referral meets the CMS requirements and the encounter/clinical assessment with the patient was, in whole, or in part, for the medical condition(s) listed above, which is the primary reason for home health care. Based on my clinical findings: the service(s) are medically necessary, support the need for home health care, and the homebound criteria are met.  I certify that this patient has had a face to face encounter by myself.  Gamaliel Ludwig D.O. - STEVENI: [phone]

## 2021-12-23 NOTE — PROGRESS NOTES
4 Eyes Skin Assessment Completed by KIRBY Pepper and KIRBY Clarke.    Head Scab, Bruising, Redness and Scar. Bilateral lesions / abrasions to cheeks.  Ears WDL  Nose WDL  Mouth WDL  Neck WDL  Breast/Chest Redness. Scratch / abrasion to upper right breast.  Shoulder Blades Redness and Blanching  Spine Redness and Blanching  (R) Arm/Elbow/Hand Redness, Blanching and Abrasion. Elbow Dry and flaky.  (L) Arm/Elbow/Hand Redness, Blanching, generalized abrasions.   Abdomen Rash. Generalized rash, purple and spotted / speckled.   Groin WDL  Scrotum/Coccyx/Buttocks Redness and Blanching. Minor abrasions x2.   (R) Leg Redness, Scab, Bruising and Abrasion  (L) Leg Redness, Scab, Bruising and Abrasion  (R) Heel/Foot/Toe WDL  (L) Heel/Foot/Toe WDL      Devices In Places Tele Box, Blood Pressure Cuff and Pulse Ox      Interventions In Place Pillows and Pressure Redistribution Mattress    Possible Skin Injury No    Pictures Uploaded Into Epic Yes  Wound Consult Placed N/A  RN Wound Prevention Protocol Ordered Yes

## 2021-12-23 NOTE — CARE PLAN
The patient is Watcher - Medium risk of patient condition declining or worsening    Shift Goals  Clinical Goals: Monitor and trend Hgb, monitor for sxs of bleeding, maintain skin integrity, increase mobility  Patient Goals: Comfort, rest, get warm  Family Goals: ELOINA. No family present.     Progress made toward(s) clinical / shift goals:      Problem: Knowledge Deficit - Standard  Goal: Patient and family/care givers will demonstrate understanding of plan of care, disease process/condition, diagnostic tests and medications  Outcome: Progressing     Problem: Skin Integrity  Goal: Skin integrity is maintained or improved  Outcome: Progressing     Problem: Hemodynamics  Goal: Patient's hemodynamics, fluid balance and neurologic status will be stable or improve  Outcome: Progressing     Problem: Risk for Bleeding  Goal: Patient will take measures to prevent bleeding and recognizes signs of bleeding that need to be reported immediately to a health care professional  Outcome: Progressing  Goal: Patient will not experience bleeding as evidenced by normal blood pressure, stable hematocrit and hemoglobin levels and desired ranges for coagulation profiles  Outcome: Progressing       Patient is not progressing towards the following goals: Mobility - pt unable to mobilize this shift d/t weakness.  PT/OT eval order placed.

## 2021-12-23 NOTE — DISCHARGE PLANNING
Received Choice form at [phone]  Agency/Facility Name: [facility]  Referral sent per Choice form @ [phone]  Agency/Facility Name: [facility]  Spoke To: catie  Outcome: Per catie they do accept the pt insurance but they only have semi electric beds. Per catie they can get the bed delivered today or tomorrow to the home.     Agency/Facility Name: [facility]   Outcome: Was unable to leave [facility] as mail box is full

## 2021-12-23 NOTE — DISCHARGE PLANNING
Voalte message sent to Dr. Ludwig requesting an updated order and face-to-face for a semi-electric hospital bed.

## 2021-12-24 VITALS
WEIGHT: 182.1 LBS | HEART RATE: 101 BPM | SYSTOLIC BLOOD PRESSURE: 112 MMHG | OXYGEN SATURATION: 91 % | TEMPERATURE: 98.2 F | BODY MASS INDEX: 30.34 KG/M2 | DIASTOLIC BLOOD PRESSURE: 76 MMHG | RESPIRATION RATE: 16 BRPM | HEIGHT: 65 IN

## 2021-12-24 PROCEDURE — 302146: Performed by: FAMILY MEDICINE

## 2021-12-24 PROCEDURE — 99238 HOSP IP/OBS DSCHRG MGMT 30/<: CPT | Mod: GC | Performed by: FAMILY MEDICINE

## 2021-12-24 PROCEDURE — 51798 US URINE CAPACITY MEASURE: CPT

## 2021-12-24 PROCEDURE — A9270 NON-COVERED ITEM OR SERVICE: HCPCS | Performed by: STUDENT IN AN ORGANIZED HEALTH CARE EDUCATION/TRAINING PROGRAM

## 2021-12-24 PROCEDURE — 700102 HCHG RX REV CODE 250 W/ 637 OVERRIDE(OP): Performed by: STUDENT IN AN ORGANIZED HEALTH CARE EDUCATION/TRAINING PROGRAM

## 2021-12-24 RX ORDER — OMEPRAZOLE 20 MG/1
20 CAPSULE, DELAYED RELEASE ORAL DAILY
Qty: 30 CAPSULE | Refills: 0 | Status: SHIPPED | OUTPATIENT
Start: 2021-12-25

## 2021-12-24 RX ADMIN — DEXAMETHASONE 4 MG: 4 TABLET ORAL at 08:02

## 2021-12-24 RX ADMIN — CALCIUM CARBONATE 500 MG: 500 TABLET, CHEWABLE ORAL at 05:47

## 2021-12-24 RX ADMIN — OMEPRAZOLE 20 MG: 20 CAPSULE, DELAYED RELEASE ORAL at 05:47

## 2021-12-24 NOTE — DISCHARGE PLANNING
Agency/Facility Name: [facility]  Spoke To: Smiley  Outcome: Unable to accept, does not take pt insurance.     DPA left voice message for Suzette @ [facility].    @1250  DPA spoke to Suzette @ [facility], they are still able to take pt without HH. Suzette reported she has spoken to pt  already.

## 2021-12-24 NOTE — PROGRESS NOTES
[facility] FAMILY MEDICINE PROGRESS NOTE     Attending: MD Maria Del Rosario     Resident: MD Jerry PhD                    DO Vani     PATIENT: Michelle Diana; [id number]; 1965    ID: 56 y.o. female hospital day 2 admitted for evaluation of syncope and GI bleed in the setting of metastatic breast cancer with mets to brain.    SUBJECTIVE: Episodes of sinus tachycardia overnight.  Patient doing well this a.m. denies any pain or discomfort.    OBJECTIVE:     Vitals:    12/23/21 1533 12/23/21 2000 12/24/21 0000 12/24/21 0400   BP: 121/85 106/75  110/73   Pulse: 96 (!) 113 (!) 102 (!) 101   Resp:  14 16 20   Temp:  36.1 °C (96.9 °F)  36.4 °C (97.5 °F)   TempSrc:  Temporal  Temporal   SpO2:  92% 94% 91%   Weight:  82.6 kg (182 lb 1.6 oz)     Height:           Intake/Output Summary (Last 24 hours) at 12/24/2021 0550  Last data filed at 12/24/2021 0400  Gross per 24 hour   Intake 540 ml   Output 0 ml   Net 540 ml       PE:   General: No acute distress, resting comfortably in bed. Cushingoid habitus.   HEENT: NC/AT. PERRLA. EOMI. MMM  Cardiovascular: Normal S1/S2, RRR, no m/r/g  Respiratory: Symmetric inspiratory effort. CTAB with no adventitious sounds  Abdomen: BS+, soft, NT/ND. Obese.   EXT:   2+ pulses, no rashes, bruising, or bleeding.  Neuro: Non focal with no numbness, tingling or changes in sensation    LABS:  Recent Labs     12/22/21  1316 12/22/21  1316 12/22/21  1640 12/22/21 2026 12/23/21  0409   WBC 2.9*  --   --   --  2.3*   RBC 2.88*  --   --   --  3.02*   HEMOGLOBIN 10.1*   < > 10.2* 10.8* 10.9*   HEMATOCRIT 30.4*   < > 30.3* 31.2* 30.8*   .6*  --   --   --  102.0*   MCH 35.1*  --   --   --  36.1*   RDW 57.2*  --   --   --  56.2*   PLATELETCT 56*  --   --   --  63*   MPV 10.9  --   --   --  11.0   NEUTSPOLYS 80.90*  --   --   --  56.50   LYMPHOCYTES 16.10*  --   --   --  36.50   MONOCYTES 1.00  --   --   --  0.00   EOSINOPHILS 0.00  --   --   --  0.00   BASOPHILS 1.00  --   --   --  0.00   RBCMORPHOLO  --    --   --   --  Present    < > = values in this interval not displayed.     Recent Labs     12/22/21  1316   SODIUM 135   POTASSIUM 3.9   CHLORIDE 104   CO2 20   BUN 21   CREATININE 0.43*   CALCIUM 6.9*   ALBUMIN 3.0*     Estimated GFR/CRCL = Estimated Creatinine Clearance: 151.3 mL/min (A) (by C-G formula based on SCr of 0.43 mg/dL (L)).  Recent Labs     12/22/21  1316   GLUCOSE 252*     Recent Labs     12/22/21  1316   ASTSGOT 43   ALTSGPT 172*   TBILIRUBIN 1.0   ALKPHOSPHAT 65   GLOBULIN 1.3*   INR 1.63*             Recent Labs     12/22/21  1316   INR 1.63*   APTT <20.0*         IMAGING:   DX-CHEST-PORTABLE (1 VIEW)   Final Result      Mild left basilar opacity probably representing atelectasis however correlate clinically for infection. Cannot exclude a trace left pleural effusion.          MEDS:  Current Facility-Administered Medications   Medication Last Admin   • tetrahydrozoline (VISINE) 0.05 % ophthalmic solution 1 Drop 1 Drop at 12/23/21 1620   • senna-docusate (PERICOLACE or SENOKOT S) 8.6-50 MG per tablet 2 Tablet      And   • polyethylene glycol/lytes (MIRALAX) PACKET 1 Packet      And   • magnesium hydroxide (MILK OF MAGNESIA) suspension 30 mL      And   • bisacodyl (DULCOLAX) suppository 10 mg     • acetaminophen (Tylenol) tablet 650 mg     • labetalol (NORMODYNE/TRANDATE) injection 10 mg     • guaiFENesin dextromethorphan (ROBITUSSIN DM) 100-10 MG/5ML syrup 10 mL     • calcium carbonate (TUMS) chewable tab 500 mg 500 mg at 12/24/21 0547   • dexamethasone (DECADRON) tablet 4 mg 4 mg at 12/23/21 1820   • omeprazole (PRILOSEC) capsule 20 mg 20 mg at 12/24/21 0547       PROBLEM LIST:  Patient Active Problem List    Diagnosis Date Noted   • Syncope and collapse [R55] 12/22/2021   • Deep vein thrombosis (HCC) [I82.409] 12/15/2021   • Brain metastases (HCC) [C79.31] 10/23/2020   • Vasogenic brain edema (HCC) [G93.6] 11/14/2016   • Breast cancer metastasized to brain, left (HCC) [C50.912, C79.31] 11/14/2016    • Unsteady gait [R26.81] 11/14/2016        ASSESSMENT/PLAN: Michelle Diana is a 56 y.o. female admitted for the evaluation of GI bleed and syncope in the setting of diagnosed breast cancer with metastasis to brain.    # hematochezia  Hemoglobin remained stable overnight. GI evaluated patient and recommended conservative management. Patient notes no further bloody stools today.   Continue to hold Eliquis, consider IVC filter for prophylaxis?  Hemoglobin has remained stable     # syncope  Telemetry with SR overnight.      # metastatic breast cancer  Patient wanting to stop her oral chemotherapy. She is wanting to pursue Hospice care.   Patient would like to discontinue chemotherapy at this time  Follow with hospice and palliative care     # leukopenia  # thrombocytopenia  Secondary to chemotherapy     # hypocalcemia  Continue home calcium supplementation     # LLE DVT  Continue to hold Eliquis    CODE STATUS: DNAR/DNI  Pamela Edwards MD PhD  PGY1  [employer]

## 2021-12-24 NOTE — PROGRESS NOTES
Report received and patient care assumed. Pt is resting in bed, A&Ox4, with no complaints of pain, and is on RA. Tele box on. All fall precautions are in place, belongings at bedside table.  Pt was updated on POC, no questions or concerns. Pt educated on use of call light for assistance.

## 2021-12-24 NOTE — DISCHARGE PLANNING
Updated DME order sent to [facility] @ 0745.    @0850  DPA spoke to Deborah @ [facility], hospital bed to be delivered to patient home at 1300. Per Deborah,  is also requesting a wheelchair. Wheelchair order faxed @ [phone].    Spoke to Smiley @ [facility], requested referral be resent directly to her. DPA sent to fax # [phone]. Smiley reported they would try to see patient this weekend but would likely be next week.     Spoke to Suzette @ [facility], notified that patient would like to go home today. Suzette requested a call from the  to coordinate.    LSW notified

## 2021-12-24 NOTE — PROGRESS NOTES
Notified by CNA, pt refusing VS. Discussed importance and reasoning with pt.  Education provided. Pt A&Ox4. Pt continues to refuse.

## 2021-12-24 NOTE — PROGRESS NOTES
Pt bladder scanned per protocol. Result 951 ml.     MD updated. Order to straight cath obtained.     Straight cath performed per order and policy. 520 ml output.

## 2021-12-24 NOTE — DISCHARGE INSTRUCTIONS
Discharge Instructions    Discharged to home by ambulance with escort. Discharged via ambulance, hospital escort: Yes.  Special equipment needed: Walker and Wheelchair    Be sure to schedule a follow-up appointment with your primary care doctor or any specialists as instructed.     Discharge Plan:   Diet Plan: Discussed  Activity Level: Discussed  Confirmed Follow up Appointment: Appointment Scheduled  Confirmed Symptoms Management: Discussed  Medication Reconciliation Updated: Yes    I understand that a diet low in cholesterol, fat, and sodium is recommended for good health. Unless I have been given specific instructions below for another diet, I accept this instruction as my diet prescription.       Special Instructions: None    · Is patient discharged on Warfarin / Coumadin?   No     Depression / Suicide Risk    As you are discharged from this [facility] facility, it is important to learn how to keep safe from harming yourself.    Recognize the warning signs:  · Abrupt changes in personality, positive or negative- including increase in energy   · Giving away possessions  · Change in eating patterns- significant weight changes-  positive or negative  · Change in sleeping patterns- unable to sleep or sleeping all the time   · Unwillingness or inability to communicate  · Depression  · Unusual sadness, discouragement and loneliness  · Talk of wanting to die  · Neglect of personal appearance   · Rebelliousness- reckless behavior  · Withdrawal from people/activities they love  · Confusion- inability to concentrate     If you or a loved one observes any of these behaviors or has concerns about self-harm, here's what you can do:  · Talk about it- your feelings and reasons for harming yourself  · Remove any means that you might use to hurt yourself (examples: pills, rope, extension cords, firearm)  · Get professional help from the community (Mental Health, Substance Abuse, psychological counseling)  · Do not be  alone:Call your Safe Contact- someone whom you trust who will be there for you.  · Call your local CRISIS HOTLINE 627-3988 or 458-787-5501  · Call your local Children's Mobile Crisis Response Team [place] (832) 638-1329 or www.Dibspace  · Call the toll free National Suicide Prevention Hotlines   · National Suicide Prevention Lifeline 060-332-XZDQ (9665)  · National Hope Line Network 800-SUICIDE (021-6782)        Hospice  Hospice is a service that is designed to provide people who are terminally ill and their families with medical, spiritual, and psychological support. Its aim is to improve your quality of life by keeping you as comfortable as possible in the final stages of life.  Who will be my providers when I begin hospice care?  Hospice teams often include:  · A nurse.  · A doctor. The hospice doctor will be available for your care, but you can include your regular doctor or nurse practitioner.  · A .  · A counselor.  · A  (such as a ).  · A dietitian.  · Therapists.  · Trained volunteers who can help with care.  What services does hospice provide?  Hospice services can vary depending on the center or organization. Generally, they include:  · Ways to keep you comfortable, such as:  ? Providing care in your home or in a home-like setting.  ? Working with your family and friends to help meet your needs.  ? Allowing you to enjoy the support of loved ones by receiving much of your basic care from family and friends.  · Pain relief and symptom management. The staff will supply all necessary medicines and equipment so that you can stay comfortable and alert enough to enjoy the company of your friends and family.  · Visits or care from a nurse and doctor. This may include 24-hour on-call services.  · Companionship when you are alone.  · Allowing you and your family to rest. Hospice staff may do light housekeeping, prepare meals, and run errands.  · Counseling. They  will make sure your emotional, spiritual, and social needs are being met, as well as those needs of your family members.  · Spiritual care. This will be individualized to meet your needs and your family's needs. It may involve:  ? Helping you and your family understand the dying process.  ? Helping you say goodbye to your family and friends.  ? Performing a specific Yazidism ceremony or ritual.  · Massage.  · Nutrition therapy.  · Physical and occupational therapy.  · Short-term inpatient care, if something cannot be managed in the home.  · Art or music therapy.  · Bereavement support for grieving family members.  When should hospice care begin?  Most people who use hospice are believed to have less than 6 months to live.  · Your family and health care providers can help you decide when hospice services should begin.  · If you live longer than 6 months but your condition does not improve, your doctor may be able to approve you for continued hospice care.  · If your condition improves, you may discontinue the program.  What should I consider before selecting a program?  Most hospice programs are run by nonprofit, independent organizations. Some are affiliated with hospitals, nursing homes, or home health care agencies. Hospice programs can take place in your home or at a hospice center, hospital, or skilled nursing facility. When choosing a hospice program, ask the following questions:  · What services are available to me?  · What services will be offered to my loved ones?  · How involved will my loved ones be?  · How involved will my health care provider be?  · Who makes up the hospice care team? How are they trained or screened?  · How will my pain and symptoms be managed?  · If my circumstances change, can the services be provided in a different setting, such as my home or in the hospital?  · Is the program reviewed and licensed by the state or certified in some other way?  · What does it cost? Is it covered by  insurance?  · If I choose a hospice center or nursing home, where is the hospice center located? Is it convenient for family and friends?  · If I choose a hospice center or nursing home, can my family and friends visit any time?  · Will you provide emotional and spiritual support?  · Who can my family call with questions?  Where can I learn more about hospice?  You can learn about existing hospice programs in your area from your health care providers. You can also read more about hospice online. The websites of the following organizations have helpful information:  · National Hospice and Palliative Care Organization (NHPCO): www.nhpco.org  · National Association for Home Care & Hospice (NAHC): www.nahc.org  · Hospice Foundation of [place] (HFA): www.hospicefoundation.org  · American Cancer Society (ACS): www.cancer.org  · Hospice Net: www.hospicenet.org  · Visiting Nurse Associations of [place] (VNAA): www.vnaa.org  You may also find more information by contacting the following agencies:  · A local agency on aging.  · Your local [facility] chapter.  · Your state's department of health or .  Summary  · Hospice is a service that is designed to provide people who are terminally ill and their families with medical, spiritual, and psychological support.  · Hospice aims to improve your quality of life by keeping you as comfortable as possible in the final stages of life.  · Hospice teams often include a doctor, nurse, , counselor, ,dietitian, therapists, and volunteers.  · Hospice care generally includes medicine for symptom management, visits from doctors and nurses, physical and occupational therapy, nutrition counseling, spiritual and emotional counseling, caregiver support, and bereavement support for grieving family members.  · Hospice programs can take place in your home or at a hospice center, hospital, or skilled nursing facility.  This information is not intended to  replace advice given to you by your health care provider. Make sure you discuss any questions you have with your health care provider.  Document Released: 04/05/2005 Document Revised: 11/30/2018 Document Reviewed: 01/09/2018  Elsevier Patient Education © 2020 Elsevier Inc.

## 2021-12-24 NOTE — PROGRESS NOTES
Discharge instructions given to patient. Prescriptions given to patient. Discharge instructions given to patient at bedside, verbalizes understanding. New and home medication review, post-discharge activity level and worsening of symptoms needing follow-up care discussed. Telemetry monitor/IV cathlon removed. All belongings accounted for, all questions answered at this time. Awaiting [facility] transport.

## 2021-12-24 NOTE — DISCHARGE SUMMARY
[facility] MEDICINE PROGRESS NOTE     Attending:   Ladan Mix MD    Resident:   DO Pamela Macias MD, PhD    PATIENT: Michelle Diana; [id number]; 1965    ID: 56 y.o. female admitted for syncope and lower GI bleed    24 Hour Events: required straight catheterization this morning due to retaining urine, patient notes that she does not have the urge to void, she still feels well enough to go home    OBJECTIVE:     Vitals:    12/23/21 2000 12/24/21 0000 12/24/21 0400 12/24/21 0751   BP: 106/75  110/73 103/72   Pulse: (!) 113 (!) 102 (!) 101 (!) 102   Resp: 14 16 20 18   Temp: 36.1 °C (96.9 °F)  36.4 °C (97.5 °F) 35.8 °C (96.5 °F)   TempSrc: Temporal  Temporal Temporal   SpO2: 92% 94% 91% 93%   Weight: 82.6 kg (182 lb 1.6 oz)      Height:           Intake/Output Summary (Last 24 hours) at 12/24/2021 0927  Last data filed at 12/24/2021 0644  Gross per 24 hour   Intake 540 ml   Output 520 ml   Net 20 ml       PE:  General: No acute distress, resting comfortably in bed.  HEENT: Normocephalic, atraumatic. EOMI. MMM  Cardiovascular: RRR with no M/R/G.  Respiratory: Symmetrical chest. Clear to auscultation bilaterally with no wheezes, rales or rhonchi  Abdomen: soft, non-tender, non-distended no masses, +BS   EXT:  Moves all extremities, grossly normal. No edema or cyanosis. 2+ pulses in all extremities  Neuro: non focal, sensation grossly intact    LABS:  Recent Labs     12/22/21  1316 12/22/21  1316 12/22/21  1640 12/22/21 2026 12/23/21  0409   WBC 2.9*  --   --   --  2.3*   RBC 2.88*  --   --   --  3.02*   HEMOGLOBIN 10.1*   < > 10.2* 10.8* 10.9*   HEMATOCRIT 30.4*   < > 30.3* 31.2* 30.8*   .6*  --   --   --  102.0*   MCH 35.1*  --   --   --  36.1*   RDW 57.2*  --   --   --  56.2*   PLATELETCT 56*  --   --   --  63*   MPV 10.9  --   --   --  11.0   NEUTSPOLYS 80.90*  --   --   --  56.50   LYMPHOCYTES 16.10*  --   --   --  36.50   MONOCYTES 1.00  --   --   --  0.00   EOSINOPHILS 0.00  --   --    --  0.00   BASOPHILS 1.00  --   --   --  0.00   RBCMORPHOLO  --   --   --   --  Present    < > = values in this interval not displayed.     Recent Labs     12/22/21  1316   SODIUM 135   POTASSIUM 3.9   CHLORIDE 104   CO2 20   BUN 21   CREATININE 0.43*   CALCIUM 6.9*   ALBUMIN 3.0*     Estimated GFR/CRCL = Estimated Creatinine Clearance: 151.3 mL/min (A) (by C-G formula based on SCr of 0.43 mg/dL (L)).  Recent Labs     12/22/21  1316   GLUCOSE 252*     Recent Labs     12/22/21  1316   ASTSGOT 43   ALTSGPT 172*   TBILIRUBIN 1.0   ALKPHOSPHAT 65   GLOBULIN 1.3*   INR 1.63*             Recent Labs     12/22/21  1316   INR 1.63*   APTT <20.0*       MICROBIOLOGY: none    IMAGING:   DX-CHEST-PORTABLE (1 VIEW)   Final Result      Mild left basilar opacity probably representing atelectasis however correlate clinically for infection. Cannot exclude a trace left pleural effusion.              DISCHARGE SUMMARY:   Brief HPI:  Michelle  is a 56 y.o.  Female  who was admitted on 12/22/2021 for syncope in the setting of lower GI bleed. Patient with history of metastatic breast cancer. She is followed by [facility] and is taking oral chemotherapy. She is also taking Eliquis for LLE DVT. On date prior to admission patient had one episode of syncope and hit her head. The next day patient fell again but was helped down to the ground. Patient had dark/ bloody stools that day as well and presented to [place] ED. There she was noted to have bloody BM and Hgb dropped from 13 to 10. CT head was negative for acute bleed. She was transferred to [facility] ED for higher level of care. In the [facility] ED, Hgb was stable at 10 and patient had another bloody stool. GI was consulted and recommended against colonoscopy for the time being. Patient was admitted to telemetry for monitoring.       Admit Date:  12/22/2021         Discharge Date:  12/24/2021    Admission Diagnosis:   Hematochezia  Anemia  Syncope  Elevated INR    Discharge  Diagnosis:  Desiring hospice care  Anemia    Chronic Diagnoses:               Metastatic breast cancer  Hypocalcemia  Leukopenia  Thrombocytopenia    Hospital Course: Patient was admitted to telemetry due to syncopal episodes and her hemoglobin was trended. This remained stable and over course of stay patient did not have any further bloody bowel movements. Patient's Eliquis was held in the setting of GI bleed. Patient discussed that her oral chemotherapy made her feel poor and she desired to discontinue it and pursue hospice care. Her chemotherapy was held during her hospital stay. Hospice consult was placed inpatient but they were unable to see her during her stay. Outpatient hospice referral was placed. The patient's wishes were relayed to her outpatient Oncology team. Recommended to the patient to contact her Oncology team on Monday to move forward with hospice care. Order was placed for hospital bed and for wheelchair. Patient required straight catheterization for urine retention and it was discussed that home health could complete this for her at home. Order for home health was placed. Patient was discharged home in stable condition.     Consultants:      GI    Procedures:        none    Disposition:   Discharge home on hospice    Diet:   regular    Activity:   As tolerated    Discharge Medications:           Medication List      START taking these medications      Instructions   omeprazole 20 MG delayed-release capsule  Start taking on: December 25, 2021  Commonly known as: PRILOSEC   Take 1 Capsule by mouth every day.  Dose: 20 mg        CONTINUE taking these medications      Instructions   CALCIUM PO   Take 600 mg by mouth 2 times a day.  Dose: 600 mg     dexamethasone 4 MG Tabs  Commonly known as: DECADRON   Take 4 mg by mouth 2 times a day.  Dose: 4 mg        STOP taking these medications    apixaban 5mg Tabs  Commonly known as: Eliquis     capecitabine 150 MG tablet  Commonly known as: XELODA      capecitabine 500 MG tablet  Commonly known as: XELODA     Xgeva 120 MG/1.7ML injection  Generic drug: denosumab     ZANTAC PO            Instructions:      Contact Oncology team on Monday to further coordinate hospice care moving forward  Continue taking daily steroid  Stop taking Eliquis  Take daily omeprazole, this has been sent to your pharmacy     The patient was instructed to return to the ER in the event of worsening symptoms. I have counseled the patient on the importance of compliance and the patient has agreed to proceed with all medical recommendations and follow up plan indicated above.   The patient understands that all medications come with benefits and risks. Risks may include permanent injury or death and these risks can be minimized with close reassessment and monitoring.        Please CC: Pcp Pt States None    Follow up appointment details:      Contact Oncology team on Monday    Pending Studies:        none

## 2021-12-24 NOTE — FACE TO FACE
Face to Face Note  -  Durable Medical Equipment    Gamaliel Ludwig D.O. - NPI: [id number]  I certify that this patient is under my care and that they had a durable medical equipment(DME)face to face encounter by myself that meets the physician DME face-to-face encounter requirements with this patient on:    Date of encounter:   Patient:                    MRN:                       YOB: 2023  Michelle Diana  [id number]  1965     The encounter with the patient was in whole, or in part, for the following medical condition, which is the primary reason for durable medical equipment:  Other - metastatic cancer    I certify that, based on my findings, the following durable medical equipment is medically necessary:  Wheel Chair.    HOME O2 Saturation Measurements:(Values must be present for Home Oxygen orders)         ,     ,         My Clinical findings support the need for the above equipment due to:  Frequent Falls, Other - weakness    Supporting Symptoms: multiple falls recently due to weakness    If patient feels more short of breath, they can go up to 6 liters per minute and contact healthcare provider.

## 2021-12-24 NOTE — DISCHARGE PLANNING
Received Transport Form @ [id number]  Spoke to Suze @ [facility]    Transport is scheduled for 12/24 @1400 going home.    RNCM/LSW notified

## 2021-12-24 NOTE — PROGRESS NOTES
Patient has not voided since 0600 straight cath. MD notified. Pt adamant about discharging today and hospice company is unable to straight cath patient. Order received to place good cath. Written and verbal education provided to patient and family on good care.

## 2021-12-24 NOTE — CARE PLAN
The patient is Watcher - Medium risk of patient condition declining or worsening    Shift Goals  Clinical Goals: Monitor VS and diagnostic test, maintain skin integrity, increase mobility, maintain safety and prevent falls  Patient Goals: Comfort, rest  Family Goals: ELOINA. No family present.     Progress made toward(s) clinical / shift goals:      Problem: Skin Integrity  Goal: Skin integrity is maintained or improved  Outcome: Progressing     Problem: Discharge Barriers/Planning  Goal: Patient's continuum of care needs are met  Outcome: Progressing     Problem: Nutrition  Goal: Patient's nutritional and fluid intake will be adequate or improve  Outcome: Progressing       Patient is not progressing towards the following goals:      Problem: Mobility  Goal: Patient's capacity to carry out activities will improve  Outcome: Not Progressing

## 2021-12-24 NOTE — DISCHARGE PLANNING
Anticipated Discharge Disposition: Discharge home with hospice services    Action: LSW met with patients family at bedside, patient was sleeping. LSW updated family on the delivery medical bed at 13:00 to their home, patients , Shawn states he is aware as they called him and it might be around 1:30 when they deliver. The family is planning on being at home by 12:00 to make sure they are there for delivery. LSW asked if they would like [facility] ride set up and they all agreed it would be the safest for patient. LSW asked if they still needs wheelchair and family would still like a wheelchair for home. LSW will get wheelchair and ride set up for patient. LSW asked if Shawn could call Suzette at [facility] to set up schedule for hospice and he agreed to call    KIRBY Arizmendi sent request to AR[facility] for [facility] transport for 14:00      Barriers to Discharge: None    Plan: Patient to be transported home by [facility] at 14:00 today to [address], [place] [address]

## 2022-01-18 ENCOUNTER — HOSPITAL ENCOUNTER (OUTPATIENT)
Dept: RADIATION ONCOLOGY | Facility: MEDICAL CENTER | Age: 57
End: 2022-01-31
Attending: RADIOLOGY
Payer: COMMERCIAL

## 2022-03-16 ENCOUNTER — TELEPHONE (OUTPATIENT)
Dept: VASCULAR LAB | Facility: MEDICAL CENTER | Age: 57
End: 2022-03-16
Payer: COMMERCIAL

## 2022-03-16 NOTE — TELEPHONE ENCOUNTER
Called pt regarding missed anticoag appt - no answer. LVM asking pt to c/b to reschedule.     Will f/u at a later time.    Kareem Fernandez, PharmD, BCACP

## 2022-03-21 ENCOUNTER — TELEPHONE (OUTPATIENT)
Dept: VASCULAR LAB | Facility: MEDICAL CENTER | Age: 57
End: 2022-03-21
Payer: COMMERCIAL

## 2022-03-21 NOTE — TELEPHONE ENCOUNTER
Called pt regarding missed anticoag appt - no answer. LVM asking pt to c/b to reschedule.     Sent letter.     Will f/u at a later time.     Kareem Fernandez, LitzyD, BCACP

## 2022-03-21 NOTE — LETTER
Michelle Diana  [address] [place] [id number]    03/21/22    Dear Michelle Diana ,    We have been unsuccessful in our attempts to contact you regarding your Anticoagulation Service appointments. Eliquis is a potent blood-thinning agent that requires monitoring to ensure that the dosage is correct for your body.  If it isn't, you could develop serious, sometimes life-threatening bleeding problems or life-threatening blood clots or stroke could result.    To monitor you effectively, we need to be able to communicate with you.  This is a requirement to be followed by our Service.       If you repeatedly fail to keep your lab appointments, you are at risk of being discharged from the Anticoagulation Service.    It is extremely important that you contact the clinic as soon as possible to arrange appropriate follow up.  We are open Monday-Friday 8 am until 5 pm.  You may reach our Service at [phone].           Sincerely,           Manfred Corbin, PharmD, [facility]  Clinic Supervisor  [facility]  Outpatient Anticoagulation Service

## 2022-03-28 ENCOUNTER — DOCUMENTATION (OUTPATIENT)
Dept: VASCULAR LAB | Facility: MEDICAL CENTER | Age: 57
End: 2022-03-28
Payer: COMMERCIAL

## 2022-03-28 NOTE — PROGRESS NOTES
[facility]    Called patient's spouse who reports patient passed away on 12/29/21. Offered condolences and updated chart.     Geraldo Dawkins, LitzyD

## 2023-04-18 NOTE — OP REPORT
DATE OF SERVICE:  04/12/2021     PREOPERATIVE DIAGNOSIS:  Multiple metastatic lesions to the brain from breast   cancer.     POSTOPERATIVE DIAGNOSIS:  Multiple metastatic lesions to the brain from breast   cancer.     PROCEDURE:  Novalis stereotactic radiosurgery to four brain lesions.     SURGEON:  German Snowden MD     RADIATION ONCOLOGIST:  Merlene Darnell MD     RADIATION PHYSICIST:  Sahn Caceres.     DESCRIPTION OF PROCEDURE:  The patient was given stereotactic radiosurgery for   27 Gy  into 3 fractions, each of which was 900 cGy to 4 individual   lesions, all tolerances were within acceptable ranges.  I was present for   approval of the plan and for the first fraction.  There were no complications.    The patient tolerated the procedure well.        ______________________________  GERMAN SNOWDEN MD    CPD/OSMEL/TOO    DD:  04/12/2021 11:37  DT:  04/12/2021 12:27    Job#:  [id number]   Detail Level: Generalized

## (undated) DEVICE — GLOVE BIOGEL SZ 8.5 SURGICAL PF LTX - (50PR/BX 4BX/CA)

## (undated) DEVICE — SUCTION INSTRUMENT YANKAUER BULBOUS TIP W/O VENT (50EA/CA)

## (undated) DEVICE — BATTERY VARISPEED

## (undated) DEVICE — PEN SKIN MARKER W/RULER - (50EA/BX)

## (undated) DEVICE — TOWELS CLOTH SURGICAL - (4/PK 20PK/CA)

## (undated) DEVICE — SODIUM CHL IRRIGATION 0.9% 1000ML (12EA/CA)

## (undated) DEVICE — SUTURE 3-0 VICRYL PLUS SH - 27 INCH (36/BX)

## (undated) DEVICE — KIT ANESTHESIA W/CIRCUIT & 3/LT BAG W/FILTER (20EA/CA)

## (undated) DEVICE — GLOVE BIOGEL PI INDICATOR SZ 7.0 SURGICAL PF LF - (50/BX 4BX/CA)

## (undated) DEVICE — GLOVE SZ 6.5 BIOGEL PI MICRO - PF LF (50PR/BX)

## (undated) DEVICE — TRAY SURESTEP FOLEY TEMP SENSING 16FR (10EA/CA) ORDER  #18764 FOR TEMP FOLEY ONLY

## (undated) DEVICE — SLEEVE, VASO, THIGH, MED

## (undated) DEVICE — SUTURE GENERAL

## (undated) DEVICE — KIT ROOM DECONTAMINATION

## (undated) DEVICE — SPHERE NAVIGATION STEALTH (5EA/TY 12TY/PK)

## (undated) DEVICE — GOWN WARMING STANDARD FLEX - (30/CA)

## (undated) DEVICE — DRESSING NON-ADHERING 8 X 3 - (50/BX)

## (undated) DEVICE — BLADE CLIPPER FITS 2501 CLIPPER (BLUE)  (20EA/CA)

## (undated) DEVICE — NEPTUNE 4 PORT MANIFOLD - (20/PK)

## (undated) DEVICE — SENSOR SPO2 NEO LNCS ADHESIVE (20/BX) SEE USER NOTES

## (undated) DEVICE — CANISTER SUCTION 3000ML MECHANICAL FILTER AUTO SHUTOFF MEDI-VAC NONSTERILE LF DISP  (40EA/CA)

## (undated) DEVICE — CHLORAPREP 26 ML APPLICATOR - ORANGE TINT(25/CA)

## (undated) DEVICE — DRESSING TRANSPARENT FILM TEGADERM 2.375 X 2.75"  (100EA/BX)"

## (undated) DEVICE — SCALP CLIP RANEY 20-1037 (10EA/PK 20PK/CA)

## (undated) DEVICE — TUBE E-T HI-LO CUFF 7.0MM (10EA/PK)

## (undated) DEVICE — PERFORATER DISP TIP DGR-0

## (undated) DEVICE — PACK MINOR BASIN - (2EA/CA)

## (undated) DEVICE — SET LEADWIRE 5 LEAD BEDSIDE DISPOSABLE ECG (1SET OF 5/EA)

## (undated) DEVICE — GLOVE BIOGEL PI INDICATOR SZ 6.5 SURGICAL PF LF - (50/BX 4BX/CA)

## (undated) DEVICE — GLOVE BIOGEL PI INDICATOR SZ 6.0 SURGICAL PF LF -(200PR/CA)

## (undated) DEVICE — LACTATED RINGERS INJ 1000 ML - (14EA/CA 60CA/PF)

## (undated) DEVICE — PROTECTOR ULNA NERVE - (36PR/CA)

## (undated) DEVICE — GLOVE BIOGEL INDICATOR SZ 7SURGICAL PF LTX - (50/BX 4BX/CA)

## (undated) DEVICE — SET EXTENSION WITH 2 PORTS (48EA/CA) ***PART #2C8610 IS A SUBSTITUTE*****

## (undated) DEVICE — SUTURE 4-0 VICRYL PLUS FS-2 - 27 INCH (36/BX)

## (undated) DEVICE — BLADE SURGICAL #15 - (50/BX 3BX/CA)

## (undated) DEVICE — GOWN SURGEONS X-LARGE - DISP. (30/CA)

## (undated) DEVICE — COVER PROBE STERILE CONE (12EA/CA)

## (undated) DEVICE — MIDAS LUBRICATOR DIFFUSER PACK (4EA/CA)

## (undated) DEVICE — ELECTRODE DUAL RETURN W/ CORD - (50/PK)

## (undated) DEVICE — SPANDAGE SZ 6 ELASTIC NET - (25YD/CA)

## (undated) DEVICE — DISSECT TOOL MIDAS F2/8TA23

## (undated) DEVICE — GLOVE BIOGEL SZ 6.5 SURGICAL PF LTX (50PR/BX 4BX/CA)

## (undated) DEVICE — MASK ANESTHESIA ADULT  - (100/CA)

## (undated) DEVICE — GOWN SURGICAL XX-LARGE - (28EA/CA) SIRUS NON REINFORCED

## (undated) DEVICE — BLANKET WARMING LOWER BODY - (10/CA) INACTIVE USE #8585

## (undated) DEVICE — CORETEMP DRAPE FORM-FITTED EASY DROPANDGO DRAPE FOR USE ON THE CORETEMP FLUID MANAGEMENT 56IN X 56IN

## (undated) DEVICE — GLOVE BIOGEL PI ORTHO SZ 6 SURGICAL PF LF (40PR/BX)

## (undated) DEVICE — DRAPE LAPAROTOMY T SHEET - (12EA/CA)

## (undated) DEVICE — SUTURE 4-0 NUROLON CR/8 TF - (12/BX) ETHICON

## (undated) DEVICE — GLOVE BIOGEL PI ORTHO SZ 6 1/2 SURGICAL PF LF (40PR/BX)

## (undated) DEVICE — DRESSING TRANSPARENT FILM TEGADERM 4 X 4.75" (50EA/BX)"

## (undated) DEVICE — HEMOSTAT ARISTA PWD 3 GRAM - (5/CA)

## (undated) DEVICE — PATTIES SURG X-RAYCOTTONOID - 1/2 X 3 IN (200/CA)

## (undated) DEVICE — GLOVE BIOGEL INDICATOR SZ 6.5 SURGICAL PF LTX - (50PR/BX 4BX/CA)

## (undated) DEVICE — SUTURE 0 VICRYL PLUS CT-2 - 8 X 18 INCH (12/BX)

## (undated) DEVICE — TUBING CLEARLINK DUO-VENT - C-FLO (48EA/CA)

## (undated) DEVICE — BANDAGE ROLL STERILE BULKEE 4.5 IN X 4 YD (100EA/CA)

## (undated) DEVICE — TRANSDUCER ADULT DISP. SINGLE BONDED STERILE - (20EA/CA)

## (undated) DEVICE — PATTIES SURG X-RAYCOTTONOID - 1 X 3 IN (200/CA)

## (undated) DEVICE — ELECTRODE 850 FOAM ADHESIVE - HYDROGEL RADIOTRNSPRNT (50/PK)

## (undated) DEVICE — KIT SURGIFLO W/OUT THROMBIN - (6EA/CA)

## (undated) DEVICE — CORDS BIPOLAR COAGULATION - 12FT STERILE DISP. (10EA/BX)

## (undated) DEVICE — HEMOSTAT SURG ABSORBABLE - 4 X 8 IN SURGICEL (24EA/CA)

## (undated) DEVICE — PACK CRANI - (1EA/CA)

## (undated) DEVICE — BOVIE BLADE COATED &INSULATED - 25/PK

## (undated) DEVICE — HEAD HOLDER JUNIOR/ADULT

## (undated) DEVICE — KIT RADIAL ARTERY 20GA W/MAX BARRIER AND BIOPATCH  (5EA/CA) #10740 IS FOR THE SET RADIAL ARTERIAL

## (undated) DEVICE — DRESSING XEROFORM 1X8 - (50/BX 4BX/CA)

## (undated) DEVICE — SURGIFOAM (SIZE 100) - (6EA/CA)

## (undated) DEVICE — PADDING CAST 4 IN STERILE - 4 X 4 YDS (24/CA)